# Patient Record
Sex: MALE | Race: WHITE | NOT HISPANIC OR LATINO | Employment: OTHER | URBAN - METROPOLITAN AREA
[De-identification: names, ages, dates, MRNs, and addresses within clinical notes are randomized per-mention and may not be internally consistent; named-entity substitution may affect disease eponyms.]

---

## 2017-02-01 ENCOUNTER — ANESTHESIA (OUTPATIENT)
Dept: SURGERY | Facility: CLINIC | Age: 62
DRG: 470 | End: 2017-02-01
Payer: COMMERCIAL

## 2017-02-01 ENCOUNTER — APPOINTMENT (OUTPATIENT)
Dept: GENERAL RADIOLOGY | Facility: CLINIC | Age: 62
DRG: 470 | End: 2017-02-01
Attending: ORTHOPAEDIC SURGERY
Payer: COMMERCIAL

## 2017-02-01 ENCOUNTER — ANESTHESIA EVENT (OUTPATIENT)
Dept: SURGERY | Facility: CLINIC | Age: 62
DRG: 470 | End: 2017-02-01
Payer: COMMERCIAL

## 2017-02-01 ENCOUNTER — HOSPITAL ENCOUNTER (INPATIENT)
Facility: CLINIC | Age: 62
LOS: 2 days | Discharge: HOME | DRG: 470 | End: 2017-02-03
Attending: ORTHOPAEDIC SURGERY | Admitting: ORTHOPAEDIC SURGERY
Payer: COMMERCIAL

## 2017-02-01 DIAGNOSIS — Z96.649 S/P REVISION OF TOTAL HIP: Primary | ICD-10-CM

## 2017-02-01 PROBLEM — G47.33 OSA (OBSTRUCTIVE SLEEP APNEA): Status: ACTIVE | Noted: 2017-02-01

## 2017-02-01 PROBLEM — I10 BENIGN ESSENTIAL HYPERTENSION: Status: ACTIVE | Noted: 2017-02-01

## 2017-02-01 PROBLEM — E66.01 MORBID OBESITY DUE TO EXCESS CALORIES (H): Status: ACTIVE | Noted: 2017-02-01

## 2017-02-01 LAB
ABO + RH BLD: NORMAL
ABO + RH BLD: NORMAL
BLD GP AB SCN SERPL QL: NORMAL
BLOOD BANK CMNT PATIENT-IMP: NORMAL
CREAT SERPL-MCNC: 1.02 MG/DL (ref 0.66–1.25)
GFR SERPL CREATININE-BSD FRML MDRD: 74 ML/MIN/1.7M2
PLATELET # BLD AUTO: 198 10E9/L (ref 150–450)
POTASSIUM SERPL-SCNC: 4.3 MMOL/L (ref 3.4–5.3)
SPECIMEN EXP DATE BLD: NORMAL

## 2017-02-01 PROCEDURE — 84132 ASSAY OF SERUM POTASSIUM: CPT | Performed by: ANESTHESIOLOGY

## 2017-02-01 PROCEDURE — 25000132 ZZH RX MED GY IP 250 OP 250 PS 637: Performed by: ORTHOPAEDIC SURGERY

## 2017-02-01 PROCEDURE — 0SR901A REPLACEMENT OF RIGHT HIP JOINT WITH METAL SYNTHETIC SUBSTITUTE, UNCEMENTED, OPEN APPROACH: ICD-10-PCS | Performed by: ORTHOPAEDIC SURGERY

## 2017-02-01 PROCEDURE — 25000125 ZZHC RX 250: Performed by: NURSE ANESTHETIST, CERTIFIED REGISTERED

## 2017-02-01 PROCEDURE — 25000128 H RX IP 250 OP 636: Performed by: ANESTHESIOLOGY

## 2017-02-01 PROCEDURE — 12000007 ZZH R&B INTERMEDIATE

## 2017-02-01 PROCEDURE — 25800025 ZZH RX 258: Performed by: ORTHOPAEDIC SURGERY

## 2017-02-01 PROCEDURE — 82565 ASSAY OF CREATININE: CPT | Performed by: ORTHOPAEDIC SURGERY

## 2017-02-01 PROCEDURE — 86900 BLOOD TYPING SEROLOGIC ABO: CPT | Performed by: ANESTHESIOLOGY

## 2017-02-01 PROCEDURE — 40000169 ZZH STATISTIC PRE-PROCEDURE ASSESSMENT I: Performed by: ORTHOPAEDIC SURGERY

## 2017-02-01 PROCEDURE — 25000566 ZZH SEVOFLURANE, EA 15 MIN: Performed by: ORTHOPAEDIC SURGERY

## 2017-02-01 PROCEDURE — 36415 COLL VENOUS BLD VENIPUNCTURE: CPT | Performed by: ORTHOPAEDIC SURGERY

## 2017-02-01 PROCEDURE — 71000012 ZZH RECOVERY PHASE 1 LEVEL 1 FIRST HR: Performed by: ORTHOPAEDIC SURGERY

## 2017-02-01 PROCEDURE — 71000013 ZZH RECOVERY PHASE 1 LEVEL 1 EA ADDTL HR: Performed by: ORTHOPAEDIC SURGERY

## 2017-02-01 PROCEDURE — 25000125 ZZHC RX 250: Performed by: ANESTHESIOLOGY

## 2017-02-01 PROCEDURE — 25000128 H RX IP 250 OP 636: Performed by: PHYSICIAN ASSISTANT

## 2017-02-01 PROCEDURE — 25000125 ZZHC RX 250: Performed by: ORTHOPAEDIC SURGERY

## 2017-02-01 PROCEDURE — 25000128 H RX IP 250 OP 636

## 2017-02-01 PROCEDURE — 99222 1ST HOSP IP/OBS MODERATE 55: CPT | Performed by: PHYSICIAN ASSISTANT

## 2017-02-01 PROCEDURE — 99207 ZZC MOONLIGHTING INDICATOR: CPT | Performed by: PHYSICIAN ASSISTANT

## 2017-02-01 PROCEDURE — 37000008 ZZH ANESTHESIA TECHNICAL FEE, 1ST 30 MIN: Performed by: ORTHOPAEDIC SURGERY

## 2017-02-01 PROCEDURE — 25000132 ZZH RX MED GY IP 250 OP 250 PS 637: Performed by: PHYSICIAN ASSISTANT

## 2017-02-01 PROCEDURE — 25800025 ZZH RX 258: Performed by: ANESTHESIOLOGY

## 2017-02-01 PROCEDURE — C1776 JOINT DEVICE (IMPLANTABLE): HCPCS | Performed by: ORTHOPAEDIC SURGERY

## 2017-02-01 PROCEDURE — 25000128 H RX IP 250 OP 636: Performed by: NURSE ANESTHETIST, CERTIFIED REGISTERED

## 2017-02-01 PROCEDURE — 25800025 ZZH RX 258: Performed by: NURSE ANESTHETIST, CERTIFIED REGISTERED

## 2017-02-01 PROCEDURE — 36000067 ZZH SURGERY LEVEL 5 1ST 30 MIN: Performed by: ORTHOPAEDIC SURGERY

## 2017-02-01 PROCEDURE — 40000940 XR PELVIS AND HIP RIGHT 1 VIEW

## 2017-02-01 PROCEDURE — 27210995 ZZH RX 272: Performed by: ORTHOPAEDIC SURGERY

## 2017-02-01 PROCEDURE — 36415 COLL VENOUS BLD VENIPUNCTURE: CPT | Performed by: ANESTHESIOLOGY

## 2017-02-01 PROCEDURE — 27210794 ZZH OR GENERAL SUPPLY STERILE: Performed by: ORTHOPAEDIC SURGERY

## 2017-02-01 PROCEDURE — 86850 RBC ANTIBODY SCREEN: CPT | Performed by: ANESTHESIOLOGY

## 2017-02-01 PROCEDURE — 37000009 ZZH ANESTHESIA TECHNICAL FEE, EACH ADDTL 15 MIN: Performed by: ORTHOPAEDIC SURGERY

## 2017-02-01 PROCEDURE — P9041 ALBUMIN (HUMAN),5%, 50ML: HCPCS | Performed by: NURSE ANESTHETIST, CERTIFIED REGISTERED

## 2017-02-01 PROCEDURE — 86901 BLOOD TYPING SEROLOGIC RH(D): CPT | Performed by: ANESTHESIOLOGY

## 2017-02-01 PROCEDURE — 85049 AUTOMATED PLATELET COUNT: CPT | Performed by: ORTHOPAEDIC SURGERY

## 2017-02-01 PROCEDURE — 25000128 H RX IP 250 OP 636: Performed by: ORTHOPAEDIC SURGERY

## 2017-02-01 PROCEDURE — 36000069 ZZH SURGERY LEVEL 5 EA 15 ADDTL MIN: Performed by: ORTHOPAEDIC SURGERY

## 2017-02-01 DEVICE — IMPLANTABLE DEVICE: Type: IMPLANTABLE DEVICE | Site: HIP | Status: FUNCTIONAL

## 2017-02-01 RX ORDER — CEFAZOLIN SODIUM 2 G/100ML
2 INJECTION, SOLUTION INTRAVENOUS EVERY 8 HOURS
Status: COMPLETED | OUTPATIENT
Start: 2017-02-01 | End: 2017-02-02

## 2017-02-01 RX ORDER — GLYCOPYRROLATE 0.2 MG/ML
INJECTION, SOLUTION INTRAMUSCULAR; INTRAVENOUS PRN
Status: DISCONTINUED | OUTPATIENT
Start: 2017-02-01 | End: 2017-02-01

## 2017-02-01 RX ORDER — METOCLOPRAMIDE 10 MG/1
10 TABLET ORAL EVERY 6 HOURS PRN
Status: DISCONTINUED | OUTPATIENT
Start: 2017-02-01 | End: 2017-02-03 | Stop reason: HOSPADM

## 2017-02-01 RX ORDER — MAGNESIUM HYDROXIDE 1200 MG/15ML
LIQUID ORAL PRN
Status: DISCONTINUED | OUTPATIENT
Start: 2017-02-01 | End: 2017-02-01 | Stop reason: HOSPADM

## 2017-02-01 RX ORDER — CEFAZOLIN SODIUM 1 G/3ML
1 INJECTION, POWDER, FOR SOLUTION INTRAMUSCULAR; INTRAVENOUS SEE ADMIN INSTRUCTIONS
Status: DISCONTINUED | OUTPATIENT
Start: 2017-02-01 | End: 2017-02-01 | Stop reason: HOSPADM

## 2017-02-01 RX ORDER — FENTANYL CITRATE 0.05 MG/ML
25-50 INJECTION, SOLUTION INTRAMUSCULAR; INTRAVENOUS
Status: DISCONTINUED | OUTPATIENT
Start: 2017-02-01 | End: 2017-02-01 | Stop reason: HOSPADM

## 2017-02-01 RX ORDER — HYDROMORPHONE HYDROCHLORIDE 1 MG/ML
.3-.5 INJECTION, SOLUTION INTRAMUSCULAR; INTRAVENOUS; SUBCUTANEOUS
Status: DISCONTINUED | OUTPATIENT
Start: 2017-02-01 | End: 2017-02-03 | Stop reason: HOSPADM

## 2017-02-01 RX ORDER — PROCHLORPERAZINE MALEATE 5 MG
5-10 TABLET ORAL EVERY 6 HOURS PRN
Status: DISCONTINUED | OUTPATIENT
Start: 2017-02-01 | End: 2017-02-03 | Stop reason: HOSPADM

## 2017-02-01 RX ORDER — EPHEDRINE SULFATE 50 MG/ML
INJECTION, SOLUTION INTRAMUSCULAR; INTRAVENOUS; SUBCUTANEOUS PRN
Status: DISCONTINUED | OUTPATIENT
Start: 2017-02-01 | End: 2017-02-01

## 2017-02-01 RX ORDER — LIDOCAINE 40 MG/G
CREAM TOPICAL
Status: DISCONTINUED | OUTPATIENT
Start: 2017-02-01 | End: 2017-02-03 | Stop reason: HOSPADM

## 2017-02-01 RX ORDER — NALOXONE HYDROCHLORIDE 0.4 MG/ML
.1-.4 INJECTION, SOLUTION INTRAMUSCULAR; INTRAVENOUS; SUBCUTANEOUS
Status: DISCONTINUED | OUTPATIENT
Start: 2017-02-01 | End: 2017-02-03 | Stop reason: HOSPADM

## 2017-02-01 RX ORDER — FENTANYL CITRATE 50 UG/ML
INJECTION, SOLUTION INTRAMUSCULAR; INTRAVENOUS PRN
Status: DISCONTINUED | OUTPATIENT
Start: 2017-02-01 | End: 2017-02-01

## 2017-02-01 RX ORDER — METOCLOPRAMIDE HYDROCHLORIDE 5 MG/ML
10 INJECTION INTRAMUSCULAR; INTRAVENOUS EVERY 6 HOURS PRN
Status: DISCONTINUED | OUTPATIENT
Start: 2017-02-01 | End: 2017-02-03 | Stop reason: HOSPADM

## 2017-02-01 RX ORDER — DEXTROSE MONOHYDRATE, SODIUM CHLORIDE, AND POTASSIUM CHLORIDE 50; 1.49; 4.5 G/1000ML; G/1000ML; G/1000ML
INJECTION, SOLUTION INTRAVENOUS CONTINUOUS
Status: DISCONTINUED | OUTPATIENT
Start: 2017-02-01 | End: 2017-02-03 | Stop reason: HOSPADM

## 2017-02-01 RX ORDER — ALBUTEROL SULFATE 0.83 MG/ML
2.5 SOLUTION RESPIRATORY (INHALATION) EVERY 4 HOURS PRN
Status: DISCONTINUED | OUTPATIENT
Start: 2017-02-01 | End: 2017-02-01 | Stop reason: HOSPADM

## 2017-02-01 RX ORDER — DIPHENHYDRAMINE HYDROCHLORIDE 50 MG/ML
INJECTION INTRAMUSCULAR; INTRAVENOUS PRN
Status: DISCONTINUED | OUTPATIENT
Start: 2017-02-01 | End: 2017-02-01

## 2017-02-01 RX ORDER — DEXAMETHASONE SODIUM PHOSPHATE 4 MG/ML
INJECTION, SOLUTION INTRA-ARTICULAR; INTRALESIONAL; INTRAMUSCULAR; INTRAVENOUS; SOFT TISSUE PRN
Status: DISCONTINUED | OUTPATIENT
Start: 2017-02-01 | End: 2017-02-01

## 2017-02-01 RX ORDER — PROPOFOL 10 MG/ML
INJECTION, EMULSION INTRAVENOUS PRN
Status: DISCONTINUED | OUTPATIENT
Start: 2017-02-01 | End: 2017-02-01

## 2017-02-01 RX ORDER — DIPHENHYDRAMINE HYDROCHLORIDE 50 MG/ML
25 INJECTION INTRAMUSCULAR; INTRAVENOUS EVERY 6 HOURS PRN
Status: DISCONTINUED | OUTPATIENT
Start: 2017-02-01 | End: 2017-02-03 | Stop reason: HOSPADM

## 2017-02-01 RX ORDER — ONDANSETRON 2 MG/ML
INJECTION INTRAMUSCULAR; INTRAVENOUS PRN
Status: DISCONTINUED | OUTPATIENT
Start: 2017-02-01 | End: 2017-02-01

## 2017-02-01 RX ORDER — ACETAMINOPHEN 325 MG/1
650 TABLET ORAL EVERY 4 HOURS PRN
Status: DISCONTINUED | OUTPATIENT
Start: 2017-02-04 | End: 2017-02-03 | Stop reason: HOSPADM

## 2017-02-01 RX ORDER — CYCLOBENZAPRINE HCL 10 MG
10 TABLET ORAL 3 TIMES DAILY PRN
Status: DISCONTINUED | OUTPATIENT
Start: 2017-02-01 | End: 2017-02-03 | Stop reason: HOSPADM

## 2017-02-01 RX ORDER — ONDANSETRON 4 MG/1
4 TABLET, ORALLY DISINTEGRATING ORAL EVERY 30 MIN PRN
Status: DISCONTINUED | OUTPATIENT
Start: 2017-02-01 | End: 2017-02-01 | Stop reason: HOSPADM

## 2017-02-01 RX ORDER — ONDANSETRON 2 MG/ML
4 INJECTION INTRAMUSCULAR; INTRAVENOUS EVERY 30 MIN PRN
Status: DISCONTINUED | OUTPATIENT
Start: 2017-02-01 | End: 2017-02-01 | Stop reason: HOSPADM

## 2017-02-01 RX ORDER — ONDANSETRON 2 MG/ML
4 INJECTION INTRAMUSCULAR; INTRAVENOUS EVERY 6 HOURS PRN
Status: DISCONTINUED | OUTPATIENT
Start: 2017-02-01 | End: 2017-02-03 | Stop reason: HOSPADM

## 2017-02-01 RX ORDER — ONDANSETRON 4 MG/1
4 TABLET, ORALLY DISINTEGRATING ORAL EVERY 6 HOURS PRN
Status: DISCONTINUED | OUTPATIENT
Start: 2017-02-01 | End: 2017-02-03 | Stop reason: HOSPADM

## 2017-02-01 RX ORDER — SODIUM CHLORIDE, SODIUM LACTATE, POTASSIUM CHLORIDE, CALCIUM CHLORIDE 600; 310; 30; 20 MG/100ML; MG/100ML; MG/100ML; MG/100ML
1000 INJECTION, SOLUTION INTRAVENOUS CONTINUOUS
Status: DISCONTINUED | OUTPATIENT
Start: 2017-02-01 | End: 2017-02-01 | Stop reason: HOSPADM

## 2017-02-01 RX ORDER — LIDOCAINE HYDROCHLORIDE 20 MG/ML
INJECTION, SOLUTION INFILTRATION; PERINEURAL PRN
Status: DISCONTINUED | OUTPATIENT
Start: 2017-02-01 | End: 2017-02-01

## 2017-02-01 RX ORDER — DIPHENHYDRAMINE HCL 25 MG
25 CAPSULE ORAL EVERY 6 HOURS PRN
Status: DISCONTINUED | OUTPATIENT
Start: 2017-02-01 | End: 2017-02-03 | Stop reason: HOSPADM

## 2017-02-01 RX ORDER — ALBUMIN, HUMAN INJ 5% 5 %
SOLUTION INTRAVENOUS CONTINUOUS PRN
Status: DISCONTINUED | OUTPATIENT
Start: 2017-02-01 | End: 2017-02-01

## 2017-02-01 RX ORDER — SODIUM CHLORIDE, SODIUM LACTATE, POTASSIUM CHLORIDE, CALCIUM CHLORIDE 600; 310; 30; 20 MG/100ML; MG/100ML; MG/100ML; MG/100ML
INJECTION, SOLUTION INTRAVENOUS CONTINUOUS
Status: DISCONTINUED | OUTPATIENT
Start: 2017-02-01 | End: 2017-02-01 | Stop reason: HOSPADM

## 2017-02-01 RX ORDER — VECURONIUM BROMIDE 1 MG/ML
INJECTION, POWDER, LYOPHILIZED, FOR SOLUTION INTRAVENOUS PRN
Status: DISCONTINUED | OUTPATIENT
Start: 2017-02-01 | End: 2017-02-01

## 2017-02-01 RX ORDER — ACETAMINOPHEN 500 MG
1000 TABLET ORAL ONCE
Status: COMPLETED | OUTPATIENT
Start: 2017-02-01 | End: 2017-02-01

## 2017-02-01 RX ORDER — NEOSTIGMINE METHYLSULFATE 1 MG/ML
VIAL (ML) INJECTION PRN
Status: DISCONTINUED | OUTPATIENT
Start: 2017-02-01 | End: 2017-02-01

## 2017-02-01 RX ORDER — CEFAZOLIN SODIUM 1 G/50ML
3 SOLUTION INTRAVENOUS
Status: COMPLETED | OUTPATIENT
Start: 2017-02-01 | End: 2017-02-01

## 2017-02-01 RX ORDER — HYDROMORPHONE HYDROCHLORIDE 1 MG/ML
INJECTION, SOLUTION INTRAMUSCULAR; INTRAVENOUS; SUBCUTANEOUS
Status: COMPLETED
Start: 2017-02-01 | End: 2017-02-01

## 2017-02-01 RX ORDER — AMOXICILLIN 250 MG
1-2 CAPSULE ORAL 2 TIMES DAILY
Status: DISCONTINUED | OUTPATIENT
Start: 2017-02-01 | End: 2017-02-03 | Stop reason: HOSPADM

## 2017-02-01 RX ORDER — ACETAMINOPHEN 325 MG/1
975 TABLET ORAL EVERY 8 HOURS
Status: DISCONTINUED | OUTPATIENT
Start: 2017-02-01 | End: 2017-02-03 | Stop reason: HOSPADM

## 2017-02-01 RX ORDER — OXYCODONE HYDROCHLORIDE 5 MG/1
5-10 TABLET ORAL
Status: DISCONTINUED | OUTPATIENT
Start: 2017-02-01 | End: 2017-02-03 | Stop reason: HOSPADM

## 2017-02-01 RX ORDER — HYDRALAZINE HYDROCHLORIDE 20 MG/ML
10 INJECTION INTRAMUSCULAR; INTRAVENOUS EVERY 4 HOURS PRN
Status: DISCONTINUED | OUTPATIENT
Start: 2017-02-01 | End: 2017-02-03 | Stop reason: HOSPADM

## 2017-02-01 RX ORDER — MEPERIDINE HYDROCHLORIDE 25 MG/ML
12.5 INJECTION INTRAMUSCULAR; INTRAVENOUS; SUBCUTANEOUS EVERY 5 MIN PRN
Status: DISCONTINUED | OUTPATIENT
Start: 2017-02-01 | End: 2017-02-01 | Stop reason: HOSPADM

## 2017-02-01 RX ORDER — SODIUM CHLORIDE, SODIUM LACTATE, POTASSIUM CHLORIDE, CALCIUM CHLORIDE 600; 310; 30; 20 MG/100ML; MG/100ML; MG/100ML; MG/100ML
INJECTION, SOLUTION INTRAVENOUS CONTINUOUS PRN
Status: DISCONTINUED | OUTPATIENT
Start: 2017-02-01 | End: 2017-02-01

## 2017-02-01 RX ORDER — AMLODIPINE BESYLATE 5 MG/1
5 TABLET ORAL DAILY
Status: DISCONTINUED | OUTPATIENT
Start: 2017-02-02 | End: 2017-02-03 | Stop reason: HOSPADM

## 2017-02-01 RX ADMIN — LIDOCAINE HYDROCHLORIDE 80 MG: 20 INJECTION, SOLUTION INFILTRATION; PERINEURAL at 13:10

## 2017-02-01 RX ADMIN — PHENYLEPHRINE HYDROCHLORIDE 200 MCG: 10 INJECTION, SOLUTION INTRAMUSCULAR; INTRAVENOUS; SUBCUTANEOUS at 14:05

## 2017-02-01 RX ADMIN — HYDROMORPHONE HYDROCHLORIDE 0.5 MG: 1 INJECTION, SOLUTION INTRAMUSCULAR; INTRAVENOUS; SUBCUTANEOUS at 17:46

## 2017-02-01 RX ADMIN — PHENYLEPHRINE HYDROCHLORIDE 200 MCG: 10 INJECTION, SOLUTION INTRAMUSCULAR; INTRAVENOUS; SUBCUTANEOUS at 13:50

## 2017-02-01 RX ADMIN — ONDANSETRON 4 MG: 2 INJECTION INTRAMUSCULAR; INTRAVENOUS at 15:05

## 2017-02-01 RX ADMIN — SODIUM CHLORIDE, POTASSIUM CHLORIDE, SODIUM LACTATE AND CALCIUM CHLORIDE: 600; 310; 30; 20 INJECTION, SOLUTION INTRAVENOUS at 13:20

## 2017-02-01 RX ADMIN — VECURONIUM BROMIDE 3 MG: 1 INJECTION, POWDER, LYOPHILIZED, FOR SOLUTION INTRAVENOUS at 13:49

## 2017-02-01 RX ADMIN — Medication 15 MG: at 13:47

## 2017-02-01 RX ADMIN — ONDANSETRON HYDROCHLORIDE 4 MG: 2 SOLUTION INTRAMUSCULAR; INTRAVENOUS at 16:25

## 2017-02-01 RX ADMIN — FENTANYL CITRATE 50 MCG: 50 INJECTION, SOLUTION INTRAMUSCULAR; INTRAVENOUS at 15:58

## 2017-02-01 RX ADMIN — PHENYLEPHRINE HYDROCHLORIDE 200 MCG: 10 INJECTION, SOLUTION INTRAMUSCULAR; INTRAVENOUS; SUBCUTANEOUS at 14:16

## 2017-02-01 RX ADMIN — PHENYLEPHRINE HYDROCHLORIDE 100 MCG: 10 INJECTION, SOLUTION INTRAMUSCULAR; INTRAVENOUS; SUBCUTANEOUS at 13:31

## 2017-02-01 RX ADMIN — HYDROMORPHONE HYDROCHLORIDE 0.5 MG: 1 INJECTION, SOLUTION INTRAMUSCULAR; INTRAVENOUS; SUBCUTANEOUS at 15:34

## 2017-02-01 RX ADMIN — SODIUM CHLORIDE, POTASSIUM CHLORIDE, SODIUM LACTATE AND CALCIUM CHLORIDE: 600; 310; 30; 20 INJECTION, SOLUTION INTRAVENOUS at 14:06

## 2017-02-01 RX ADMIN — GLYCOPYRROLATE 0.6 MG: 0.2 INJECTION, SOLUTION INTRAMUSCULAR; INTRAVENOUS at 14:58

## 2017-02-01 RX ADMIN — HYDROMORPHONE HYDROCHLORIDE 0.5 MG: 1 INJECTION, SOLUTION INTRAMUSCULAR; INTRAVENOUS; SUBCUTANEOUS at 16:04

## 2017-02-01 RX ADMIN — GLYCOPYRROLATE 0.3 MG: 0.2 INJECTION, SOLUTION INTRAMUSCULAR; INTRAVENOUS at 13:49

## 2017-02-01 RX ADMIN — FENTANYL CITRATE 250 MCG: 50 INJECTION, SOLUTION INTRAMUSCULAR; INTRAVENOUS at 13:43

## 2017-02-01 RX ADMIN — MIDAZOLAM HYDROCHLORIDE 2 MG: 1 INJECTION, SOLUTION INTRAMUSCULAR; INTRAVENOUS at 13:07

## 2017-02-01 RX ADMIN — PHENYLEPHRINE HYDROCHLORIDE 100 MCG: 10 INJECTION, SOLUTION INTRAMUSCULAR; INTRAVENOUS; SUBCUTANEOUS at 13:29

## 2017-02-01 RX ADMIN — SODIUM CHLORIDE, POTASSIUM CHLORIDE, SODIUM LACTATE AND CALCIUM CHLORIDE 1000 ML: 600; 310; 30; 20 INJECTION, SOLUTION INTRAVENOUS at 16:05

## 2017-02-01 RX ADMIN — FENTANYL CITRATE 100 MCG: 50 INJECTION, SOLUTION INTRAMUSCULAR; INTRAVENOUS at 13:10

## 2017-02-01 RX ADMIN — DIPHENHYDRAMINE HYDROCHLORIDE 12.5 MG: 50 INJECTION, SOLUTION INTRAMUSCULAR; INTRAVENOUS at 14:42

## 2017-02-01 RX ADMIN — HYDROMORPHONE HYDROCHLORIDE 0.5 MG: 1 INJECTION, SOLUTION INTRAMUSCULAR; INTRAVENOUS; SUBCUTANEOUS at 19:56

## 2017-02-01 RX ADMIN — FENTANYL CITRATE 150 MCG: 50 INJECTION, SOLUTION INTRAMUSCULAR; INTRAVENOUS at 13:42

## 2017-02-01 RX ADMIN — NEOSTIGMINE METHYLSULFATE 4 MG: 1 INJECTION INTRAMUSCULAR; INTRAVENOUS; SUBCUTANEOUS at 14:58

## 2017-02-01 RX ADMIN — SODIUM CHLORIDE, POTASSIUM CHLORIDE, SODIUM LACTATE AND CALCIUM CHLORIDE: 600; 310; 30; 20 INJECTION, SOLUTION INTRAVENOUS at 12:23

## 2017-02-01 RX ADMIN — PHENYLEPHRINE HYDROCHLORIDE 200 MCG: 10 INJECTION, SOLUTION INTRAMUSCULAR; INTRAVENOUS; SUBCUTANEOUS at 13:47

## 2017-02-01 RX ADMIN — ROCURONIUM BROMIDE 50 MG: 10 INJECTION INTRAVENOUS at 13:11

## 2017-02-01 RX ADMIN — HYDROMORPHONE HYDROCHLORIDE 0.5 MG: 1 INJECTION, SOLUTION INTRAMUSCULAR; INTRAVENOUS; SUBCUTANEOUS at 15:42

## 2017-02-01 RX ADMIN — CEFAZOLIN SODIUM 2 G: 2 INJECTION, SOLUTION INTRAVENOUS at 21:59

## 2017-02-01 RX ADMIN — ALBUMIN (HUMAN): 12.5 SOLUTION INTRAVENOUS at 13:40

## 2017-02-01 RX ADMIN — PHENYLEPHRINE HYDROCHLORIDE 200 MCG: 10 INJECTION, SOLUTION INTRAMUSCULAR; INTRAVENOUS; SUBCUTANEOUS at 14:29

## 2017-02-01 RX ADMIN — SODIUM CHLORIDE, POTASSIUM CHLORIDE, SODIUM LACTATE AND CALCIUM CHLORIDE: 600; 310; 30; 20 INJECTION, SOLUTION INTRAVENOUS at 10:55

## 2017-02-01 RX ADMIN — DEXAMETHASONE SODIUM PHOSPHATE 4 MG: 4 INJECTION, SOLUTION INTRAMUSCULAR; INTRAVENOUS at 13:28

## 2017-02-01 RX ADMIN — POTASSIUM CHLORIDE, DEXTROSE MONOHYDRATE AND SODIUM CHLORIDE: 150; 5; 450 INJECTION, SOLUTION INTRAVENOUS at 17:45

## 2017-02-01 RX ADMIN — FENTANYL CITRATE 50 MCG: 50 INJECTION, SOLUTION INTRAMUSCULAR; INTRAVENOUS at 15:51

## 2017-02-01 RX ADMIN — PROPOFOL 200 MG: 10 INJECTION, EMULSION INTRAVENOUS at 13:10

## 2017-02-01 RX ADMIN — ACETAMINOPHEN 975 MG: 325 TABLET, FILM COATED ORAL at 21:59

## 2017-02-01 RX ADMIN — PHENYLEPHRINE HYDROCHLORIDE 200 MCG: 10 INJECTION, SOLUTION INTRAMUSCULAR; INTRAVENOUS; SUBCUTANEOUS at 13:58

## 2017-02-01 RX ADMIN — Medication 10 MG: at 13:35

## 2017-02-01 RX ADMIN — HYDROMORPHONE HYDROCHLORIDE 1 MG: 1 INJECTION, SOLUTION INTRAMUSCULAR; INTRAVENOUS; SUBCUTANEOUS at 16:16

## 2017-02-01 RX ADMIN — PHENYLEPHRINE HYDROCHLORIDE 200 MCG: 10 INJECTION, SOLUTION INTRAMUSCULAR; INTRAVENOUS; SUBCUTANEOUS at 14:41

## 2017-02-01 RX ADMIN — Medication 3 G: at 13:17

## 2017-02-01 RX ADMIN — ACETAMINOPHEN 1000 MG: 500 TABLET ORAL at 10:54

## 2017-02-01 RX ADMIN — PHENYLEPHRINE HYDROCHLORIDE 100 MCG: 10 INJECTION, SOLUTION INTRAMUSCULAR; INTRAVENOUS; SUBCUTANEOUS at 13:26

## 2017-02-01 RX ADMIN — PHENYLEPHRINE HYDROCHLORIDE 200 MCG: 10 INJECTION, SOLUTION INTRAMUSCULAR; INTRAVENOUS; SUBCUTANEOUS at 13:34

## 2017-02-01 RX ADMIN — PHENYLEPHRINE HYDROCHLORIDE 100 MCG: 10 INJECTION, SOLUTION INTRAMUSCULAR; INTRAVENOUS; SUBCUTANEOUS at 13:23

## 2017-02-01 ASSESSMENT — LIFESTYLE VARIABLES: TOBACCO_USE: 0

## 2017-02-01 ASSESSMENT — ENCOUNTER SYMPTOMS: DYSRHYTHMIAS: 0

## 2017-02-01 NOTE — IP AVS SNAPSHOT
MRN:[id number]                      After Visit Summary   2/1/2017    Paras Ramos    MRN: [id number]           Thank you!     Thank you for choosing [facility] for your care. Our goal is always to provide you with excellent care. Hearing back from our patients is one way we can continue to improve our services. Please take a few minutes to complete the written survey that you may receive in the mail after you visit with us. Thank you!        Patient Information     Date Of Birth          1955        About your hospital stay     You were admitted on:  February 1, 2017 You last received care in the:  [place] [address] [facility]    You were discharged on:  February 3, 2017       Who to Call     For medical emergencies, please call 911.  For non-urgent questions about your medical care, please call your primary care provider or clinic, [phone]  For questions related to your surgery, please call your surgery clinic        Attending Provider     Provider    Cirilo Khan MD       Primary Care Provider Office Phone # Fax #    [facility] [phone]       [address]  [facility] [address]        Follow-up Appointments     Follow-up and recommended labs and tests        Follow-up with your sleep clinic as soon as possible to discuss alternative options since you are unable to tolerate CPAP.                  Further instructions from your care team       DISCHARGE INSTRUCTIONS FOR YOUR TOTAL HIP REPLACEMENT     CIRILO KHAN MD    Wound Care:    Change your dressing daily. Inspect your incision at the time of dressing change for increased redness, swelling, and drainage.  Some discoloration and bruising is usually seen, but call my office if you are concerned or if you see changes in the wound appearance.  Also, call if you develop a fever above 101 degrees.     You may shower directly over the wound beginning 4 days after your surgery,  but do not submerge wound under water until the sutures are removed and the wound is completely sealed and without any drainage. Keep a dressing over the wound until after your post operative clinic visit when the sutures are removed.      Activities and outpatient Physical Therapy:  Physical activity may be resumed gradually according to your comfort level and the restrictions on your hip positioning as instructed in the pre-op class and by therapy. Do not cross your legs and do not flex your hip up past 90 degrees. You may bear weight as tolerated on the operated leg.  Use crutches or the walker as instructed by Physical Therapy.  Follow your home exercise program as instructed by your therapist.     Wear your anti-embolism stockings day and night until seen for your post operative appointment.  Keep them flat on your skin.  Do not allow them to roll up or crease your skin.  Remove twice daily for one hour at a time.  You may hand wash and air dry your stockings.  Notify my office if you experience new pain behind your calf or thigh.  Pump your ankles frequently.        Outpatient Physical Therapy visits are required following discharge from the hospital.  The referral for these outpatient therapy visits is routinely given to you at the time of your surgical scheduling. You should have already scheduled your therapy sessions in advance.  If you have not done so, please immediately contact the therapy location of your choice to schedule the appointments for the physical therapy regimen that has been prescribed for you. You may discontinue the crutches or walker per the therapist's recommendation.      Medications:  New medications for you on discharge include a pain medication, a stool softener, and a blood thinner.  Detailed instructions come with those medications.  You will also receive instructions on when to resume your home medications.     Antibiotic coverage will be needed before any type of dental  "procedure for at least the next two years due to the risk of infection.  After that, antibiotic coverage is optional. You should notify your dentist of your total hip surgery and call your dentist or our office one week before a dental appointment for antibiotics.         Post operative clinic appointment:  Your post operative clinic visit has been prearranged.   Call [phone] with any questions.    Cirilo Nassar MD        Pending Results     No orders found from 2017 to 2017.            Statement of Approval     Ordered          17 1111  I have reviewed and agree with all the recommendations and orders detailed in this document.   EFFECTIVE NOW     Approved and electronically signed by:  Cirilo Dumont PA-C             Admission Information        Provider Department Dept Phone    2017 Cirilo Nassar MD [address] [facility] [phone]      Your Vitals Were     Blood Pressure Pulse Temperature    101/63 mmHg 92 98.9  F (37.2  C) (Oral)    Respirations Height Weight    16 1.88 m (6' 2\") 167.831 kg (370 lb)    BMI (Body Mass Index) Pulse Oximetry       47.49 kg/m2 95%       MyChart Information     [employer] lets you send messages to your doctor, view your test results, renew your prescriptions, schedule appointments and more. To sign up, go to [email or web address] . Click on \"Log in\" on the left side of the screen, which will take you to the Welcome page. Then click on \"Sign up Now\" on the right side of the page.     You will be asked to enter the access code listed below, as well as some personal information. Please follow the directions to create your username and password.     Your access code is: [email or web address]  Expires: 3/6/2017  1:31 PM     Your access code will  in 90 days. If you need help or a new code, please call your [facility] clinic or [phone].        Care EveryWhere ID     This is your Care EveryWhere ID. This could be used by other organizations to access your " [facility] medical records  [id number]           Review of your medicines      START taking        Dose / Directions    enoxaparin 40 MG/0.4ML injection   Commonly known as:  LOVENOX   Used for:  S/P revision of total hip   Notes to Patient:  2/3 8 pm         Dose:  40 mg   Inject 0.4 mLs (40 mg) Subcutaneous every 12 hours for 12 days   Quantity:  9.6 mL   Refills:  0       oxyCODONE 5 MG IR tablet   Commonly known as:  ROXICODONE   Used for:  S/P revision of total hip        Dose:  5-10 mg   Take 1-2 tablets (5-10 mg) by mouth every 3 hours as needed for moderate to severe pain   Quantity:  75 tablet   Refills:  0       senna-docusate 8.6-50 MG per tablet   Commonly known as:  SENOKOT-S;PERICOLACE   Used for:  S/P revision of total hip        Dose:  1-2 tablet   Take 1-2 tablets by mouth 2 times daily   Quantity:  30 tablet   Refills:  1         CONTINUE these medicines which have NOT CHANGED        Dose / Directions    COZAAR PO        Dose:  100 mg   Take 100 mg by mouth daily   Refills:  0       HYDROCHLOROTHIAZIDE PO        Dose:  25 mg   Take 25 mg by mouth daily   Refills:  0       NORVASC PO        Dose:  5 mg   Take 5 mg by mouth daily   Refills:  0         STOP taking     ASPIRIN EC PO           NAPROXEN PO                Where to get your medicines      These medications were sent to [facility] [place], [place] - [address]  [address], [place] [address]     Phone:  [phone]    - enoxaparin 40 MG/0.4ML injection  - senna-docusate 8.6-50 MG per tablet      Some of these will need a paper prescription and others can be bought over the counter. Ask your nurse if you have questions.     Bring a paper prescription for each of these medications    - oxyCODONE 5 MG IR tablet             Protect others around you: Learn how to safely use, store and throw away your medicines at www.disposemymeds.org.             Medication List: This is a list of all your medications and when  to take them. Check marks below indicate your daily home schedule. Keep this list as a reference.      Medications           Morning Afternoon Evening Bedtime As Needed    COZAAR PO   Take 100 mg by mouth daily   Last time this was given:  100 mg on 2/3/2017  8:36 AM   Next Dose Due:  2/4                                   enoxaparin 40 MG/0.4ML injection   Commonly known as:  LOVENOX   Inject 0.4 mLs (40 mg) Subcutaneous every 12 hours for 12 days   Last time this was given:  40 mg on 2/3/2017  8:36 AM   Notes to Patient:  2/3 8 pm                                       HYDROCHLOROTHIAZIDE PO   Take 25 mg by mouth daily   Next Dose Due:  Resume                                  NORVASC PO   Take 5 mg by mouth daily   Last time this was given:  5 mg on 2/3/2017  8:36 AM   Next Dose Due:  2/4                                   oxyCODONE 5 MG IR tablet   Commonly known as:  ROXICODONE   Take 1-2 tablets (5-10 mg) by mouth every 3 hours as needed for moderate to severe pain   Last time this was given:  5 mg on 2/3/2017  8:36 AM   Next Dose Due:  Anytime as needed                                senna-docusate 8.6-50 MG per tablet   Commonly known as:  SENOKOT-S;PERICOLACE   Take 1-2 tablets by mouth 2 times daily   Last time this was given:  1 tablet on 2/3/2017  8:37 AM   Next Dose Due:  2/3

## 2017-02-01 NOTE — IP AVS SNAPSHOT
[address] [facility]    [address] [place] [address]    Phone:  [phone]                                       After Visit Summary   2/1/2017    Paras Ramos    MRN: [id number]           After Visit Summary Signature Page     I have received my discharge instructions, and my questions have been answered. I have discussed any challenges I see with this plan with the nurse or doctor.    ..........................................................................................................................................  Patient/Patient Representative Signature      ..........................................................................................................................................  Patient Representative Print Name and Relationship to Patient    ..................................................               ................................................  Date                                            Time    ..........................................................................................................................................  Reviewed by Signature/Title    ...................................................              ..............................................  Date                                                            Time

## 2017-02-01 NOTE — PROGRESS NOTES
Telephone report given to [address] RN.  Patient will be transported to [address] via bariatric bed accompanied by ARACELI

## 2017-02-01 NOTE — CONSULTS
[facility]    Hospitalist Consultation    Date of Admission:  2/1/2017  Date of Consult (When I saw the patient): 02/01/2017    Assessment and Plan  Paras Ramos is a 61 year old male with a past medical history of HTN, GERD, RAUL not on CPAP, obesity, and osteoarthritis  who underwent an elective, uncomplicated revision of a right total hip arthroplasty.    Summary:    -POD# 0 Right total hip arthroplasty revision,  Due to recurrent hip dislocations   -Pain and anticoagulation management per orthopedics   -Incentive spirometry   -PT/OT   -ADAT, DC IV fluids once tolerating oral intake    -Hypertension   -antihypertensives restarted on 1/24 per preoperative H &P as he was not taking them prior to this date   -Will plan to resume amlodipine 5 mg in am, pending BMP and BP will resume other PTA meds thereafter   -PTA on Amlodipine 5 mg, HCTZ 25 mg and Losartan 100 mg   -will order prn hydralazine for SBP greater than 180 in the event of significant hypertension    -Obstructive Sleep Apnea, unable to tolerate CPAP   -follow   -has notable desaturations at night and suspect these will continue, continue supplemental oxygen via NC, he does not wear oxygen at home   -has not yet attempted treatment with a mandibular advancement device    -Morbid Obesity, BMI 48   -Diet and Exercise modifications    DVT Prophylaxis: Defer to primary service  Code Status: Full Code    Disposition: Per primary team    Pam Gutierres PA-C  Pager [phone]     This patient was discussed with Dr. Barker of the Hospitalist Service who agrees with current plans as outlined above.     Reason for Consult  Reason for consult: I was asked by Cesario to evaluate this patient for post operative medical management.    Primary Care Physician  [facility]    Chief Complaint  HTN    History is obtained from the patient,discussion with the nurse, and chart review    History of Present Illness  Paras Ramos is a 61  year old male with a past medical history of HTN, GERD, RAUL not on CPAP, obesity, recurrent hip dislocations, and osteoarthritis  who underwent an elective, uncomplicated revision of a right total hip arthroplasty.  There was an estimated 250 milliliters of blood loss.  Post operatively he had a brief episode of asymptomatic hypotension (BP 88/58).  This has since resolved.     While sleeping he continues to have ongoing desaturations due to his untreated sleep apnea. He does not wear supplemental oxygen at home.    Post operatively his only complaint is mild pain and mild cramps in the right leg.  He denies any chest pain/pressure, shortness of breath, nausea, vomiting or dizziness.      Past Medical History   I have reviewed this patient's medical history and updated it with pertinent information if needed.   Past Medical History   Diagnosis Date     Hypertension      Gastro-oesophageal reflux disease      Sleep apnea      no cpap     Arthritis      OA of ankle and hip     Hip dislocation, right (H)      ED (erectile dysfunction)      Colon polyp      Testicular hypofunction      OA (osteoarthritis) of ankle      Osteoarthritis of hip        Past Surgical History  I have reviewed this patient's surgical history and updated it with pertinent information if needed.  Past Surgical History   Procedure Laterality Date     Cholecystectomy       Colonoscopy       Arthroplasty ankle  12/9/2013     Procedure: ARTHROPLASTY ANKLE;  RIGHT TOTAL ANKLE ARTHROPLASTY, SUBTALAR FUSION, LIGAMENT REPAIR (Tornier VALENCIA)^;  Surgeon: Venkata Mccallum MD;  Location: [facility]     Repair ligament ankle  12/9/2013     Procedure: REPAIR LIGAMENT ANKLE;;  Surgeon: Venkata Mccallum MD;  Location: [facility]     Arthrodesis ankle  12/9/2013     Procedure: ARTHRODESIS ANKLE;;  Surgeon: Venkata Mccallum MD;  Location: [facility]     Arthroplasty hip  7/23/2014     Procedure: ARTHROPLASTY HIP;  Surgeon: Cirilo Nassar MD;  Location: [facility]      Arthroplasty revision hip Right 11/14/2014     Procedure: ARTHROPLASTY REVISION HIP;  Surgeon: Cirilo Nassar MD;  Location:  OR     Arthroplasty revision hip Right 11/9/2015     Procedure: ARTHROPLASTY REVISION HIP;  Surgeon: José Estrella MD;  Location:  OR       Prior to Admission Medications  Prior to Admission Medications   Prescriptions Last Dose Informant Patient Reported? Taking?   ASPIRIN EC PO 1/25/2017 Self Yes Yes   Sig: Take 81 mg by mouth daily   AmLODIPine Besylate (NORVASC PO) 2/1/2017 at 0700 Self Yes Yes   Sig: Take 5 mg by mouth daily   HYDROCHLOROTHIAZIDE PO 2/1/2017 at 0700 Self Yes Yes   Sig: Take 25 mg by mouth daily   Losartan Potassium (COZAAR PO) 2/1/2017 at 0700 Self Yes Yes   Sig: Take 100 mg by mouth daily    NAPROXEN PO 1/25/2017 at prn Self Yes Yes   Sig: Take 220-440 mg by mouth daily as needed for moderate pain      Facility-Administered Medications: None     Allergies  No Known Allergies    Social History  I have reviewed this patient's social history and updated it with pertinent information if needed. Paras Ramos  reports that he has never smoked. He does not have any smokeless tobacco history on file. He reports that he drinks alcohol. He reports that he does not use illicit drugs.    Family History  I have reviewed this patient's family history and updated it with pertinent information if needed.   History reviewed. No pertinent family history.    Review of Systems  The 10 point Review of Systems is negative other than noted in the HPI or here.     Physical Exam  Temp: 97.8  F (36.6  C) Temp src: Oral BP: 111/73 mmHg   Heart Rate: 85 Resp: 14 SpO2: 96 % O2 Device: Nasal cannula Oxygen Delivery: 2 LPM  Vital Signs with Ranges  Temp:  [97.1  F (36.2  C)-97.8  F (36.6  C)] 97.8  F (36.6  C)  Heart Rate:  [64-93] 85  Resp:  [10-16] 14  BP: ()/(57-90) 111/73 mmHg  SpO2:  [94 %-97 %] 96 %  370 lbs 0 oz    Constitutional: Alert and oriented x3, no acute  distress  Eyes: PERRL, EOMs intact  HEENT: patent nares, supple neck  Respiratory: clear to auscultation anteriorly  Cardiovascular: distant, regular rate and rhythm, no murmurs  GI: morbidly obese, soft, non tender, non distended, bowel sounds present  Lymph/Hematologic: no evidence of jaundice or bruising  Genitourinary: deferred, no nguyen catheter in place  Skin: warm and dry, no rashes  Musculoskeletal: able to move all extremities, neurovascularly intact in the bilateral lower extremities, trace edema, right hip wound site not examined  Neurologic: no focal neurologic deficits, gait not examined  Psychiatric: affect normal, answers questions appropriately    Data  -Data reviewed today: All pertinent laboratory and imaging results from this encounter were reviewed. I personally reviewed no images or EKG's today.    Recent Labs  Lab 02/01/17  1038   POTASSIUM 4.3       Imaging:  Recent Results (from the past 24 hour(s))   XR Pelvis w Hip Right 1 View    Narrative    XR PELVIS AND HIP RIGHT 1 VIEW   2/1/2017 3:46 PM     HISTORY: Post Op Hip Arthroplasty    COMPARISON: None.      Impression    IMPRESSION: Right total hip arthroplasty in place. This appears  somewhat change from the prior study and may be the result of new  components or at least a new acetabular component. No evidence of  complication.    KING NIEVES MD

## 2017-02-01 NOTE — ANESTHESIA POSTPROCEDURE EVALUATION
Patient: Paras Ramos    Procedure(s):  RIGHT TOTAL HIP ARTHROPLASTY REVISION  - Wound Class: I-Clean    Diagnosis:hardware failure right hip   Diagnosis Additional Information: No value filed.    Anesthesia Type:  General, ETT    Note:  Anesthesia Post Evaluation    Patient location during evaluation: PACU  Patient participation: Able to fully participate in evaluation  Level of consciousness: awake  Pain management: adequate  Airway patency: patent  Cardiovascular status: acceptable  Respiratory status: acceptable  Hydration status: acceptable  PONV: none     Anesthetic complications: None          Last vitals:  Filed Vitals:    02/01/17 1530 02/01/17 1540 02/01/17 1550   BP: 106/65 96/57 113/70   Temp:      Resp: 10 13 16   SpO2: 95%  95%         Electronically Signed By: Darling Atkinson  February 1, 2017  3:53 PM

## 2017-02-01 NOTE — PROGRESS NOTES
Pt requests bariatric bed/bed extender. Bariatric equipment ordered, Charge GABBY barnhart -- she will make phone calls.

## 2017-02-01 NOTE — BRIEF OP NOTE
[facility] Brief Operative Note    Pre-operative diagnosis: Right hip unstable prosthesis, failed SAPPHIRE   Post-operative diagnosis Right hip unstable prosthesis, failed SAPPHIRE   Procedure: Procedure(s):  RIGHT TOTAL HIP ARTHROPLASTY REVISION  - Wound Class: I-Clean   Surgeon(s): Surgeon(s) and Role:     * Cirilo Nassar MD - Primary     * Cirilo Dumont PA-C - Assisting   Estimated blood loss: 250 mL    Specimens: None   Findings: See op note

## 2017-02-01 NOTE — ANESTHESIA CARE TRANSFER NOTE
Patient: Paras Ramos    Procedure(s):  RIGHT TOTAL HIP ARTHROPLASTY REVISION  - Wound Class: I-Clean    Diagnosis: hardware failure right hip   Diagnosis Additional Information: No value filed.    Anesthesia Type:   General, ETT     Note:  Airway :Face Mask  Patient transferred to:PACU  Comments: Transferred to PACU, spontaneous respirations, 10L oxygen via facemask.  All monitors and alarms on and functioning, VSS.  Patient awake, comfortable.  Report to PACU RN.      Vitals: (Last set prior to Anesthesia Care Transfer)    CRNA VITALS  2/1/2017 1446 - 2/1/2017 1516      2/1/2017             Resp Rate (set): 10                Electronically Signed By: BRODY Rodney CRNA  February 1, 2017  3:16 PM

## 2017-02-01 NOTE — ANESTHESIA PREPROCEDURE EVALUATION
Procedure: Procedure(s):  ARTHROPLASTY REVISION HIP  Preop diagnosis: hardware failure right hip     No Known Allergies  Past Medical History   Diagnosis Date     Hypertension      Gastro-oesophageal reflux disease      Sleep apnea      no cpap     Arthritis      OA of ankle and hip     Hip dislocation, right (H)      ED (erectile dysfunction)      Colon polyp      Testicular hypofunction      OA (osteoarthritis) of ankle      Osteoarthritis of hip      Past Surgical History   Procedure Laterality Date     Cholecystectomy       Colonoscopy       Arthroplasty ankle  12/9/2013     Procedure: ARTHROPLASTY ANKLE;  RIGHT TOTAL ANKLE ARTHROPLASTY, SUBTALAR FUSION, LIGAMENT REPAIR ([place] [facility])^;  Surgeon: Venkata Mccallum MD;  Location: [facility]     Repair ligament ankle  12/9/2013     Procedure: REPAIR LIGAMENT ANKLE;;  Surgeon: Venkata Mccallum MD;  [facility]     Arthrodesis ankle  12/9/2013     Procedure: ARTHRODESIS ANKLE;;  Surgeon: Venkata Mccallum MD;  [facility]     Arthroplasty hip  7/23/2014     Procedure: ARTHROPLASTY HIP;  Surgeon: Cirilo Nassar MD;  [facility]:  [facility]     Arthroplasty revision hip Right 11/14/2014     Procedure: ARTHROPLASTY REVISION HIP;  Surgeon: Cirilo Nassar MD;  Location:  [facility]     Arthroplasty revision hip Right 11/9/2015     Procedure: ARTHROPLASTY REVISION HIP;  Surgeon: José Estrella MD;  Location:  OR     Prior to Admission medications    Medication Sig Start Date End Date Taking? Authorizing Provider   ASPIRIN EC PO Take 81 mg by mouth daily   Yes Reported, Patient   NAPROXEN PO Take 220-440 mg by mouth daily as needed for moderate pain   Yes Reported, Patient   AmLODIPine Besylate (NORVASC PO) Take 5 mg by mouth daily   Yes Reported, Patient   HYDROCHLOROTHIAZIDE PO Take 25 mg by mouth daily   Yes Reported, Patient   Losartan Potassium (COZAAR PO) Take 100 mg by mouth daily    Yes Reported, Patient     Current Facility-Administered Medications  Ordered in Epic   Medication Dose Route Frequency Last Rate Last Dose     ceFAZolin (ANCEF) intermittent infusion 3 g (pre-mix)  3 g Intravenous Pre-Op/Pre-procedure x 1 dose   3 g at 02/01/17 1055     ceFAZolin (ANCEF) 1 g vial to attach to  ml bag for ADULT or 50 ml bag for PEDS  1 g Intravenous See Admin Instructions         lidocaine 1 % 1 mL  1 mL Other Q1H PRN         lactated ringers infusion   Intravenous Continuous 25 mL/hr at 02/01/17 1055       No current [facility]-ordered outpatient prescriptions on file.     Wt Readings from Last 1 Encounters:   02/01/17 167.831 kg (370 lb)     Temp Readings from Last 1 Encounters:   02/01/17 36.2  C (97.2  F) Temporal     BP Readings from Last 6 Encounters:   02/01/17 130/90   12/06/16 119/98   11/12/15 139/93   11/08/15 137/88   08/28/15 111/70   08/18/15 105/78     Pulse Readings from Last 4 Encounters:   12/06/16 84   11/10/15 87   08/18/15 72   01/12/15 79     Resp Readings from Last 1 Encounters:   02/01/17 16     SpO2 Readings from Last 1 Encounters:   02/01/17 96%     Recent Labs   Lab Test  02/01/17   1038  11/11/15   2345  11/11/15   0550  11/10/15   0610   12/24/14   1100   11/15/14   0553   NA   --    --    --   138   --    --    --   138   POTASSIUM  4.3   --    --   4.4   < >   --    --   4.8   CHLORIDE   --    --    --   103   --    --    --   103   CO2   --    --    --   28   --    --    --   30   ANIONGAP   --    --    --   7   --    --    --   5   GLC   --    --   110*  125*   --    --    --   136*   BUN   --    --    --   7   --   15   < >  12   CR   --   0.75   --   0.72   < >  0.73   < >  0.71   CASE   --    --    --   8.2*   --    --    --   8.2*    < > = values in this interval not displayed.     Recent Labs   Lab Test  11/12/15   0630  11/11/15   0550  11/10/15   0610  11/09/15   1456  08/28/15   1400  12/24/14   1100   WBC   --    --    --    --   8.3  6.5   HGB   --   13.3  13.7   --   15.4  15.1   PLT  207   --    --   242  289  223     No  results for input(s): INR in the last 95166 hours.    Invalid input(s): APTT   RECENT LABS:   ECG:   ECHO:   CXR:      Anesthesia Evaluation     . Pt has had prior anesthetic. Type: General    No history of anesthetic complications     ROS/MED HX    ENT/Pulmonary:     (+)sleep apnea, doesn't use CPAP , . .   (-) tobacco use, asthma and other ENT disease   Neurologic:       Cardiovascular:     (+) hypertension----. : . . . :. .      (-) arrhythmias, irregular heartbeat/palpitations and valvular problems/murmurs   METS/Exercise Tolerance:     Hematologic:         Musculoskeletal:   (+) arthritis, , , -       GI/Hepatic:     (+) GERD Asymptomatic on medication,       Renal/Genitourinary:         Endo:     (+) Obesity, .      Psychiatric:         Infectious Disease:         Malignancy:         Other:               Physical Exam  Normal systems: pulmonary    Airway   Mallampati: I  TM distance: >3 FB  Neck ROM: full    Dental   (+) chipped    Cardiovascular   Rhythm and rate: regular and normal      Pulmonary                         Anesthesia Plan      History & Physical Review  History and physical reviewed and following examination; no interval change.    ASA Status:  3 .        Plan for General and ETT with Intravenous and Propofol induction. Maintenance will be Balanced.    PONV prophylaxis:  Ondansetron (or other 5HT-3) and Dexamethasone or Solumedrol  Additional equipment: 2nd IV 12.5mg Benadryl, 4mg Decadron and Zofran for PONV prophy      Postoperative Care  Postoperative pain management:  IV analgesics and Oral pain medications.      Consents  Anesthetic plan, risks, benefits and alternatives discussed with:  Patient or representative and Patient.  Use of blood products discussed: Yes.   Use of blood products discussed with Patient.  Consented to blood products.  .                          .

## 2017-02-02 ENCOUNTER — APPOINTMENT (OUTPATIENT)
Dept: PHYSICAL THERAPY | Facility: CLINIC | Age: 62
DRG: 470 | End: 2017-02-02
Attending: ORTHOPAEDIC SURGERY
Payer: COMMERCIAL

## 2017-02-02 LAB
ANION GAP SERPL CALCULATED.3IONS-SCNC: 6 MMOL/L (ref 3–14)
BUN SERPL-MCNC: 12 MG/DL (ref 7–30)
CALCIUM SERPL-MCNC: 8.2 MG/DL (ref 8.5–10.1)
CHLORIDE SERPL-SCNC: 101 MMOL/L (ref 94–109)
CO2 SERPL-SCNC: 30 MMOL/L (ref 20–32)
CREAT SERPL-MCNC: 0.98 MG/DL (ref 0.66–1.25)
GFR SERPL CREATININE-BSD FRML MDRD: 77 ML/MIN/1.7M2
GLUCOSE SERPL-MCNC: 137 MG/DL (ref 70–99)
HGB BLD-MCNC: 14.6 G/DL (ref 13.3–17.7)
POTASSIUM SERPL-SCNC: 4.3 MMOL/L (ref 3.4–5.3)
SODIUM SERPL-SCNC: 137 MMOL/L (ref 133–144)

## 2017-02-02 PROCEDURE — 25000128 H RX IP 250 OP 636: Performed by: ORTHOPAEDIC SURGERY

## 2017-02-02 PROCEDURE — 85018 HEMOGLOBIN: CPT | Performed by: ORTHOPAEDIC SURGERY

## 2017-02-02 PROCEDURE — 97110 THERAPEUTIC EXERCISES: CPT | Mod: GP

## 2017-02-02 PROCEDURE — 25800025 ZZH RX 258: Performed by: ORTHOPAEDIC SURGERY

## 2017-02-02 PROCEDURE — 99231 SBSQ HOSP IP/OBS SF/LOW 25: CPT | Performed by: INTERNAL MEDICINE

## 2017-02-02 PROCEDURE — 36415 COLL VENOUS BLD VENIPUNCTURE: CPT | Performed by: ORTHOPAEDIC SURGERY

## 2017-02-02 PROCEDURE — 80048 BASIC METABOLIC PNL TOTAL CA: CPT | Performed by: ORTHOPAEDIC SURGERY

## 2017-02-02 PROCEDURE — 97530 THERAPEUTIC ACTIVITIES: CPT | Mod: GP

## 2017-02-02 PROCEDURE — 12000007 ZZH R&B INTERMEDIATE

## 2017-02-02 PROCEDURE — 40000193 ZZH STATISTIC PT WARD VISIT

## 2017-02-02 PROCEDURE — 25000132 ZZH RX MED GY IP 250 OP 250 PS 637: Performed by: PHYSICIAN ASSISTANT

## 2017-02-02 PROCEDURE — 97161 PT EVAL LOW COMPLEX 20 MIN: CPT | Mod: GP

## 2017-02-02 PROCEDURE — 25000128 H RX IP 250 OP 636: Performed by: PHYSICIAN ASSISTANT

## 2017-02-02 PROCEDURE — 97116 GAIT TRAINING THERAPY: CPT | Mod: GP

## 2017-02-02 PROCEDURE — 25000132 ZZH RX MED GY IP 250 OP 250 PS 637: Performed by: ORTHOPAEDIC SURGERY

## 2017-02-02 RX ORDER — LOSARTAN POTASSIUM 100 MG/1
100 TABLET ORAL DAILY
Status: DISCONTINUED | OUTPATIENT
Start: 2017-02-03 | End: 2017-02-03 | Stop reason: HOSPADM

## 2017-02-02 RX ADMIN — Medication 2 LOZENGE: at 01:00

## 2017-02-02 RX ADMIN — ONDANSETRON HYDROCHLORIDE 4 MG: 2 SOLUTION INTRAMUSCULAR; INTRAVENOUS at 11:07

## 2017-02-02 RX ADMIN — OXYCODONE HYDROCHLORIDE 10 MG: 5 TABLET ORAL at 21:22

## 2017-02-02 RX ADMIN — ENOXAPARIN SODIUM 40 MG: 40 INJECTION SUBCUTANEOUS at 21:22

## 2017-02-02 RX ADMIN — AMLODIPINE BESYLATE 5 MG: 5 TABLET ORAL at 07:54

## 2017-02-02 RX ADMIN — HYDROMORPHONE HYDROCHLORIDE 0.5 MG: 1 INJECTION, SOLUTION INTRAMUSCULAR; INTRAVENOUS; SUBCUTANEOUS at 04:10

## 2017-02-02 RX ADMIN — ENOXAPARIN SODIUM 40 MG: 40 INJECTION SUBCUTANEOUS at 09:48

## 2017-02-02 RX ADMIN — SENNOSIDES AND DOCUSATE SODIUM 2 TABLET: 8.6; 5 TABLET ORAL at 07:53

## 2017-02-02 RX ADMIN — ACETAMINOPHEN 975 MG: 325 TABLET, FILM COATED ORAL at 06:04

## 2017-02-02 RX ADMIN — OXYCODONE HYDROCHLORIDE 10 MG: 5 TABLET ORAL at 13:05

## 2017-02-02 RX ADMIN — ACETAMINOPHEN 975 MG: 325 TABLET, FILM COATED ORAL at 13:07

## 2017-02-02 RX ADMIN — ACETAMINOPHEN 975 MG: 325 TABLET, FILM COATED ORAL at 21:23

## 2017-02-02 RX ADMIN — POTASSIUM CHLORIDE, DEXTROSE MONOHYDRATE AND SODIUM CHLORIDE: 150; 5; 450 INJECTION, SOLUTION INTRAVENOUS at 03:06

## 2017-02-02 RX ADMIN — SENNOSIDES AND DOCUSATE SODIUM 2 TABLET: 8.6; 5 TABLET ORAL at 21:22

## 2017-02-02 RX ADMIN — PROCHLORPERAZINE EDISYLATE 10 MG: 5 INJECTION INTRAMUSCULAR; INTRAVENOUS at 12:05

## 2017-02-02 RX ADMIN — OXYCODONE HYDROCHLORIDE 10 MG: 5 TABLET ORAL at 07:54

## 2017-02-02 RX ADMIN — CEFAZOLIN SODIUM 2 G: 2 INJECTION, SOLUTION INTRAVENOUS at 06:04

## 2017-02-02 RX ADMIN — HYDROMORPHONE HYDROCHLORIDE 0.5 MG: 1 INJECTION, SOLUTION INTRAMUSCULAR; INTRAVENOUS; SUBCUTANEOUS at 11:07

## 2017-02-02 RX ADMIN — OXYCODONE HYDROCHLORIDE 10 MG: 5 TABLET ORAL at 17:07

## 2017-02-02 NOTE — PROGRESS NOTES
Pt refusing cont pulse ox at this time.  Reviewed with pt regarding desating & HX of RAUL.  Pt aware and does not want it.  States will wear pulse ox over night.

## 2017-02-02 NOTE — PROGRESS NOTES
[facility] Orthopedic Progress Note  Paras Ramos is a 61 year old male    Today's Date:2/2/2017  Admission Date: 2/1/2017  hardware failure right hip   S/P revision of total hip  POD # 1 Revision Right Total Hip Arthroplasty      Patient Seen.  Doing well.  Dressings are clean, dry, and intact.  Circulation, motor and sensory function, intact distally.        PLAN:  Deep venous thrombosis prophylaxis - Lovenox 40mg bid SubQ for 2 weeks  Pain control medication: No changes.  Discharge plan: Home      Temp:  [97.1  F (36.2  C)-97.9  F (36.6  C)] 97.9  F (36.6  C)  Heart Rate:  [] 83  Resp:  [10-18] 18  BP: ()/(57-90) 116/76 mmHg  SpO2:  [94 %-97 %] 95 %      Results for orders placed or performed during the hospital encounter of 02/01/17 (from the past 24 hour(s))   ABO/Rh type and screen   Result Value Ref Range    ABO A     RH(D)  Pos     Antibody Screen Neg     Test Valid Only At [facility]     Specimen Expires 02/04/2017    Potassium   Result Value Ref Range    Potassium 4.3 3.4 - 5.3 mmol/L   XR Pelvis w Hip Right 1 View    Narrative    XR PELVIS AND HIP RIGHT 1 VIEW   2/1/2017 3:46 PM     HISTORY: Post Op Hip Arthroplasty    COMPARISON: None.      Impression    IMPRESSION: Right total hip arthroplasty in place. This appears  somewhat change from the prior study and may be the result of new  components or at least a new acetabular component. No evidence of  complication.    KING NIEVES MD   Physical Therapy Adult IP Consult    Narrative    Pam Gutierres PA-C     2/1/2017  6:22 PM  [facility]    Hospitalist Consultation    Date of Admission:  2/1/2017  Date of Consult (When I saw the patient): 02/01/2017    Assessment and Plan  Paras Ramos is a 61 year old male with a past medical history   of HTN, GERD, RAUL not on CPAP, obesity, and osteoarthritis  who   underwent an elective, uncomplicated revision of a right total   hip  arthroplasty.    Summary:    -POD# 0 Right total hip arthroplasty revision,  Due to recurrent   hip dislocations   -Pain and anticoagulation management per orthopedics   -Incentive spirometry   -PT/OT   -QING MONTES IV fluids once tolerating oral intake    -Hypertension   -antihypertensives restarted on 1/24 per preoperative H &P as he   was not taking them prior to this date   -Will plan to resume amlodipine 5 mg in am, pending BMP and BP   will resume other PTA meds thereafter   -PTA on Amlodipine 5 mg, HCTZ 25 mg and Losartan 100 mg   -will order prn hydralazine for SBP greater than 180 in the   event of significant hypertension    -Obstructive Sleep Apnea, unable to tolerate CPAP   -follow   -has notable desaturations at night and suspect these will   continue, continue supplemental oxygen via NC, he does not wear   oxygen at home   -has not yet attempted treatment with a mandibular advancement   device    -Morbid Obesity, BMI 48   -Diet and Exercise modifications    DVT Prophylaxis: Defer to primary service  Code Status: Full Code    Disposition: Per primary team    Pam Gutierres PA-C  Pager [phone]     This patient was discussed with Dr. Barker of the Hospitalist   Service who agrees with current plans as outlined above.     Reason for Consult  Reason for consult: I was asked by Cesario to evaluate this   patient for post operative medical management.    Primary Care Physician  [facility]    Chief Complaint  HTN    History is obtained from the patient,discussion with the nurse,   and chart review    History of Present Illness  Paras Ramos is a 61 year old male with a past medical history   of HTN, GERD, RAUL not on CPAP, obesity, recurrent hip   dislocations, and osteoarthritis  who underwent an elective,   uncomplicated revision of a right total hip arthroplasty.  There   was an estimated 250 milliliters of blood loss.  Post operatively   he had a brief episode of asymptomatic  hypotension (BP 88/58).    This has since resolved.     While sleeping he continues to have ongoing desaturations due to   his untreated sleep apnea. He does not wear supplemental oxygen   at home.    Post operatively his only complaint is mild pain and mild cramps   in the right leg.  He denies any chest pain/pressure, shortness   of breath, nausea, vomiting or dizziness.      Past Medical History   I have reviewed this patient's medical history and updated it   with pertinent information if needed.   Past Medical History   Diagnosis Date     Hypertension      Gastro-oesophageal reflux disease      Sleep apnea      no cpap     Arthritis      OA of ankle and hip     Hip dislocation, right (H)      ED (erectile dysfunction)      Colon polyp      Testicular hypofunction      OA (osteoarthritis) of ankle      Osteoarthritis of hip        Past Surgical History  I have reviewed this patient's surgical history and updated it   with pertinent information if needed.  Past Surgical History   Procedure Laterality Date     Cholecystectomy       Colonoscopy       Arthroplasty ankle  12/9/2013     Procedure: ARTHROPLASTY ANKLE;  RIGHT TOTAL ANKLE ARTHROPLASTY,   SUBTALAR FUSION, LIGAMENT REPAIR ([place])^;  Surgeon:   Venkata Mccallum MD;  Location: [facility]     Repair ligament ankle  12/9/2013     Procedure: REPAIR LIGAMENT ANKLE;;  Surgeon: Venkata Mccallum MD;  Location: [facility]     Arthrodesis ankle  12/9/2013     Procedure: ARTHRODESIS ANKLE;;  Surgeon: Venkata Mccallum MD;  Location: [facility]     Arthroplasty hip  7/23/2014     Procedure: ARTHROPLASTY HIP;  Surgeon: Cirilo Nassar MD;    Location:  OR     Arthroplasty revision hip Right 11/14/2014     Procedure: ARTHROPLASTY REVISION HIP;  Surgeon: Cirilo Nassar MD;  Location:  OR     Arthroplasty revision hip Right 11/9/2015     Procedure: ARTHROPLASTY REVISION HIP;  Surgeon: José Estrella MD;  Location:  OR       Prior to Admission  Medications  Prior to Admission Medications   Prescriptions Last Dose Informant Patient Reported? Taking?   ASPIRIN EC PO 1/25/2017 Self Yes Yes   Sig: Take 81 mg by mouth daily   AmLODIPine Besylate (NORVASC PO) 2/1/2017 at 0700 Self Yes Yes   Sig: Take 5 mg by mouth daily   HYDROCHLOROTHIAZIDE PO 2/1/2017 at 0700 Self Yes Yes   Sig: Take 25 mg by mouth daily   Losartan Potassium (COZAAR PO) 2/1/2017 at 0700 Self Yes Yes   Sig: Take 100 mg by mouth daily  NAPROXEN PO 1/25/2017 at prn   Self Yes Yes   Sig: Take 220-440 mg by mouth daily as needed for moderate pain      Facility-Administered Medications: None     Allergies  No Known Allergies    Social History  I have reviewed this patient's social history and updated it with   pertinent information if needed. Paras Ramos  reports that he   has never smoked. He does not have any smokeless tobacco history   on file. He reports that he drinks alcohol. He reports that he   does not use illicit drugs.    Family History  I have reviewed this patient's family history and updated it with   pertinent information if needed.   History reviewed. No pertinent family history.    Review of Systems  The 10 point Review of Systems is negative other than noted in   the HPI or here.     Physical Exam  Temp: 97.8  F (36.6  C) Temp src: Oral BP: 111/73 mmHg   Heart   Rate: 85 Resp: 14 SpO2: 96 % O2 Device: Nasal cannula Oxygen   Delivery: 2 LPM  Vital Signs with Ranges  Temp:  [97.1  F (36.2  C)-97.8  F (36.6  C)] 97.8  F (36.6  C)  Heart Rate:  [64-93] 85  Resp:  [10-16] 14  BP: ()/(57-90) 111/73 mmHg  SpO2:  [94 %-97 %] 96 %  370 lbs 0 oz    Constitutional: Alert and oriented x3, no acute distress  Eyes: PERRL, EOMs intact  HEENT: patent nares, supple neck  Respiratory: clear to auscultation anteriorly  Cardiovascular: distant, regular rate and rhythm, no murmurs  GI: morbidly obese, soft, non tender, non distended, bowel sounds   present  Lymph/Hematologic: no evidence of  jaundice or bruising  Genitourinary: deferred, no nguyen catheter in place  Skin: warm and dry, no rashes  Musculoskeletal: able to move all extremities, neurovascularly   intact in the bilateral lower extremities, trace edema, right hip   wound site not examined  Neurologic: no focal neurologic deficits, gait not examined  Psychiatric: affect normal, answers questions appropriately    Data  -Data reviewed today: All pertinent laboratory and imaging   results from this encounter were reviewed. I personally reviewed   no images or EKG's today.    Recent Labs  Lab 02/01/17  1038   POTASSIUM 4.3       Imaging:  Recent Results (from the past 24 hour(s))   XR Pelvis w Hip Right 1 View    Narrative    XR PELVIS AND HIP RIGHT 1 VIEW   2/1/2017 3:46 PM     HISTORY: Post Op Hip Arthroplasty    COMPARISON: None.      Impression    IMPRESSION: Right total hip arthroplasty in place. This appears  somewhat change from the prior study and may be the result of new  components or at least a new acetabular component. No evidence of  complication.    KING NIEVES MD                Platelet count   Result Value Ref Range    Platelet Count 198 150 - 450 10e9/L   Creatinine   Result Value Ref Range    Creatinine 1.02 0.66 - 1.25 mg/dL    GFR Estimate 74 >60 mL/min/1.7m2    GFR Estimate If Black 89 >60 mL/min/1.7m2   Hemoglobin   Result Value Ref Range    Hemoglobin 14.6 13.3 - 17.7 g/dL   Basic metabolic panel   Result Value Ref Range    Sodium 137 133 - 144 mmol/L    Potassium 4.3 3.4 - 5.3 mmol/L    Chloride 101 94 - 109 mmol/L    Carbon Dioxide 30 20 - 32 mmol/L    Anion Gap 6 3 - 14 mmol/L    Glucose 137 (H) 70 - 99 mg/dL    Urea Nitrogen 12 7 - 30 mg/dL    Creatinine 0.98 0.66 - 1.25 mg/dL    GFR Estimate 77 >60 mL/min/1.7m2    GFR Estimate If Black >90   GFR Calc   >60 mL/min/1.7m2    Calcium 8.2 (L) 8.5 - 10.1 mg/dL         Cirilo Dumont

## 2017-02-02 NOTE — PLAN OF CARE
Problem: Goal Outcome Summary  Goal: Goal Outcome Summary  Initial evaluation completed and treatment initiated.  Pt is s/p right total hip revision arthroplasty.  Pt lives with his son who will be able to provide assist but not all of the time.  Pt reports other family members will be able to assist as needed.  Pt has a FWW and crutches at home.  Prior to admission, pt IND with mobility and ADL's.        Surgeon Discharge Plan:  Likely home tomorrow     Current Functional Status:  Supine to sit with SBA, CGA and FWW with functional transfers, CGA and FWW with ambulation up to 250', sit to supine with modA with LE's.      Barriers to Plan/Home:  Stairs, will not have 24 hour assist.

## 2017-02-02 NOTE — PLAN OF CARE
Problem: Goal Outcome Summary  Goal: Goal Outcome Summary  Outcome: Improving  Pt continues to complain of pain and was given oxy Q3H. Pt complained of nausea after breakfast and was given zofran and compazine with improvement.

## 2017-02-02 NOTE — PROGRESS NOTES
[facility]    Hospitalist Progress Note    Date of Service (when I saw the patient): 02/02/2017    Assessment and Plan  Paras Ramos is a 61 year old male with a past medical history of HTN, GERD, RAUL not on CPAP, obesity, and osteoarthritis who is admitted 2/1/2017 after an elective, uncomplicated revision of a right total hip arthroplasty. Hospitalists consulted for medical management.     Recurrent hip dislocations status post right total hip arthroplasty revision 2/1/17   Due to recurrent hip dislocations.  - Post-operative cares including pain management, DVT ppx, activity per orthopedic surgery service  - IVF has been discontinued, tolerating diet well    Hypertension  - Blood pressures well controlled. Continue prior to admission amlodipine.   - Renal function stable  - Restart losartan 2/3/17  - Hold HCTZ while hospitalized unless BP becoming more severely elevated to reduce risk of hypovolemia    Obstructive Sleep Apnea  Unable to tolerate CPAP. Has not yet attempted treatment with a mandibular advancement device  - Continue oxygen at night     Morbid Obesity  BMI 48. Diet and exercise modifications    DVT Prophylaxis: Defer to primary service  Code Status: Full Code    Disposition: Expected discharge per primary service    Slick Morales    Interval History  No new symptoms. Denies chest pain or shortness of breath. Eating/drinking well.     -Data reviewed today: I reviewed all new labs and imaging results over the last 24 hours. I personally reviewed no images or EKG's today.    Physical Exam  Temp: 97.5  F (36.4  C) Temp src: Oral BP: 113/71 mmHg Pulse: 82 Heart Rate: 63 Resp: 16 SpO2: 94 % O2 Device: None (Room air) Oxygen Delivery: 2 LPM  Filed Vitals:    02/01/17 1028   Weight: 167.831 kg (370 lb)     Vital Signs with Ranges  Temp:  [97.1  F (36.2  C)-97.9  F (36.6  C)] 97.5  F (36.4  C)  Pulse:  [82] 82  Heart Rate:  [] 63  Resp:  [10-20] 16  BP: ()/(58-77) 113/71  mmHg  SpO2:  [94 %-97 %] 94 %  I/O last 3 completed shifts:  In: 2552 [P.O.:500; I.V.:2052]  Out: 875 [Urine:725; Drains:150]    Constitutional: Well-appearing, NAD  Respiratory: Clear to auscultation bilaterally, good air movement bilaterally  Cardiovascular: RRR, no m/r/g. No peripheral edema.  GI: Soft, non-tender, non-distended. BS normoactive.   Skin/Integumen: Warm, dry  Other:     Medications    dextrose 5% and 0.45% NaCl + KCl 20 mEq/L 100 mL/hr at 02/02/17 0306       [START ON 2/3/2017] influenza quadrivalent (PF) vacc age 3 yrs and older  0.5 mL Intramuscular Prior to discharge     enoxaparin  40 mg Subcutaneous Q12H     [START ON 2/3/2017] losartan (COZAAR) tablet 100 mg  100 mg Oral Daily     sodium chloride (PF)  3 mL Intracatheter Q8H     acetaminophen  975 mg Oral Q8H     senna-docusate  1-2 tablet Oral BID     amLODIPine  5 mg Oral Daily       Data    Recent Labs  Lab 02/02/17  0630 02/01/17  1832 02/01/17  1038   HGB 14.6  --   --    PLT  --  198  --      --   --    POTASSIUM 4.3  --  4.3   CHLORIDE 101  --   --    CO2 30  --   --    BUN 12  --   --    CR 0.98 1.02  --    ANIONGAP 6  --   --    CASE 8.2*  --   --    *  --   --        No results found for this or any previous visit (from the past 24 hour(s)).

## 2017-02-02 NOTE — PROGRESS NOTES
" 02/02/17 0800   Quick Adds   Type of Visit Initial PT Evaluation   Living Environment   Lives With child(madeleine), adult   Living Arrangements house   Home Accessibility stairs to enter home;tub/shower is not walk in   Number of Stairs to Enter Home 13   Number of Stairs Within Home 0   Stair Railings at Home outside, present at both sides   Transportation Available family or friend will provide   Living Environment Comment PT: pt lives with his son who will be able to provide assist but not all of the time.  Pt reports other family members will be able to assist as needed.  Pt has a FWW and crutches at home.    Self-Care   Dominant Hand right   Usual Activity Tolerance moderate   Current Activity Tolerance fair   Regular Exercise no   Equipment Currently Used at Home none   Functional Level Prior   Ambulation 0-->independent   Transferring 0-->independent   Toileting 0-->independent   Bathing 0-->independent   Dressing 0-->independent   Eating 0-->independent   Communication 0-->understands/communicates without difficulty   Swallowing 0-->swallows foods/liquids without difficulty   Cognition 0 - no cognition issues reported   Fall history within last six months yes   Number of times patient has fallen within last six months 1   Which of the above functional risks had a recent onset or change? ambulation;transferring   General Information   Onset of Illness/Injury or Date of Surgery - Date 02/01/17   Referring Physician Cirilo Nassar MD   Patient/Family Goals Statement \"Just to be able to get up and get around.\"   Pertinent History of Current Problem (include personal factors and/or comorbidities that impact the POC) s/p Right total hip revision arthroplasty.    Precautions/Limitations fall precautions;right hip precautions   Weight-Bearing Status - RLE weight-bearing as tolerated   General Observations PT: pt supine in bed upon arrival and agreeable to PT session.    General Info Comments Activity: Ambulate with " "assist 3 TIMES DAILY     Cognitive Status Examination   Orientation orientation to person, place and time   Level of Consciousness alert   Follows Commands and Answers Questions 100% of the time   Personal Safety and Judgment intact   Pain Assessment   Patient Currently in Pain Yes, see Vital Sign flowsheet  (2/10 )   Strength   Strength Comments PT: functional weakness as pt has difficulty with transfers and ambulation.   Bed Mobility   Bed Mobility Comments PT: supine to sit with SBA with cues for sequence.  Sit to supine with modA with LE's.   Transfer Skills   Transfer Comments PT: CGA and FWW.   Gait   Gait Comments PT: CGA and FWW.    Balance   Balance no deficits were identified   General Therapy Interventions   Planned Therapy Interventions bed mobility training;gait training;strengthening;transfer training;home program guidelines;progressive activity/exercise   Clinical Impression   Criteria for Skilled Therapeutic Intervention yes, treatment indicated   PT Diagnosis difficulty with transfers and ambulation.   Influenced by the following impairments s/p Right total hip revision arthroplasty.    Functional limitations due to impairments impaired functional mobility and impaired functional activity tolerance.   Clinical Presentation Stable/Uncomplicated   Clinical Presentation Rationale musculoskeletal system assessed.   Clinical Decision Making (Complexity) Low complexity   Therapy Frequency` 2 times/day   Predicted Duration of Therapy Intervention (days/wks) 3 days   Anticipated Equipment Needs at Discharge other (see comments)  (none anticipated - has FWW and crutches )   Anticipated Discharge Disposition Home with Assist   Risk & Benefits of therapy have been explained Yes   Patient, Family & other staff in agreement with plan of care Yes   [facility] AM-PAC TM \"6 Clicks\"   2016, Trustees of [facility], under license to [employer].  All rights reserved.   6 Clicks Short Forms Basic " "Mobility Inpatient Short Form   [facility] AM-PAC  \"6 Clicks\" V.2 Basic Mobility Inpatient Short Form   1. Turning from your back to your side while in a flat bed without using bedrails? 3 - A Little   2. Moving from lying on your back to sitting on the side of a flat bed without using bedrails? 3 - A Little   3. Moving to and from a bed to a chair (including a wheelchair)? 3 - A Little   4. Standing up from a chair using your arms (e.g., wheelchair, or bedside chair)? 3 - A Little   5. To walk in hospital room? 3 - A Little   6. Climbing 3-5 steps with a railing? 2 - A Lot   Basic Mobility Raw Score (Score out of 24.Lower scores equate to lower levels of function) 17   Total Evaluation Time   Total Evaluation Time (Minutes) 10     "

## 2017-02-02 NOTE — PROGRESS NOTES
POD#1 R hip revision    Doing well  Comfortable  AVSS  CMS intact  Xray looks good  PT/OT/Lovenox  Likely home tomorrow    Cirilo Nassar

## 2017-02-02 NOTE — PLAN OF CARE
Problem: Goal Outcome Summary  Goal: Goal Outcome Summary  Outcome: Improving  Pain well controlled with IV Dilaudid , dressing clean, dry and intact, CMS is WNL, VSS, taking  fluids without nausea. Hemovac intact and patent. Due to void.

## 2017-02-02 NOTE — PLAN OF CARE
Problem: Goal Outcome Summary  Goal: Goal Outcome Summary  Outcome: Improving  Patient A&Ox4, VSS ecexept Oxygen desat when sleeping @ 2Lpm on NC to 87-89%. Improved O2 @ 4LPM of oxymask 95%. Dressing c/d/i. Hemovac patent. CMS numb on Rt LE baseline. Pain managed with Iv dilaudid & tylenol. Void on Urinal 475 cc. Passing gas. Diet advanced to Regular. Will continue monitoring.

## 2017-02-02 NOTE — OP NOTE
DATE OF PROCEDURE:  02/01/2017       PREOPERATIVE DIAGNOSIS:  Right hip failed total hip arthroplasty, recurrent instability.      POSTOPERATIVE DIAGNOSIS:  Right hip failed total hip arthroplasty, recurrent instability.      PROCEDURE:  Right total hip revision arthroplasty (conversion to constrained liner, retention of acetabular and femoral components).      SURGEON:  Cirilo Nassar MD      ASSISTANT:  Cirilo Dumont PA-C       COMPLICATIONS:  None.       INDICATIONS:  Paras Ramos was brought to the operating room on 02/01/2017 for elective revision of his right total hip prosthesis.  He unfortunately has recurrent instability approximately 1 year status post revision of his unstable prosthesis with conversion to a longer, larger femoral head component and retention of his femoral and acetabular components determined to have been appropriately oriented.  Unfortunately, after doing well for approximately 6 months, he redeveloped instability with subluxation episodes and one major dislocation episode requiring reduction in the emergency room under conscious sedation.  Due to his persistent recurrent instability, we made the decision to perform another revision operation, converting his current prosthesis to a constrained liner construct.  He was seen in the preoperative holding area.  The right hip was marked as the correct operative site.  He received a dose of IV cefazolin within 30 minutes prior to surgical incision.  Postoperative antibiotic and DVT prophylaxis are indicated and will be prescribed.  A skilled assistant was used for position, draping, retraction, closure and dressing application.      IMPLANTS:  Dickson/Biomet longevity/Trilogy constrained liner implant and polyethylene.    1.  Outer diameter shell, 62 mm and outer diameter femoral head 36 mm.   2.  +7 length, 36 mm cobalt chrome femoral head.        DESCRIPTION OF PROCEDURE:  The patient was brought to the operating room and placed under  general anesthesia.  He was positioned lateral decubitus with the right hip up.  The right lower extremity was prepped and draped in the standard sterile fashion.  Preoperative time out was taken.  I reopened his previous posterolateral surgical incision and dissected through skin, subcutaneous tissue down to deep fascia, which was incised longitudinally.  The posterior hip approach was recreated by removing scar tissue to expose the back of the acetabular component rim, liner and femoral head.  We inspected the stability of the prosthesis after exposing it, and with the hip flexed to 90 degrees, his prosthesis would sublux with internal rotation to approximately 20 degrees.  I removed the femoral head with a drift.  I removed anterior and superior scar tissue and positioned the neck of the femoral prosthesis anterosuperior to the acetabular component.  Scar tissue was debrided circumferentially, exposing the entirety of the acetabular component.  I  the 2 prongs on the fixation ring, and removed the polyethylene liner.  The ring was noted to be of good integrity.  I completed debridement of scar tissue circumferentially to allow for placement of the constrained liner and ring.  I placed the trial constrained liner into the exposed and debrided acetabular component to ensure that it would appropriately seat.  It seated easily.  The acetabular component stability was confirmed to be excellent as there was no evidence of any loosening.  I implanted the constrained liner polyethylene into position with the fixation prongs at the 11 o'clock and 5 o'clock positions.  I then placed the constrained ring over the modular neck and implanted the +7 length, 36 mm cobalt chrome femoral head onto the Paul taper.  I then reduced the head into the acetabular polyethylene by dropping it between the 2 prongs and appropriately seating it.  I then impacted the fixation ring down onto the constrained liner polyethylene in the  appropriate position, nicely compressing the 2 polyethylene prongs.  It was confirmed to be appropriately seated.  We inspected the hip.  With the hip flexed to 90 degrees, I could internally rotate to approximately 60 degrees before there was any significant tension.  No subluxation was encountered.  It should be noted that the femoral component was determined to be appropriately ingrown without any evidence of loosening prior to implanting the new components.  I also carefully inspected the soft tissues, and there was nothing to suggest infection.  I did not feel that fluid or tissue needed to be sent for cultures.  After completion of the revision, we irrigated extensively with a Betadine wash followed by thorough double antibiotic irrigation.  I released tissue back to the posteromedial gluteus medius tendon with Ethibond sutures.  I then closed the fascia over a medium Hemovac drain.  The remainder of the superficial soft tissues were irrigated and closed in layers.  A sterile dressing and a hip abduction pillow were applied.  The patient was extubated and brought to the recovery room in stable condition.  Needle and lap counts were correct at the end of the case.  There were no known complications.         ZEE KHAN MD             D: 2017 14:56   T: 2017 22:04   MT: [id number]      Name:     LARA FREY   MRN:      [id number]        Account:        [id number]   :      1955           Procedure Date: 2017      Document: [id number]

## 2017-02-03 ENCOUNTER — APPOINTMENT (OUTPATIENT)
Dept: PHYSICAL THERAPY | Facility: CLINIC | Age: 62
DRG: 470 | End: 2017-02-03
Attending: ORTHOPAEDIC SURGERY
Payer: COMMERCIAL

## 2017-02-03 VITALS
WEIGHT: 315 LBS | BODY MASS INDEX: 40.43 KG/M2 | DIASTOLIC BLOOD PRESSURE: 63 MMHG | SYSTOLIC BLOOD PRESSURE: 101 MMHG | RESPIRATION RATE: 16 BRPM | OXYGEN SATURATION: 95 % | TEMPERATURE: 98.9 F | HEIGHT: 74 IN | HEART RATE: 92 BPM

## 2017-02-03 LAB — HGB BLD-MCNC: 13.5 G/DL (ref 13.3–17.7)

## 2017-02-03 PROCEDURE — 99231 SBSQ HOSP IP/OBS SF/LOW 25: CPT | Performed by: INTERNAL MEDICINE

## 2017-02-03 PROCEDURE — 25000128 H RX IP 250 OP 636: Performed by: ORTHOPAEDIC SURGERY

## 2017-02-03 PROCEDURE — 25000132 ZZH RX MED GY IP 250 OP 250 PS 637: Performed by: PHYSICIAN ASSISTANT

## 2017-02-03 PROCEDURE — 97116 GAIT TRAINING THERAPY: CPT | Mod: GP

## 2017-02-03 PROCEDURE — 25000132 ZZH RX MED GY IP 250 OP 250 PS 637: Performed by: ORTHOPAEDIC SURGERY

## 2017-02-03 PROCEDURE — 90686 IIV4 VACC NO PRSV 0.5 ML IM: CPT | Performed by: ORTHOPAEDIC SURGERY

## 2017-02-03 PROCEDURE — 40000193 ZZH STATISTIC PT WARD VISIT

## 2017-02-03 PROCEDURE — 85018 HEMOGLOBIN: CPT | Performed by: ORTHOPAEDIC SURGERY

## 2017-02-03 PROCEDURE — 97110 THERAPEUTIC EXERCISES: CPT | Mod: GP

## 2017-02-03 PROCEDURE — 25000132 ZZH RX MED GY IP 250 OP 250 PS 637: Performed by: INTERNAL MEDICINE

## 2017-02-03 PROCEDURE — 25000128 H RX IP 250 OP 636: Performed by: PHYSICIAN ASSISTANT

## 2017-02-03 PROCEDURE — 36415 COLL VENOUS BLD VENIPUNCTURE: CPT | Performed by: ORTHOPAEDIC SURGERY

## 2017-02-03 RX ORDER — OXYCODONE HYDROCHLORIDE 5 MG/1
5-10 TABLET ORAL
Qty: 75 TABLET | Refills: 0 | Status: ON HOLD | OUTPATIENT
Start: 2017-02-03 | End: 2021-03-08

## 2017-02-03 RX ORDER — AMOXICILLIN 250 MG
1-2 CAPSULE ORAL 2 TIMES DAILY
Qty: 30 TABLET | Refills: 1 | Status: ON HOLD | OUTPATIENT
Start: 2017-02-03 | End: 2021-03-08

## 2017-02-03 RX ADMIN — AMLODIPINE BESYLATE 5 MG: 5 TABLET ORAL at 08:36

## 2017-02-03 RX ADMIN — ACETAMINOPHEN 975 MG: 325 TABLET, FILM COATED ORAL at 05:41

## 2017-02-03 RX ADMIN — INFLUENZA A VIRUS A/CALIFORNIA/7/2009 X-179A (H1N1) ANTIGEN (FORMALDEHYDE INACTIVATED), INFLUENZA A VIRUS A/HONG KONG/4801/2014 X-263B (H3N2) ANTIGEN (FORMALDEHYDE INACTIVATED), INFLUENZA B VIRUS B/PHUKET/3073/2013 ANTIGEN (FORMALDEHYDE INACTIVATED), AND INFLUENZA B VIRUS B/BRISBANE/60/2008 ANTIGEN (FORMALDEHYDE INACTIVATED) 0.5 ML: 15; 15; 15; 15 INJECTION, SUSPENSION INTRAMUSCULAR at 08:36

## 2017-02-03 RX ADMIN — SENNOSIDES AND DOCUSATE SODIUM 1 TABLET: 8.6; 5 TABLET ORAL at 08:37

## 2017-02-03 RX ADMIN — ACETAMINOPHEN 975 MG: 325 TABLET, FILM COATED ORAL at 14:59

## 2017-02-03 RX ADMIN — ENOXAPARIN SODIUM 40 MG: 40 INJECTION SUBCUTANEOUS at 08:36

## 2017-02-03 RX ADMIN — LOSARTAN POTASSIUM 100 MG: 100 TABLET, FILM COATED ORAL at 08:36

## 2017-02-03 RX ADMIN — OXYCODONE HYDROCHLORIDE 5 MG: 5 TABLET ORAL at 08:36

## 2017-02-03 NOTE — PROGRESS NOTES
POD#2 R Hip Revision    Doing fine  AVSS  CMS intact  Dressing CDI  Plan Home Today after PTx2  PT/OT/Lovenox  FU with me in 2 weeks    Cirilo Nassar

## 2017-02-03 NOTE — PROGRESS NOTES
[facility]    Hospitalist Progress Note    Date of Service (when I saw the patient): 02/03/2017    Assessment and Plan  Paras Ramos is a 61 year old male with a past medical history of HTN, GERD, RAUL not on CPAP, obesity, and osteoarthritis who is admitted 2/1/2017 after an elective, uncomplicated revision of a right total hip arthroplasty. Hospitalists consulted for medical management.    Recurrent hip dislocations status post right total hip arthroplasty revision 2/1/17   Due to recurrent hip dislocations.  - Post-operative cares including pain management, DVT ppx, activity per orthopedic surgery service    Hypertension  - Blood pressures well-controlled. Resume prior to admission blood pressure medications on discharge.     Obstructive Sleep Apnea  Unable to tolerate CPAP. Has not yet attempted treatment with a mandibular advancement device  - Discussed with patient. He reports he has not followed up with sleep clinic for over a year. Encouraged him to follow-up with sleep clinic as soon as possible again to ensure he has exhausted all options for treatment of RAUL, and to potentially consider nocturnal oxygen if there are no devices he tolerates. Discussed importance of RAUL treatment and possible cardiac complications of untreated RAUL. Patient expressed understanding.     Morbid Obesity  BMI 48. Diet and exercise modifications recommended. Follow-up with primary care provider for ongoing management.    DVT Prophylaxis: Defer to primary service  Code Status: Full Code    Disposition: Expected discharge per primary service. No barriers to discharge from hospitalist perspective.     Slick Morales    Interval History  No new symptoms. Denies chest pain or shortness of breath. Eating/drinking well. Feels ready to discharge.     -Data reviewed today: I reviewed all new labs and imaging results over the last 24 hours. I personally reviewed no images or EKG's today.    Physical Exam  Temp: 98.3  F  (36.8  C) Temp src: Oral BP: 99/64 mmHg Pulse: 84 Heart Rate: 85 Resp: 16 SpO2: 93 % O2 Device: None (Room air) Oxygen Delivery: 4 LPM  Filed Vitals:    02/01/17 1028   Weight: 167.831 kg (370 lb)     Vital Signs with Ranges  Temp:  [97.5  F (36.4  C)-98.6  F (37  C)] 98.3  F (36.8  C)  Pulse:  [84] 84  Heart Rate:  [63-96] 85  Resp:  [16-18] 16  BP: ()/(64-80) 99/64 mmHg  SpO2:  [91 %-99 %] 93 %  I/O last 3 completed shifts:  In: 300 [P.O.:300]  Out: 2100 [Urine:1850; Drains:250]    Constitutional: Well-appearing, NAD  Respiratory: Clear to auscultation bilaterally, good air movement bilaterally  Cardiovascular: RRR, no m/r/g. No peripheral edema.  GI: Soft, non-tender, non-distended. BS normoactive.   Skin/Integumen: Warm, dry  Other:     Medications    dextrose 5% and 0.45% NaCl + KCl 20 mEq/L 100 mL/hr at 02/02/17 0306       enoxaparin  40 mg Subcutaneous Q12H     losartan (COZAAR) tablet 100 mg  100 mg Oral Daily     sodium chloride (PF)  3 mL Intracatheter Q8H     acetaminophen  975 mg Oral Q8H     senna-docusate  1-2 tablet Oral BID     amLODIPine  5 mg Oral Daily       Data    Recent Labs  Lab 02/03/17  0654 02/02/17  0630 02/01/17  1832 02/01/17  1038   HGB 13.5 14.6  --   --    PLT  --   --  198  --    NA  --  137  --   --    POTASSIUM  --  4.3  --  4.3   CHLORIDE  --  101  --   --    CO2  --  30  --   --    BUN  --  12  --   --    CR  --  0.98 1.02  --    ANIONGAP  --  6  --   --    CASE  --  8.2*  --   --    GLC  --  137*  --   --        No results found for this or any previous visit (from the past 24 hour(s)).

## 2017-02-03 NOTE — PLAN OF CARE
Problem: Goal Outcome Summary  Goal: Goal Outcome Summary  Outcome: Improving  Pt is A&O x4, CMS and hemovac intact. Dressing CDI. Oxycodone PRN for pain and relief. Pt has oxygen 4 L at sleep. R thigh is numbness as baseline.

## 2017-02-03 NOTE — PLAN OF CARE
Problem: Goal Outcome Summary  Goal: Goal Outcome Summary  Outcome: Improving  A&OX4.  Voiding adequately using urinal.  Hemovac patent.  Scheduled tylenol given for pain.  4L NC while sleeping, refused CPAP.  Possible d/c home today.

## 2017-02-03 NOTE — DISCHARGE INSTRUCTIONS
DISCHARGE INSTRUCTIONS FOR YOUR TOTAL HIP REPLACEMENT     ZEE KHAN MD    Wound Care:    Change your dressing daily. Inspect your incision at the time of dressing change for increased redness, swelling, and drainage.  Some discoloration and bruising is usually seen, but call my office if you are concerned or if you see changes in the wound appearance.  Also, call if you develop a fever above 101 degrees.     You may shower directly over the wound beginning 4 days after your surgery, but do not submerge wound under water until the sutures are removed and the wound is completely sealed and without any drainage. Keep a dressing over the wound until after your post operative clinic visit when the sutures are removed.      Activities and outpatient Physical Therapy:  Physical activity may be resumed gradually according to your comfort level and the restrictions on your hip positioning as instructed in the pre-op class and by therapy. Do not cross your legs and do not flex your hip up past 90 degrees. You may bear weight as tolerated on the operated leg.  Use crutches or the walker as instructed by Physical Therapy.  Follow your home exercise program as instructed by your therapist.     Wear your anti-embolism stockings day and night until seen for your post operative appointment.  Keep them flat on your skin.  Do not allow them to roll up or crease your skin.  Remove twice daily for one hour at a time.  You may hand wash and air dry your stockings.  Notify my office if you experience new pain behind your calf or thigh.  Pump your ankles frequently.        Outpatient Physical Therapy visits are required following discharge from the hospital.  The referral for these outpatient therapy visits is routinely given to you at the time of your surgical scheduling. You should have already scheduled your therapy sessions in advance.  If you have not done so, please immediately contact the therapy location of your choice to  schedule the appointments for the physical therapy regimen that has been prescribed for you. You may discontinue the crutches or walker per the therapist's recommendation.      Medications:  New medications for you on discharge include a pain medication, a stool softener, and a blood thinner.  Detailed instructions come with those medications.  You will also receive instructions on when to resume your home medications.     Antibiotic coverage will be needed before any type of dental procedure for at least the next two years due to the risk of infection.  After that, antibiotic coverage is optional. You should notify your dentist of your total hip surgery and call your dentist or our office one week before a dental appointment for antibiotics.         Post operative clinic appointment:  Your post operative clinic visit has been prearranged.   Call [phone] with any questions.    Cirilo Nassar MD

## 2017-02-03 NOTE — DISCHARGE SUMMARY
Discharge Summary    Paras Ramos MRN# [id number]   YOB: 1955 Age: 61 year old     Date of Admission:  2/1/2017  Date of Discharge:  2/4/17  Admitting Physician:  Cirilo Nassar MD  Discharge Physician:  Cirilo Nassar MD     Primary Provider: Group, [place] [address]          Admission Diagnoses:   Right total hip arthroplasty instability          Discharge Diagnosis:   Same           Surgical Procedure:   Total Hip arthroplasty right revision           Discharge Disposition:   Discharged to home           Medications Prior to Admission:     Prescriptions prior to admission   Medication Sig Dispense Refill Last Dose     AmLODIPine Besylate (NORVASC PO) Take 5 mg by mouth daily   2/1/2017 at 0700     HYDROCHLOROTHIAZIDE PO Take 25 mg by mouth daily   2/1/2017 at 0700     Losartan Potassium (COZAAR PO) Take 100 mg by mouth daily    2/1/2017 at 0700     [DISCONTINUED] ASPIRIN EC PO Take 81 mg by mouth daily   1/25/2017     [DISCONTINUED] NAPROXEN PO Take 220-440 mg by mouth daily as needed for moderate pain   1/25/2017 at prn             Discharge Medications:     Current Discharge Medication List      START taking these medications    Details   oxyCODONE (ROXICODONE) 5 MG IR tablet Take 1-2 tablets (5-10 mg) by mouth every 3 hours as needed for moderate to severe pain  Qty: 75 tablet, Refills: 0    Associated Diagnoses: S/P revision of total hip      enoxaparin (LOVENOX) 40 MG/0.4ML injection Inject 0.4 mLs (40 mg) Subcutaneous every 12 hours for 12 days  Qty: 9.6 mL, Refills: 0    Associated Diagnoses: S/P revision of total hip      senna-docusate (SENOKOT-S;PERICOLACE) 8.6-50 MG per tablet Take 1-2 tablets by mouth 2 times daily  Qty: 30 tablet, Refills: 1    Associated Diagnoses: S/P revision of total hip         CONTINUE these medications which have NOT CHANGED    Details   AmLODIPine Besylate (NORVASC PO) Take 5 mg by mouth daily      HYDROCHLOROTHIAZIDE PO Take 25 mg by mouth daily       Losartan Potassium (COZAAR PO) Take 100 mg by mouth daily          STOP taking these medications       ASPIRIN EC PO Comments:   Reason for Stopping:         NAPROXEN PO Comments:   Reason for Stopping:                     Consultations:   No consultations were requested during this admission           Hospital Course:   The patient was admitted after the surical procedure.The patient underwent an uneventful Total hip arthroplasty. Postoperatively, anticoagulation  with Lovenox was started. Home medications have been reconciled. oxycodone 5 mg. was prescribed for pain.             Discharge Instructions and Follow-Up:        Discharge activity: WBAT with a walker   Discharge follow-up: Follow-up with Cirilo Dumont PA-C in ~14 days post-op. [phone]   Outpatient therapy: Should already be arranged by office.  If not, patient should call to schedule 2x/week x 3 weeks.   Home Care agency: None   Supplies and equipment: None        Wound care: Daily dry dressing changes.  May use adaptic/xeroform directly over incision if available.  Staples out 12 days post-op and apply steri-strips.    Other instructions: Posterior hip precautions.  No hip flexion up past 90deg.  No ADduction of the surgical leg across the midline.     Cirilo Dumont PA-C

## 2017-02-03 NOTE — PLAN OF CARE
Problem: Goal Outcome Summary  Goal: Goal Outcome Summary  OT: Orders rec'd, per PT pt with no OT needs and has all AE and DME as needed and no concerns with ADL's, will complete OT orders.

## 2017-02-03 NOTE — PROGRESS NOTES
MD aware of O2 @ HS - per MD pt aware to follow up @ sleep clinic.  MD to reinforce with pt on rounds.

## 2017-02-03 NOTE — PLAN OF CARE
Problem: Goal Outcome Summary  Goal: Goal Outcome Summary  Outcome: Adequate for Discharge Date Met:  02/03/17  Pts pain is well controled with tylenol and ocassional oxycodone. Patient to discharge today when son arrives.

## 2017-02-03 NOTE — PLAN OF CARE
Problem: Goal Outcome Summary  Goal: Goal Outcome Summary  Surgeon Discharge Plan:  Home today after PTx2    Current Functional Status:  Pt on O2 overnight 2/2 sleep apnea, on RA during day O2 sats >95%. Supine to sit IND, sit<>stand IND with axillary crutches. Gait x150' B axillary crutches and SBA, gait x150' B axillary crutches ModIND. Pt ascended/descended 7 stairs 1 rail/1 crutch with supervision. Pt IND with supine LE exs.    Barriers to Plan/Home:  None.

## 2017-02-03 NOTE — PROGRESS NOTES
[facility] Orthopedic Progress Note  Paras Ramos is a 61 year old male    Today's Date:2/3/2017  Admission Date: 2/1/2017  hardware failure right hip   S/P revision of total hip  POD # 2 Revision Right Total Hip Arthroplasty      Patient Seen.  Doing well.  Dressings are clean, dry, and intact.  Circulation, motor and sensory function, intact distally.        PLAN:  Deep venous thrombosis prophylaxis - Lovenox SubQ for 2 weeks  Pain control medication: No changes.  Discharge plan: Home today after PT X 2.      Temp:  [97.5  F (36.4  C)-98.6  F (37  C)] 98.3  F (36.8  C)  Pulse:  [84] 84  Heart Rate:  [63-96] 85  Resp:  [16-18] 16  BP: ()/(64-80) 99/64 mmHg  SpO2:  [91 %-99 %] 93 %      Results for orders placed or performed during the hospital encounter of 02/01/17 (from the past 24 hour(s))   Hemoglobin   Result Value Ref Range    Hemoglobin 13.5 13.3 - 17.7 g/dL         Cirilo Dumont

## 2017-02-04 NOTE — PROGRESS NOTES
VSS on RA, pain well-controlled with tylenol. Discharged home with son. Medications and belongings sent at discharge.

## 2021-02-20 DIAGNOSIS — Z11.59 ENCOUNTER FOR SCREENING FOR OTHER VIRAL DISEASES: ICD-10-CM

## 2021-02-25 ENCOUNTER — AMBULATORY - HEALTHEAST (OUTPATIENT)
Dept: LAB | Facility: CLINIC | Age: 66
End: 2021-02-25

## 2021-02-25 DIAGNOSIS — Z11.59 SCREENING FOR VIRAL DISEASE: ICD-10-CM

## 2021-03-04 ENCOUNTER — AMBULATORY - HEALTHEAST (OUTPATIENT)
Dept: LAB | Facility: CLINIC | Age: 66
End: 2021-03-04

## 2021-03-04 DIAGNOSIS — Z11.59 SCREENING FOR VIRAL DISEASE: ICD-10-CM

## 2021-03-05 LAB
SARS-COV-2 PCR COMMENT: NORMAL
SARS-COV-2 RNA SPEC QL NAA+PROBE: NEGATIVE
SARS-COV-2 VIRUS SPECIMEN SOURCE: NORMAL

## 2021-03-05 RX ORDER — NAPROXEN SODIUM 220 MG
220 TABLET ORAL 2 TIMES DAILY PRN
Status: ON HOLD | COMMUNITY
End: 2023-01-28

## 2021-03-05 RX ORDER — CHLORTHALIDONE 25 MG/1
25 TABLET ORAL DAILY
Status: ON HOLD | COMMUNITY
End: 2021-03-10

## 2021-03-06 ENCOUNTER — COMMUNICATION - HEALTHEAST (OUTPATIENT)
Dept: SCHEDULING | Facility: CLINIC | Age: 66
End: 2021-03-06

## 2021-03-08 ENCOUNTER — APPOINTMENT (OUTPATIENT)
Dept: CT IMAGING | Facility: CLINIC | Age: 66
End: 2021-03-08
Attending: INTERNAL MEDICINE
Payer: COMMERCIAL

## 2021-03-08 ENCOUNTER — ANESTHESIA (OUTPATIENT)
Dept: SURGERY | Facility: CLINIC | Age: 66
End: 2021-03-08
Payer: COMMERCIAL

## 2021-03-08 ENCOUNTER — APPOINTMENT (OUTPATIENT)
Dept: CARDIOLOGY | Facility: CLINIC | Age: 66
End: 2021-03-08
Attending: INTERNAL MEDICINE
Payer: COMMERCIAL

## 2021-03-08 ENCOUNTER — ANESTHESIA EVENT (OUTPATIENT)
Dept: SURGERY | Facility: CLINIC | Age: 66
End: 2021-03-08
Payer: COMMERCIAL

## 2021-03-08 ENCOUNTER — HOSPITAL ENCOUNTER (INPATIENT)
Facility: CLINIC | Age: 66
LOS: 1 days | Discharge: HOME | End: 2021-03-10
Attending: ORTHOPAEDIC SURGERY | Admitting: ORTHOPAEDIC SURGERY
Payer: COMMERCIAL

## 2021-03-08 DIAGNOSIS — Z98.890 S/P HARDWARE REMOVAL: Primary | ICD-10-CM

## 2021-03-08 DIAGNOSIS — I20.0 UNSTABLE ANGINA (H): ICD-10-CM

## 2021-03-08 DIAGNOSIS — I10 BENIGN ESSENTIAL HYPERTENSION: ICD-10-CM

## 2021-03-08 DIAGNOSIS — I25.110 CORONARY ARTERY DISEASE INVOLVING NATIVE CORONARY ARTERY OF NATIVE HEART WITH UNSTABLE ANGINA PECTORIS (H): ICD-10-CM

## 2021-03-08 LAB
ALBUMIN SERPL-MCNC: 3.2 G/DL (ref 3.4–5)
ALP SERPL-CCNC: 72 U/L (ref 40–150)
ALT SERPL W P-5'-P-CCNC: 30 U/L (ref 0–70)
ANION GAP SERPL CALCULATED.3IONS-SCNC: 6 MMOL/L (ref 3–14)
AST SERPL W P-5'-P-CCNC: 22 U/L (ref 0–45)
BASOPHILS # BLD AUTO: 0 10E9/L (ref 0–0.2)
BASOPHILS NFR BLD AUTO: 0.3 %
BILIRUB SERPL-MCNC: 0.6 MG/DL (ref 0.2–1.3)
BUN SERPL-MCNC: 17 MG/DL (ref 7–30)
CALCIUM SERPL-MCNC: 8.4 MG/DL (ref 8.5–10.1)
CHLORIDE SERPL-SCNC: 103 MMOL/L (ref 94–109)
CO2 SERPL-SCNC: 28 MMOL/L (ref 20–32)
CREAT SERPL-MCNC: 0.98 MG/DL (ref 0.66–1.25)
D DIMER PPP FEU-MCNC: 0.6 UG/ML FEU (ref 0–0.5)
DIFFERENTIAL METHOD BLD: NORMAL
EOSINOPHIL # BLD AUTO: 0.1 10E9/L (ref 0–0.7)
EOSINOPHIL NFR BLD AUTO: 1.4 %
ERYTHROCYTE [DISTWIDTH] IN BLOOD BY AUTOMATED COUNT: 13 % (ref 10–15)
GFR SERPL CREATININE-BSD FRML MDRD: 79 ML/MIN/{1.73_M2}
GLUCOSE SERPL-MCNC: 165 MG/DL (ref 70–99)
HBA1C MFR BLD: 7 % (ref 0–5.6)
HCT VFR BLD AUTO: 45.4 % (ref 40–53)
HGB BLD-MCNC: 14.3 G/DL (ref 13.3–17.7)
IMM GRANULOCYTES # BLD: 0 10E9/L (ref 0–0.4)
IMM GRANULOCYTES NFR BLD: 0.2 %
LYMPHOCYTES # BLD AUTO: 1.7 10E9/L (ref 0.8–5.3)
LYMPHOCYTES NFR BLD AUTO: 26.4 %
MCH RBC QN AUTO: 29.4 PG (ref 26.5–33)
MCHC RBC AUTO-ENTMCNC: 31.5 G/DL (ref 31.5–36.5)
MCV RBC AUTO: 93 FL (ref 78–100)
MONOCYTES # BLD AUTO: 0.4 10E9/L (ref 0–1.3)
MONOCYTES NFR BLD AUTO: 6.4 %
NEUTROPHILS # BLD AUTO: 4.2 10E9/L (ref 1.6–8.3)
NEUTROPHILS NFR BLD AUTO: 65.3 %
NRBC # BLD AUTO: 0 10*3/UL
NRBC BLD AUTO-RTO: 0 /100
PLATELET # BLD AUTO: 223 10E9/L (ref 150–450)
POTASSIUM SERPL-SCNC: 3.6 MMOL/L (ref 3.4–5.3)
PROT SERPL-MCNC: 6.5 G/DL (ref 6.8–8.8)
RBC # BLD AUTO: 4.86 10E12/L (ref 4.4–5.9)
SODIUM SERPL-SCNC: 137 MMOL/L (ref 133–144)
TROPONIN I SERPL-MCNC: <0.015 UG/L (ref 0–0.04)
TROPONIN I SERPL-MCNC: <0.015 UG/L (ref 0–0.04)
WBC # BLD AUTO: 6.4 10E9/L (ref 4–11)

## 2021-03-08 PROCEDURE — 99222 1ST HOSP IP/OBS MODERATE 55: CPT | Mod: 25 | Performed by: INTERNAL MEDICINE

## 2021-03-08 PROCEDURE — 250N000025 HC SEVOFLURANE, PER MIN: Performed by: ORTHOPAEDIC SURGERY

## 2021-03-08 PROCEDURE — 250N000011 HC RX IP 250 OP 636: Performed by: ORTHOPAEDIC SURGERY

## 2021-03-08 PROCEDURE — 370N000017 HC ANESTHESIA TECHNICAL FEE, PER MIN: Performed by: ORTHOPAEDIC SURGERY

## 2021-03-08 PROCEDURE — 360N000082 HC SURGERY LEVEL 2 W/ FLUORO, PER MIN: Performed by: ORTHOPAEDIC SURGERY

## 2021-03-08 PROCEDURE — 36415 COLL VENOUS BLD VENIPUNCTURE: CPT | Performed by: PHYSICIAN ASSISTANT

## 2021-03-08 PROCEDURE — 258N000003 HC RX IP 258 OP 636: Performed by: ANESTHESIOLOGY

## 2021-03-08 PROCEDURE — 84484 ASSAY OF TROPONIN QUANT: CPT | Performed by: PHYSICIAN ASSISTANT

## 2021-03-08 PROCEDURE — 999N000208 ECHOCARDIOGRAM LIMITED

## 2021-03-08 PROCEDURE — 80053 COMPREHEN METABOLIC PANEL: CPT | Performed by: PHYSICIAN ASSISTANT

## 2021-03-08 PROCEDURE — 93321 DOPPLER ECHO F-UP/LMTD STD: CPT | Mod: 26 | Performed by: INTERNAL MEDICINE

## 2021-03-08 PROCEDURE — 250N000011 HC RX IP 250 OP 636: Performed by: ANESTHESIOLOGY

## 2021-03-08 PROCEDURE — 71275 CT ANGIOGRAPHY CHEST: CPT

## 2021-03-08 PROCEDURE — 93005 ELECTROCARDIOGRAM TRACING: CPT

## 2021-03-08 PROCEDURE — 710N000009 HC RECOVERY PHASE 1, LEVEL 1, PER MIN: Performed by: ORTHOPAEDIC SURGERY

## 2021-03-08 PROCEDURE — 272N000001 HC OR GENERAL SUPPLY STERILE: Performed by: ORTHOPAEDIC SURGERY

## 2021-03-08 PROCEDURE — 85379 FIBRIN DEGRADATION QUANT: CPT | Performed by: INTERNAL MEDICINE

## 2021-03-08 PROCEDURE — 255N000002 HC RX 255 OP 636: Performed by: ORTHOPAEDIC SURGERY

## 2021-03-08 PROCEDURE — 0SPF34Z REMOVAL OF INTERNAL FIXATION DEVICE FROM RIGHT ANKLE JOINT, PERCUTANEOUS APPROACH: ICD-10-PCS | Performed by: ORTHOPAEDIC SURGERY

## 2021-03-08 PROCEDURE — 250N000009 HC RX 250: Performed by: NURSE ANESTHETIST, CERTIFIED REGISTERED

## 2021-03-08 PROCEDURE — 258N000003 HC RX IP 258 OP 636: Performed by: NURSE ANESTHETIST, CERTIFIED REGISTERED

## 2021-03-08 PROCEDURE — 99205 OFFICE O/P NEW HI 60 MIN: CPT | Performed by: PHYSICIAN ASSISTANT

## 2021-03-08 PROCEDURE — 250N000011 HC RX IP 250 OP 636: Performed by: NURSE ANESTHETIST, CERTIFIED REGISTERED

## 2021-03-08 PROCEDURE — 85025 COMPLETE CBC W/AUTO DIFF WBC: CPT | Performed by: PHYSICIAN ASSISTANT

## 2021-03-08 PROCEDURE — 93325 DOPPLER ECHO COLOR FLOW MAPG: CPT | Mod: 26 | Performed by: INTERNAL MEDICINE

## 2021-03-08 PROCEDURE — 271N000001 HC OR GENERAL SUPPLY NON-STERILE: Performed by: ORTHOPAEDIC SURGERY

## 2021-03-08 PROCEDURE — 250N000009 HC RX 250: Performed by: ORTHOPAEDIC SURGERY

## 2021-03-08 PROCEDURE — 84484 ASSAY OF TROPONIN QUANT: CPT | Performed by: ANESTHESIOLOGY

## 2021-03-08 PROCEDURE — 250N000011 HC RX IP 250 OP 636: Performed by: PHYSICIAN ASSISTANT

## 2021-03-08 PROCEDURE — 83036 HEMOGLOBIN GLYCOSYLATED A1C: CPT | Performed by: PHYSICIAN ASSISTANT

## 2021-03-08 PROCEDURE — 999N000157 HC STATISTIC RCP TIME EA 10 MIN

## 2021-03-08 PROCEDURE — 93010 ELECTROCARDIOGRAM REPORT: CPT | Performed by: INTERNAL MEDICINE

## 2021-03-08 PROCEDURE — 93308 TTE F-UP OR LMTD: CPT

## 2021-03-08 PROCEDURE — 999N000141 HC STATISTIC PRE-PROCEDURE NURSING ASSESSMENT: Performed by: ORTHOPAEDIC SURGERY

## 2021-03-08 PROCEDURE — 93308 TTE F-UP OR LMTD: CPT | Mod: 26 | Performed by: INTERNAL MEDICINE

## 2021-03-08 PROCEDURE — 94660 CPAP INITIATION&MGMT: CPT

## 2021-03-08 RX ORDER — NICOTINE POLACRILEX 4 MG
15-30 LOZENGE BUCCAL
Status: DISCONTINUED | OUTPATIENT
Start: 2021-03-08 | End: 2021-03-10 | Stop reason: HOSPADM

## 2021-03-08 RX ORDER — HYDROMORPHONE HYDROCHLORIDE 1 MG/ML
0.2 INJECTION, SOLUTION INTRAMUSCULAR; INTRAVENOUS; SUBCUTANEOUS
Status: CANCELLED | OUTPATIENT
Start: 2021-03-08

## 2021-03-08 RX ORDER — FENTANYL CITRATE 50 UG/ML
25-50 INJECTION, SOLUTION INTRAMUSCULAR; INTRAVENOUS
Status: DISCONTINUED | OUTPATIENT
Start: 2021-03-08 | End: 2021-03-08 | Stop reason: HOSPADM

## 2021-03-08 RX ORDER — ONDANSETRON 4 MG/1
4-8 TABLET, ORALLY DISINTEGRATING ORAL EVERY 8 HOURS PRN
Qty: 4 TABLET | Refills: 0 | Status: SHIPPED | OUTPATIENT
Start: 2021-03-08 | End: 2023-01-10

## 2021-03-08 RX ORDER — HYDROCODONE BITARTRATE AND ACETAMINOPHEN 5; 325 MG/1; MG/1
1-2 TABLET ORAL EVERY 6 HOURS PRN
Status: CANCELLED | OUTPATIENT
Start: 2021-03-08

## 2021-03-08 RX ORDER — CEFAZOLIN SODIUM IN 0.9 % NACL 3 G/100 ML
3 INTRAVENOUS SOLUTION, PIGGYBACK (ML) INTRAVENOUS
Status: COMPLETED | OUTPATIENT
Start: 2021-03-08 | End: 2021-03-08

## 2021-03-08 RX ORDER — SODIUM CHLORIDE, SODIUM LACTATE, POTASSIUM CHLORIDE, CALCIUM CHLORIDE 600; 310; 30; 20 MG/100ML; MG/100ML; MG/100ML; MG/100ML
INJECTION, SOLUTION INTRAVENOUS CONTINUOUS
Status: CANCELLED | OUTPATIENT
Start: 2021-03-08

## 2021-03-08 RX ORDER — NALOXONE HYDROCHLORIDE 0.4 MG/ML
0.4 INJECTION, SOLUTION INTRAMUSCULAR; INTRAVENOUS; SUBCUTANEOUS
Status: DISCONTINUED | OUTPATIENT
Start: 2021-03-08 | End: 2021-03-08

## 2021-03-08 RX ORDER — CHLORTHALIDONE 25 MG/1
25 TABLET ORAL DAILY
Status: DISCONTINUED | OUTPATIENT
Start: 2021-03-09 | End: 2021-03-09

## 2021-03-08 RX ORDER — ROPIVACAINE HYDROCHLORIDE 5 MG/ML
INJECTION, SOLUTION EPIDURAL; INFILTRATION; PERINEURAL PRN
Status: DISCONTINUED | OUTPATIENT
Start: 2021-03-08 | End: 2021-03-08 | Stop reason: HOSPADM

## 2021-03-08 RX ORDER — NALOXONE HYDROCHLORIDE 0.4 MG/ML
0.2 INJECTION, SOLUTION INTRAMUSCULAR; INTRAVENOUS; SUBCUTANEOUS
Status: DISCONTINUED | OUTPATIENT
Start: 2021-03-08 | End: 2021-03-08

## 2021-03-08 RX ORDER — AMLODIPINE BESYLATE 5 MG/1
5 TABLET ORAL DAILY
Status: DISCONTINUED | OUTPATIENT
Start: 2021-03-09 | End: 2021-03-10 | Stop reason: HOSPADM

## 2021-03-08 RX ORDER — PROPOFOL 10 MG/ML
INJECTION, EMULSION INTRAVENOUS PRN
Status: DISCONTINUED | OUTPATIENT
Start: 2021-03-08 | End: 2021-03-08

## 2021-03-08 RX ORDER — FENTANYL CITRATE 50 UG/ML
INJECTION, SOLUTION INTRAMUSCULAR; INTRAVENOUS PRN
Status: DISCONTINUED | OUTPATIENT
Start: 2021-03-08 | End: 2021-03-08

## 2021-03-08 RX ORDER — HYDROMORPHONE HYDROCHLORIDE 1 MG/ML
.3-.5 INJECTION, SOLUTION INTRAMUSCULAR; INTRAVENOUS; SUBCUTANEOUS EVERY 5 MIN PRN
Status: DISCONTINUED | OUTPATIENT
Start: 2021-03-08 | End: 2021-03-08 | Stop reason: HOSPADM

## 2021-03-08 RX ORDER — SODIUM CHLORIDE, SODIUM LACTATE, POTASSIUM CHLORIDE, CALCIUM CHLORIDE 600; 310; 30; 20 MG/100ML; MG/100ML; MG/100ML; MG/100ML
INJECTION, SOLUTION INTRAVENOUS CONTINUOUS PRN
Status: DISCONTINUED | OUTPATIENT
Start: 2021-03-08 | End: 2021-03-08

## 2021-03-08 RX ORDER — CEFAZOLIN SODIUM 1 G/3ML
1 INJECTION, POWDER, FOR SOLUTION INTRAMUSCULAR; INTRAVENOUS SEE ADMIN INSTRUCTIONS
Status: DISCONTINUED | OUTPATIENT
Start: 2021-03-08 | End: 2021-03-08 | Stop reason: HOSPADM

## 2021-03-08 RX ORDER — LOSARTAN POTASSIUM 100 MG/1
100 TABLET ORAL DAILY
Status: DISCONTINUED | OUTPATIENT
Start: 2021-03-09 | End: 2021-03-10 | Stop reason: HOSPADM

## 2021-03-08 RX ORDER — ONDANSETRON 4 MG/1
4 TABLET, ORALLY DISINTEGRATING ORAL EVERY 30 MIN PRN
Status: DISCONTINUED | OUTPATIENT
Start: 2021-03-08 | End: 2021-03-08 | Stop reason: HOSPADM

## 2021-03-08 RX ORDER — SODIUM CHLORIDE, SODIUM LACTATE, POTASSIUM CHLORIDE, CALCIUM CHLORIDE 600; 310; 30; 20 MG/100ML; MG/100ML; MG/100ML; MG/100ML
INJECTION, SOLUTION INTRAVENOUS CONTINUOUS
Status: DISCONTINUED | OUTPATIENT
Start: 2021-03-08 | End: 2021-03-08 | Stop reason: HOSPADM

## 2021-03-08 RX ORDER — IOPAMIDOL 755 MG/ML
83 INJECTION, SOLUTION INTRAVASCULAR ONCE
Status: DISCONTINUED | OUTPATIENT
Start: 2021-03-08 | End: 2021-03-08

## 2021-03-08 RX ORDER — LIDOCAINE HYDROCHLORIDE 20 MG/ML
INJECTION, SOLUTION INFILTRATION; PERINEURAL PRN
Status: DISCONTINUED | OUTPATIENT
Start: 2021-03-08 | End: 2021-03-08

## 2021-03-08 RX ORDER — HYDROCODONE BITARTRATE AND ACETAMINOPHEN 5; 325 MG/1; MG/1
1 TABLET ORAL
Status: COMPLETED | OUTPATIENT
Start: 2021-03-08 | End: 2021-03-09

## 2021-03-08 RX ORDER — HYDROCODONE BITARTRATE AND ACETAMINOPHEN 5; 325 MG/1; MG/1
1-2 TABLET ORAL EVERY 4 HOURS PRN
Qty: 10 TABLET | Refills: 0 | Status: SHIPPED | OUTPATIENT
Start: 2021-03-08

## 2021-03-08 RX ORDER — ONDANSETRON 2 MG/ML
4 INJECTION INTRAMUSCULAR; INTRAVENOUS EVERY 6 HOURS PRN
Status: CANCELLED | OUTPATIENT
Start: 2021-03-08

## 2021-03-08 RX ORDER — ONDANSETRON 4 MG/1
4 TABLET, ORALLY DISINTEGRATING ORAL
Status: DISCONTINUED | OUTPATIENT
Start: 2021-03-08 | End: 2021-03-09

## 2021-03-08 RX ORDER — DEXTROSE MONOHYDRATE 25 G/50ML
25-50 INJECTION, SOLUTION INTRAVENOUS
Status: DISCONTINUED | OUTPATIENT
Start: 2021-03-08 | End: 2021-03-10 | Stop reason: HOSPADM

## 2021-03-08 RX ORDER — ONDANSETRON 2 MG/ML
4 INJECTION INTRAMUSCULAR; INTRAVENOUS EVERY 30 MIN PRN
Status: DISCONTINUED | OUTPATIENT
Start: 2021-03-08 | End: 2021-03-08 | Stop reason: HOSPADM

## 2021-03-08 RX ORDER — ONDANSETRON 4 MG/1
4 TABLET, ORALLY DISINTEGRATING ORAL EVERY 6 HOURS PRN
Status: CANCELLED | OUTPATIENT
Start: 2021-03-08

## 2021-03-08 RX ORDER — KETOROLAC TROMETHAMINE 30 MG/ML
30 INJECTION, SOLUTION INTRAMUSCULAR; INTRAVENOUS EVERY 6 HOURS PRN
Status: DISCONTINUED | OUTPATIENT
Start: 2021-03-08 | End: 2021-03-08

## 2021-03-08 RX ORDER — LIDOCAINE 40 MG/G
CREAM TOPICAL
Status: CANCELLED | OUTPATIENT
Start: 2021-03-08

## 2021-03-08 RX ADMIN — IOPAMIDOL 83 ML: 755 INJECTION, SOLUTION INTRAVENOUS at 17:43

## 2021-03-08 RX ADMIN — SODIUM CHLORIDE, POTASSIUM CHLORIDE, SODIUM LACTATE AND CALCIUM CHLORIDE: 600; 310; 30; 20 INJECTION, SOLUTION INTRAVENOUS at 12:53

## 2021-03-08 RX ADMIN — SODIUM CHLORIDE 100 ML: 9 INJECTION, SOLUTION INTRAVENOUS at 17:43

## 2021-03-08 RX ADMIN — KETOROLAC TROMETHAMINE 30 MG: 30 INJECTION, SOLUTION INTRAMUSCULAR at 15:21

## 2021-03-08 RX ADMIN — Medication 1 EACH: at 12:58

## 2021-03-08 RX ADMIN — FENTANYL CITRATE 50 MCG: 50 INJECTION, SOLUTION INTRAMUSCULAR; INTRAVENOUS at 12:58

## 2021-03-08 RX ADMIN — HUMAN ALBUMIN MICROSPHERES AND PERFLUTREN 9 ML: 10; .22 INJECTION, SOLUTION INTRAVENOUS at 16:26

## 2021-03-08 RX ADMIN — PROPOFOL 200 MG: 10 INJECTION, EMULSION INTRAVENOUS at 12:58

## 2021-03-08 RX ADMIN — SODIUM CHLORIDE, POTASSIUM CHLORIDE, SODIUM LACTATE AND CALCIUM CHLORIDE: 600; 310; 30; 20 INJECTION, SOLUTION INTRAVENOUS at 16:32

## 2021-03-08 RX ADMIN — LIDOCAINE HYDROCHLORIDE 100 MG: 20 INJECTION, SOLUTION INFILTRATION; PERINEURAL at 12:58

## 2021-03-08 RX ADMIN — MIDAZOLAM 2 MG: 1 INJECTION INTRAMUSCULAR; INTRAVENOUS at 12:53

## 2021-03-08 RX ADMIN — Medication 3 G: at 12:58

## 2021-03-08 RX ADMIN — FENTANYL CITRATE 50 MCG: 0.05 INJECTION, SOLUTION INTRAMUSCULAR; INTRAVENOUS at 14:46

## 2021-03-08 ASSESSMENT — LIFESTYLE VARIABLES: TOBACCO_USE: 0

## 2021-03-08 ASSESSMENT — MIFFLIN-ST. JEOR: SCORE: 2533.45

## 2021-03-08 NOTE — OR NURSING
Cardiology at bedside and JEAN Miguel.  Echo done.  Patient to be admitted.  Pain is decreased, otherwise in stable condition.  Report off to Darling PIERRE.

## 2021-03-08 NOTE — BRIEF OP NOTE
[facility]    Brief Operative Note    Pre-operative diagnosis: Painful orthopaedic hardware (H) [T84.84XA]  Post-operative diagnosis R foot retained hardware    Procedure: Procedure(s):  RIGHT ANKLE STAPLE REMOVAL  Surgeon: Surgeon(s) and Role:     * Venkata Mccallum MD - Primary     * Juan Reddy PA-C - Assisting  Anesthesia: General   Estimated blood loss: Less than 10 ml  Drains: None  Specimens: * No specimens in log *  Findings:   Expected.  Complications: None.  Implants:   Implant Name Type Inv. Item Serial No.  Lot No. LRB No. Used Action   IMP STPL FIXATION YOON SNR LG W/SPIKES  IMP STPL FIXATION YOON SNR LG W/SPIKES [id number]   2/3 24 SEPT 2013 Right 1 Explanted

## 2021-03-08 NOTE — CONSULTS
Cardiology Consultation      Paras Ramos MRN# [id number]   YOB: 1955 Age: 65 year old   Date of Admission: 3/8/2021     Reason for consult: Post operative chest discomfort           Assessment and Plan:     65-year-old gentleman with no significant cardiac history, well-controlled hypertension, underwent loose staple removal today.  Postprocedure he started experiencing substernal chest pressure with radiation to the back.  Cardiology were consulted for further evaluation.        1. Chest discomfort in the postoperative setting with radiation to the back  2. Hypertension  3. History of right ankle repair with removal of a loose staple during surgery today.    PLAN  -The patient's chest discomfort had essentially resolved at the time of my evaluation.  His EKG is unchanged compared to the preoperative tracing.  - Nevertheless, I think it is reasonable to obtain a limited echocardiogram to assess for regional wall motion normalities and to check serial troponins and a D-dimer.  Depending on the results, further evaluation may be indicated.  -I have discussed the possibility of coronary angiography with surgical staff.  There is no contraindication to dual antiplatelet therapy.  -Admission overnight to the CICU/[facility] would be recommended.             Chief Complaint:   No chief complaint on file.           History of Present Illness:   This patient is a 65 year old male with no known cardiac history who has well-controlled hypertension and obstructive sleep apnea for which he is fully compliant with CPAP therapy.  He also has a history of right ankle repair and recently underwent preoperative evaluation for removal of a loose staple.  At that time he was thought to be low risk given his lack of serious medical issues and no active cardiac symptoms.    He underwent surgery this afternoon and the surgery itself lasted a few minutes.  Postoperatively, however, he started experiencing substernal chest  "pressure with radiation to the back.  He has never experienced similar symptoms in the past.  He received IV Toradol with relief of his symptoms.    At the time I spoke to him he was chest pain-free.         Physical Exam:   Vitals were reviewed  Blood pressure 107/74, pulse 69, temperature 97.6  F (36.4  C), temperature source Temporal, resp. rate 8, height 1.88 m (6' 2\"), weight (!) 168.4 kg (371 lb 3 oz), SpO2 96 %.  Temperatures:  Current - Temp: 97.6  F (36.4  C); Max - Temp  Av.6  F (36.4  C)  Min: 97.5  F (36.4  C)  Max: 97.6  F (36.4  C)  Respiration range: Resp  Av.8  Min: 8  Max: 24  Pulse range: Pulse  Av.8  Min: 69  Max: 86  Blood pressure range: Systolic (24hrs), Av , Min:95 , Max:135   ; Diastolic (24hrs), Av, Min:61, Max:94    Pulse oximetry range: SpO2  Av.1 %  Min: 92 %  Max: 98 %    Intake/Output Summary (Last 24 hours) at 3/8/2021 1604  Last data filed at 3/8/2021 1316  Gross per 24 hour   Intake 200 ml   Output --   Net 200 ml     Constitutional:   awake, alert, cooperative, no apparent distress, and appears stated age     Eyes:   Lids and lashes normal, pupils equal, round and reactive to light, extra ocular muscles intact, sclera clear, conjunctiva normal     Neck:   supple, symmetrical, trachea midline, no JVD     Back:   symmetric     Lungs:   No increased work of breathing, good air exchange, clear to auscultation bilaterally, no crackles or wheezing  clear to auscultation     Cardiovascular:   Normal apical impulse, regular rate and rhythm, normal S1 and S2, no S3 or S4, and no murmur noted.        Abdomen:   non-tender     Musculoskeletal:   motor strength is 5 out of 5 all extremities bilaterally     Neurologic:   Grossly nonfocal     Skin:   no bruising or bleeding     Additional findings:   Edema 1+                 Past Medical History:   I have reviewed this patient's past medical history  Past Medical History:   Diagnosis Date     Arthritis     OA of ankle " and hip     Colon polyp      ED (erectile dysfunction)      Gastro-oesophageal reflux disease      Hip dislocation, right (H)      Hypertension      OA (osteoarthritis) of ankle      Osteoarthritis of hip      Sleep apnea     no cpap     Testicular hypofunction              Past Surgical History:   I have reviewed this patient's past surgical history  Past Surgical History:   Procedure Laterality Date     ARTHRODESIS ANKLE  12/9/2013    Procedure: ARTHRODESIS ANKLE;;  Surgeon: Venkata Mccallum MD;  Location: [facility]     ARTHROPLASTY ANKLE  12/9/2013    Procedure: ARTHROPLASTY ANKLE;  RIGHT TOTAL ANKLE ARTHROPLASTY, SUBTALAR FUSION, LIGAMENT REPAIR (Tornier VALENCIA)^;  Surgeon: Venkata Mccallum MD;  Location: [facility]     ARTHROPLASTY HIP  7/23/2014    Procedure: ARTHROPLASTY HIP;  Surgeon: Cirilo Nassar MD;  Location:  OR     ARTHROPLASTY REVISION HIP Right 11/14/2014    Procedure: ARTHROPLASTY REVISION HIP;  Surgeon: Cirilo Nassar MD;  Location:  OR     ARTHROPLASTY REVISION HIP Right 11/9/2015    Procedure: ARTHROPLASTY REVISION HIP;  Surgeon: José Estrella MD;  Location:  OR     ARTHROPLASTY REVISION HIP Right 2/1/2017    Procedure: ARTHROPLASTY REVISION HIP;  Surgeon: Cirilo Nassar MD;  Location:  OR     CHOLECYSTECTOMY       COLONOSCOPY       REPAIR LIGAMENT ANKLE  12/9/2013    Procedure: REPAIR LIGAMENT ANKLE;;  Surgeon: Venkata Mccallum MD;  Location: [facility]               Social History:   I have reviewed this patient's social history  Social History     Tobacco Use     Smoking status: Never Smoker     Smokeless tobacco: Never Used   Substance Use Topics     Alcohol use: Yes     Comment: 1 can beer a week             Family History:   I have reviewed this patient's family history  History reviewed. No pertinent family history.          Allergies:   No Known Allergies          Medications:   I have reviewed this patient's current medications  Medications Prior to Admission    Medication Sig Dispense Refill Last Dose     amLODIPine (NORVASC) 5 MG tablet Take 5 mg by mouth daily    3/8/2021 at 0800     chlorthalidone (HYGROTON) 25 MG tablet Take 25 mg by mouth daily   3/8/2021 at 0800     losartan (COZAAR) 100 MG tablet Take 100 mg by mouth daily    3/8/2021 at 0800     naproxen sodium (ANAPROX) 220 MG tablet Take 220 mg by mouth 2 times daily as needed for moderate pain   Unknown at Unknown time     Current Facility-Administered Medications Ordered in Epic   Medication Dose Route Frequency Last Rate Last Admin     fentaNYL (PF) (SUBLIMAZE) injection 25-50 mcg  25-50 mcg Intravenous Q2 Min PRN   50 mcg at 03/08/21 1446     HYDROcodone-acetaminophen (NORCO) 5-325 MG per tablet 1 tablet  1 tablet Oral Once PRN         HYDROmorphone (PF) (DILAUDID) injection 0.3-0.5 mg  0.3-0.5 mg Intravenous Q5 Min PRN         ketorolac (TORADOL) injection 30 mg  30 mg Intravenous Q6H PRN   30 mg at 03/08/21 1521     lactated ringers infusion   Intravenous Continuous         naloxone (NARCAN) injection 0.2 mg  0.2 mg Intravenous Q2 Min PRN         naloxone (NARCAN) injection 0.2 mg  0.2 mg Intramuscular Q2 Min PRN         naloxone (NARCAN) injection 0.4 mg  0.4 mg Intravenous Q2 Min PRN         naloxone (NARCAN) injection 0.4 mg  0.4 mg Intramuscular Q2 Min PRN         ondansetron (ZOFRAN-ODT) ODT tab 4 mg  4 mg Oral Q30 Min PRN        Or     ondansetron (ZOFRAN) injection 4 mg  4 mg Intravenous Q30 Min PRN         ondansetron (ZOFRAN-ODT) ODT tab 4 mg  4 mg Oral Once PRN         prochlorperazine (COMPAZINE) injection 5 mg  5 mg Intravenous Q6H PRN         ropivacaine (NAROPIN) injection    PRN   7 mL at 03/08/21 1309     Current Outpatient Medications Ordered in Epic   Medication     HYDROcodone-acetaminophen (NORCO) 5-325 MG tablet     ondansetron (ZOFRAN-ODT) 4 MG ODT tab             Review of Systems:   The 10 point Review of Systems is negative other than noted in the HPI            Data:   All  laboratory data reviewed  Results for orders placed or performed during the hospital encounter of 03/08/21 (from the past 24 hour(s))   EKG 12-lead, tracing only   Result Value Ref Range    Interpretation ECG Click View Image link to view waveform and result      EKG results: NSR with no acute ST-T wave abnormalities.

## 2021-03-08 NOTE — Clinical Note
Stent deployed in the middle left anterior descending. Max pressure = 8 shabbir. Total duration = 15 seconds. Balloon reinflated a second time: Max pressure = 12 shabbir. Total duration = 8 seconds.

## 2021-03-08 NOTE — ANESTHESIA CARE TRANSFER NOTE
Patient: Paras Ramos    Procedure(s):  RIGHT ANKLE STAPLE REMOVAL    Diagnosis: Painful orthopaedic hardware (H) [T84.84XA]  Diagnosis Additional Information: No value filed.    Anesthesia Type:   General     Note:    Oropharynx: oropharynx clear of all foreign objects  Level of Consciousness: awake and drowsy  Oxygen Supplementation: face mask  Level of Supplemental Oxygen (L/min / FiO2): 8  Independent Airway: airway patency satisfactory and stable  Dentition: dentition unchanged  Vital Signs Stable: post-procedure vital signs reviewed and stable  Report to RN Given: handoff report given  Patient transferred to: PACU    Handoff Report: Identifed the Patient, Identified the Reponsible Provider, Reviewed the pertinent medical history, Discussed the surgical course, Reviewed Intra-OP anesthesia mangement and issues during anesthesia, Set expectations for post-procedure period and Allowed opportunity for questions and acknowledgement of understanding      Vitals: (Last set prior to Anesthesia Care Transfer)  CRNA VITALS  3/8/2021 1246 - 3/8/2021 1320      3/8/2021             NIBP:  101/52    Pulse:  85    NIBP Mean:  79    SpO2:  93 %    Resp Rate (set):  10        Electronically Signed By: BRODY Mata CRNA  March 8, 2021  1:20 PM

## 2021-03-08 NOTE — Clinical Note
Stent deployed in the middle left anterior descending. Max pressure = 8 shabbir. Total duration = 20 seconds. Balloon reinflated a second time: Max pressure = 8 shabbir. Total duration = 8 seconds. Balloon reinflated a third time: Max pressure = 8 shabbir. Total duration = 15 seconds.

## 2021-03-08 NOTE — Clinical Note
The first balloon was inserted into the left anterior descending and middle left anterior descending.Max pressure = 12 shabbir. Total duration = 12 seconds.     Max pressure = 12 shabbir. Total duration = 10 seconds.    Balloon reinflated a second time: Max pressure = 12 shabbir. Total duration = 10 seconds.  Balloon reinflated a third time: Max pressure = 12 shabbir. Total duration = 8 seconds.  Balloon reinflated a fourth time: Max pressure = 12 shabbir. Total duration = 8 seconds.

## 2021-03-08 NOTE — Clinical Note
The first balloon was inserted into the left anterior descending and middle left anterior descending.Max pressure = 12 shabbir. Total duration = 12 seconds.     Max pressure = 20 shabbir. Total duration = 6 seconds.    Balloon reinflated a second time: Max pressure = 20 shabbir. Total duration = 6 seconds.  Balloon reinflated a third time: Max pressure = 20 shabbir. Total duration = 6 seconds.

## 2021-03-08 NOTE — Clinical Note
The first balloon was inserted into the left anterior descending.Max pressure = 12 shabbir. Total duration = 8 seconds.     Max pressure = 18 shabbir. Total duration = 10 seconds.    Balloon reinflated a second time: Max pressure = 18 shabbir. Total duration = 10 seconds.  Balloon reinflated a third time: Max pressure = 20 shabbir. Total duration = 10 seconds.

## 2021-03-08 NOTE — ANESTHESIA POSTPROCEDURE EVALUATION
Patient: Paras Ramos    Procedure(s):  RIGHT ANKLE STAPLE REMOVAL    Diagnosis:Painful orthopaedic hardware (H) [T84.84XA]  Diagnosis Additional Information: No value filed.    Anesthesia Type:  General    Note:     Postop Pain Control: Uneventful            Sign Out: Well controlled pain   PONV: No   Neuro/Psych: Uneventful            Sign Out: Acceptable/Baseline neuro status   Airway/Respiratory: Uneventful            Sign Out: Acceptable/Baseline resp. status   CV/Hemodynamics: Uneventful            Sign Out: Acceptable CV status   Other NRE: NONE   DID A NON-ROUTINE EVENT OCCUR? YES    Event details/Postop Comments:  Patient complaining of chest pain postoperatively, localizing to the left and wrapping to the back         Last vitals:  Vitals:    03/08/21 1400 03/08/21 1415 03/08/21 1430   BP: 115/76 113/75 96/63   Pulse: 79 81 75   Resp: 20 20 17   Temp:      SpO2: 98% 98% 94%       Last vitals prior to Anesthesia Care Transfer:  CRNA VITALS  3/8/2021 1246 - 3/8/2021 1346      3/8/2021             NIBP:  101/52    Pulse:  85    NIBP Mean:  79    SpO2:  93 %    Resp Rate (set):  10          Electronically Signed By: Leon Miguel MD  March 8, 2021  2:36 PM

## 2021-03-08 NOTE — ANESTHESIA PROCEDURE NOTES
Airway   Date/Time: 3/8/2021 1:00 PM   Patient location during procedure: OR  Staff -   Anesthesiologist:  Leon Miguel MD  CRNA: Alicia Rosas APRN CRNA  Performed By: CRNA    Consent for Airway   Urgency: elective    Indications and Patient Condition  Indications for airway management: jakob-procedural      Final Airway Details  Final airway type: supraglottic airway        Post intubation assessment   Placement verified by: capnometry, equal breath sounds and chest rise   Number of attempts at approach: 1  Number of other approaches attempted: 0  Ease of procedure: easy  Dentition: Intact and Unchanged

## 2021-03-08 NOTE — OR NURSING
Patient awake and stated having chest pain on left side rating it a 5/10 encouraged coughing and deep breathing. Patient bp and vss stable, occassional pvcs

## 2021-03-08 NOTE — Clinical Note
The first balloon was inserted into the left anterior descending and middle left anterior descending.Max pressure = 12 shabbir. Total duration = 12 seconds.     Max pressure = 20 shabbir. Total duration = 12 seconds.    Balloon reinflated a second time: Max pressure = 20 shabbir. Total duration = 12 seconds.  Balloon reinflated a third time: Max pressure = 22 shabbir. Total duration = 12 seconds.  Balloon reinflated a fourth time: Max pressure = 22 shabbir. Total duration = 10 seconds.

## 2021-03-08 NOTE — OR NURSING
Cardiology at bedside, patient continue to have pain left chest 3/10 better than previous. Plan is to keep inhouse, to heart center, labs and echo ordered. Call put out to [facility] for admission orders. Continue to monitor patient.

## 2021-03-08 NOTE — LETTER
[facility]  [address]  [place] [address] [phone]          March 10, 2021    RE:  Paras Ramos                                                                                                                                                       [address] [facility] [address]            To whom it may concern:    Paras Ramos was under my professional care for his heart attack. He is not able to lift/carry/push/pull more than 10 lbs for one week. He is able to return to work on 3/15/21, but is restricted to NO MORE than 40 hours per week until he follows up with cardiology. He should be limited to desk environment if possible.        Sincerely,        Lucia Poe, DO

## 2021-03-09 PROBLEM — R07.9 CHEST PAIN: Status: ACTIVE | Noted: 2021-03-09

## 2021-03-09 PROBLEM — I20.0 UNSTABLE ANGINA (H): Status: ACTIVE | Noted: 2021-03-09

## 2021-03-09 LAB
CHOLEST SERPL-MCNC: 177 MG/DL
GLUCOSE BLDC GLUCOMTR-MCNC: 118 MG/DL (ref 70–99)
GLUCOSE BLDC GLUCOMTR-MCNC: 148 MG/DL (ref 70–99)
HDLC SERPL-MCNC: 46 MG/DL
KCT BLD-ACNC: 266 SEC (ref 75–150)
KCT BLD-ACNC: 339 SEC (ref 75–150)
LDLC SERPL CALC-MCNC: 98 MG/DL
NONHDLC SERPL-MCNC: 131 MG/DL
TRIGL SERPL-MCNC: 167 MG/DL
TROPONIN I SERPL-MCNC: 0.02 UG/L (ref 0–0.04)
TROPONIN I SERPL-MCNC: <0.015 UG/L (ref 0–0.04)

## 2021-03-09 PROCEDURE — C1769 GUIDE WIRE: HCPCS | Performed by: INTERNAL MEDICINE

## 2021-03-09 PROCEDURE — C9600 PERC DRUG-EL COR STENT SING: HCPCS | Performed by: INTERNAL MEDICINE

## 2021-03-09 PROCEDURE — 99233 SBSQ HOSP IP/OBS HIGH 50: CPT | Mod: 25 | Performed by: INTERNAL MEDICINE

## 2021-03-09 PROCEDURE — 84484 ASSAY OF TROPONIN QUANT: CPT | Performed by: PHYSICIAN ASSISTANT

## 2021-03-09 PROCEDURE — 93005 ELECTROCARDIOGRAM TRACING: CPT

## 2021-03-09 PROCEDURE — 250N000013 HC RX MED GY IP 250 OP 250 PS 637: Performed by: PHYSICIAN ASSISTANT

## 2021-03-09 PROCEDURE — C1874 STENT, COATED/COV W/DEL SYS: HCPCS | Performed by: INTERNAL MEDICINE

## 2021-03-09 PROCEDURE — 92978 ENDOLUMINL IVUS OCT C 1ST: CPT | Performed by: INTERNAL MEDICINE

## 2021-03-09 PROCEDURE — C1887 CATHETER, GUIDING: HCPCS | Performed by: INTERNAL MEDICINE

## 2021-03-09 PROCEDURE — 272N000001 HC OR GENERAL SUPPLY STERILE: Performed by: INTERNAL MEDICINE

## 2021-03-09 PROCEDURE — 93010 ELECTROCARDIOGRAM REPORT: CPT | Performed by: INTERNAL MEDICINE

## 2021-03-09 PROCEDURE — 250N000009 HC RX 250: Performed by: INTERNAL MEDICINE

## 2021-03-09 PROCEDURE — C1894 INTRO/SHEATH, NON-LASER: HCPCS | Performed by: INTERNAL MEDICINE

## 2021-03-09 PROCEDURE — 80061 LIPID PANEL: CPT | Performed by: PHYSICIAN ASSISTANT

## 2021-03-09 PROCEDURE — 258N000003 HC RX IP 258 OP 636: Performed by: INTERNAL MEDICINE

## 2021-03-09 PROCEDURE — 99153 MOD SED SAME PHYS/QHP EA: CPT | Performed by: INTERNAL MEDICINE

## 2021-03-09 PROCEDURE — B240ZZ3 ULTRASONOGRAPHY OF SINGLE CORONARY ARTERY, INTRAVASCULAR: ICD-10-PCS | Performed by: INTERNAL MEDICINE

## 2021-03-09 PROCEDURE — 250N000013 HC RX MED GY IP 250 OP 250 PS 637: Performed by: INTERNAL MEDICINE

## 2021-03-09 PROCEDURE — 99233 SBSQ HOSP IP/OBS HIGH 50: CPT | Performed by: HOSPITALIST

## 2021-03-09 PROCEDURE — 93454 CORONARY ARTERY ANGIO S&I: CPT

## 2021-03-09 PROCEDURE — 93571 IV DOP VEL&/PRESS C FLO 1ST: CPT | Performed by: INTERNAL MEDICINE

## 2021-03-09 PROCEDURE — C1725 CATH, TRANSLUMIN NON-LASER: HCPCS | Performed by: INTERNAL MEDICINE

## 2021-03-09 PROCEDURE — 999N001017 HC STATISTIC GLUCOSE BY METER IP

## 2021-03-09 PROCEDURE — 210N000002 HC R&B HEART CARE

## 2021-03-09 PROCEDURE — B2111ZZ FLUOROSCOPY OF MULTIPLE CORONARY ARTERIES USING LOW OSMOLAR CONTRAST: ICD-10-PCS | Performed by: INTERNAL MEDICINE

## 2021-03-09 PROCEDURE — 250N000011 HC RX IP 250 OP 636: Performed by: INTERNAL MEDICINE

## 2021-03-09 PROCEDURE — 027035Z DILATION OF CORONARY ARTERY, ONE ARTERY WITH TWO DRUG-ELUTING INTRALUMINAL DEVICES, PERCUTANEOUS APPROACH: ICD-10-PCS | Performed by: INTERNAL MEDICINE

## 2021-03-09 PROCEDURE — 4A0335C MEASUREMENT OF ARTERIAL FLOW, CORONARY, PERCUTANEOUS APPROACH: ICD-10-PCS | Performed by: INTERNAL MEDICINE

## 2021-03-09 PROCEDURE — 36415 COLL VENOUS BLD VENIPUNCTURE: CPT | Performed by: PHYSICIAN ASSISTANT

## 2021-03-09 PROCEDURE — 85347 COAGULATION TIME ACTIVATED: CPT

## 2021-03-09 PROCEDURE — C1753 CATH, INTRAVAS ULTRASOUND: HCPCS | Performed by: INTERNAL MEDICINE

## 2021-03-09 PROCEDURE — 99152 MOD SED SAME PHYS/QHP 5/>YRS: CPT | Performed by: INTERNAL MEDICINE

## 2021-03-09 DEVICE — STENT RESOLUTE ONYX DE 2.7FR 3.50X34MM RONYX DE35034UX: Type: IMPLANTABLE DEVICE | Status: FUNCTIONAL

## 2021-03-09 DEVICE — STENT RESOLUTE ONYX DE 2.7FR 2.75X36MM RONYX DE27538UX: Type: IMPLANTABLE DEVICE | Status: FUNCTIONAL

## 2021-03-09 RX ORDER — ASPIRIN 81 MG/1
81 TABLET, CHEWABLE ORAL DAILY
Status: DISCONTINUED | OUTPATIENT
Start: 2021-03-09 | End: 2021-03-09

## 2021-03-09 RX ORDER — NALOXONE HYDROCHLORIDE 0.4 MG/ML
0.2 INJECTION, SOLUTION INTRAMUSCULAR; INTRAVENOUS; SUBCUTANEOUS
Status: DISCONTINUED | OUTPATIENT
Start: 2021-03-09 | End: 2021-03-10 | Stop reason: HOSPADM

## 2021-03-09 RX ORDER — PRASUGREL 10 MG/1
10 TABLET, FILM COATED ORAL DAILY
Status: DISCONTINUED | OUTPATIENT
Start: 2021-03-10 | End: 2021-03-10 | Stop reason: HOSPADM

## 2021-03-09 RX ORDER — ATROPINE SULFATE 0.1 MG/ML
0.5 INJECTION INTRAVENOUS
Status: ACTIVE | OUTPATIENT
Start: 2021-03-09 | End: 2021-03-10

## 2021-03-09 RX ORDER — NITROGLYCERIN 0.4 MG/1
0.4 TABLET SUBLINGUAL EVERY 5 MIN PRN
Status: DISCONTINUED | OUTPATIENT
Start: 2021-03-09 | End: 2021-03-10 | Stop reason: HOSPADM

## 2021-03-09 RX ORDER — HYDRALAZINE HYDROCHLORIDE 20 MG/ML
10 INJECTION INTRAMUSCULAR; INTRAVENOUS EVERY 4 HOURS PRN
Status: DISCONTINUED | OUTPATIENT
Start: 2021-03-09 | End: 2021-03-10 | Stop reason: HOSPADM

## 2021-03-09 RX ORDER — IOPAMIDOL 755 MG/ML
INJECTION, SOLUTION INTRAVASCULAR
Status: DISCONTINUED | OUTPATIENT
Start: 2021-03-09 | End: 2021-03-09 | Stop reason: HOSPADM

## 2021-03-09 RX ORDER — FENTANYL CITRATE 50 UG/ML
25-50 INJECTION, SOLUTION INTRAMUSCULAR; INTRAVENOUS
Status: ACTIVE | OUTPATIENT
Start: 2021-03-09 | End: 2021-03-09

## 2021-03-09 RX ORDER — ASPIRIN 81 MG/1
81 TABLET ORAL DAILY
Status: DISCONTINUED | OUTPATIENT
Start: 2021-03-10 | End: 2021-03-10 | Stop reason: HOSPADM

## 2021-03-09 RX ORDER — LORAZEPAM 2 MG/ML
0.5 INJECTION INTRAMUSCULAR
Status: DISCONTINUED | OUTPATIENT
Start: 2021-03-09 | End: 2021-03-09 | Stop reason: HOSPADM

## 2021-03-09 RX ORDER — OXYCODONE HYDROCHLORIDE 5 MG/1
10 TABLET ORAL EVERY 4 HOURS PRN
Status: DISCONTINUED | OUTPATIENT
Start: 2021-03-09 | End: 2021-03-10 | Stop reason: HOSPADM

## 2021-03-09 RX ORDER — ACETAMINOPHEN 325 MG/1
650 TABLET ORAL EVERY 4 HOURS PRN
Status: DISCONTINUED | OUTPATIENT
Start: 2021-03-09 | End: 2021-03-10 | Stop reason: HOSPADM

## 2021-03-09 RX ORDER — VERAPAMIL HYDROCHLORIDE 2.5 MG/ML
INJECTION, SOLUTION INTRAVENOUS
Status: DISCONTINUED | OUTPATIENT
Start: 2021-03-09 | End: 2021-03-09 | Stop reason: HOSPADM

## 2021-03-09 RX ORDER — HEPARIN SODIUM 1000 [USP'U]/ML
INJECTION, SOLUTION INTRAVENOUS; SUBCUTANEOUS
Status: DISCONTINUED | OUTPATIENT
Start: 2021-03-09 | End: 2021-03-09 | Stop reason: HOSPADM

## 2021-03-09 RX ORDER — ASPIRIN 81 MG/1
81 TABLET, CHEWABLE ORAL ONCE
Status: DISCONTINUED | OUTPATIENT
Start: 2021-03-09 | End: 2021-03-09

## 2021-03-09 RX ORDER — PRASUGREL 5 MG/1
TABLET, FILM COATED ORAL
Status: DISCONTINUED | OUTPATIENT
Start: 2021-03-09 | End: 2021-03-09 | Stop reason: HOSPADM

## 2021-03-09 RX ORDER — OXYCODONE HYDROCHLORIDE 5 MG/1
5 TABLET ORAL EVERY 4 HOURS PRN
Status: DISCONTINUED | OUTPATIENT
Start: 2021-03-09 | End: 2021-03-10 | Stop reason: HOSPADM

## 2021-03-09 RX ORDER — FENTANYL CITRATE 50 UG/ML
INJECTION, SOLUTION INTRAMUSCULAR; INTRAVENOUS
Status: DISCONTINUED | OUTPATIENT
Start: 2021-03-09 | End: 2021-03-09 | Stop reason: HOSPADM

## 2021-03-09 RX ORDER — METOPROLOL TARTRATE 1 MG/ML
5-10 INJECTION, SOLUTION INTRAVENOUS
Status: DISCONTINUED | OUTPATIENT
Start: 2021-03-09 | End: 2021-03-10 | Stop reason: HOSPADM

## 2021-03-09 RX ORDER — LORAZEPAM 0.5 MG/1
0.5 TABLET ORAL
Status: DISCONTINUED | OUTPATIENT
Start: 2021-03-09 | End: 2021-03-09 | Stop reason: HOSPADM

## 2021-03-09 RX ORDER — NALOXONE HYDROCHLORIDE 0.4 MG/ML
0.4 INJECTION, SOLUTION INTRAMUSCULAR; INTRAVENOUS; SUBCUTANEOUS
Status: DISCONTINUED | OUTPATIENT
Start: 2021-03-09 | End: 2021-03-10 | Stop reason: HOSPADM

## 2021-03-09 RX ORDER — ROSUVASTATIN CALCIUM 20 MG/1
20 TABLET, COATED ORAL EVERY EVENING
Status: DISCONTINUED | OUTPATIENT
Start: 2021-03-09 | End: 2021-03-09

## 2021-03-09 RX ORDER — FLUMAZENIL 0.1 MG/ML
0.2 INJECTION, SOLUTION INTRAVENOUS
Status: ACTIVE | OUTPATIENT
Start: 2021-03-09 | End: 2021-03-10

## 2021-03-09 RX ORDER — CARVEDILOL 3.12 MG/1
3.12 TABLET ORAL 2 TIMES DAILY WITH MEALS
Status: DISCONTINUED | OUTPATIENT
Start: 2021-03-09 | End: 2021-03-10 | Stop reason: HOSPADM

## 2021-03-09 RX ORDER — SODIUM CHLORIDE 9 MG/ML
INJECTION, SOLUTION INTRAVENOUS CONTINUOUS
Status: DISCONTINUED | OUTPATIENT
Start: 2021-03-09 | End: 2021-03-09 | Stop reason: HOSPADM

## 2021-03-09 RX ORDER — ONDANSETRON 2 MG/ML
4 INJECTION INTRAMUSCULAR; INTRAVENOUS EVERY 6 HOURS PRN
Status: DISCONTINUED | OUTPATIENT
Start: 2021-03-09 | End: 2021-03-10 | Stop reason: HOSPADM

## 2021-03-09 RX ORDER — ONDANSETRON 4 MG/1
4 TABLET, ORALLY DISINTEGRATING ORAL EVERY 6 HOURS PRN
Status: DISCONTINUED | OUTPATIENT
Start: 2021-03-09 | End: 2021-03-10 | Stop reason: HOSPADM

## 2021-03-09 RX ORDER — LIDOCAINE 40 MG/G
CREAM TOPICAL
Status: DISCONTINUED | OUTPATIENT
Start: 2021-03-09 | End: 2021-03-09 | Stop reason: HOSPADM

## 2021-03-09 RX ORDER — ATORVASTATIN CALCIUM 80 MG/1
80 TABLET, FILM COATED ORAL AT BEDTIME
Status: DISCONTINUED | OUTPATIENT
Start: 2021-03-09 | End: 2021-03-10 | Stop reason: HOSPADM

## 2021-03-09 RX ORDER — NITROGLYCERIN 5 MG/ML
VIAL (ML) INTRAVENOUS
Status: DISCONTINUED | OUTPATIENT
Start: 2021-03-09 | End: 2021-03-09 | Stop reason: HOSPADM

## 2021-03-09 RX ORDER — SODIUM CHLORIDE 9 MG/ML
INJECTION, SOLUTION INTRAVENOUS CONTINUOUS
Status: ACTIVE | OUTPATIENT
Start: 2021-03-09 | End: 2021-03-09

## 2021-03-09 RX ORDER — POTASSIUM CHLORIDE 1500 MG/1
20 TABLET, EXTENDED RELEASE ORAL
Status: COMPLETED | OUTPATIENT
Start: 2021-03-09 | End: 2021-03-09

## 2021-03-09 RX ORDER — SPIRONOLACTONE 25 MG
12.5 TABLET ORAL DAILY
Status: DISCONTINUED | OUTPATIENT
Start: 2021-03-09 | End: 2021-03-10 | Stop reason: HOSPADM

## 2021-03-09 RX ADMIN — OXYCODONE HYDROCHLORIDE 5 MG: 5 TABLET ORAL at 18:41

## 2021-03-09 RX ADMIN — SODIUM CHLORIDE: 9 INJECTION, SOLUTION INTRAVENOUS at 14:03

## 2021-03-09 RX ADMIN — CARVEDILOL 3.12 MG: 3.12 TABLET, FILM COATED ORAL at 18:41

## 2021-03-09 RX ADMIN — SPIRONOLACTONE 12.5 MG: 25 TABLET, FILM COATED ORAL at 10:23

## 2021-03-09 RX ADMIN — CHLORTHALIDONE 25 MG: 25 TABLET ORAL at 08:03

## 2021-03-09 RX ADMIN — ASPIRIN 81 MG CHEWABLE TABLET 81 MG: 81 TABLET CHEWABLE at 10:23

## 2021-03-09 RX ADMIN — AMLODIPINE BESYLATE 5 MG: 5 TABLET ORAL at 08:02

## 2021-03-09 RX ADMIN — CARVEDILOL 3.12 MG: 3.12 TABLET, FILM COATED ORAL at 10:23

## 2021-03-09 RX ADMIN — POTASSIUM CHLORIDE 20 MEQ: 1500 TABLET, EXTENDED RELEASE ORAL at 10:22

## 2021-03-09 RX ADMIN — HYDROCODONE BITARTRATE AND ACETAMINOPHEN 1 TABLET: 5; 325 TABLET ORAL at 05:40

## 2021-03-09 RX ADMIN — LOSARTAN POTASSIUM 100 MG: 100 TABLET, FILM COATED ORAL at 08:03

## 2021-03-09 ASSESSMENT — MIFFLIN-ST. JEOR: SCORE: 2523.52

## 2021-03-09 NOTE — OP NOTE
Procedure Date: 2021      PREOPERATIVE DIAGNOSIS:  Prominent hardware, right ankle, after previous ankle replacement.      POSTOPERATIVE DIAGNOSIS:  Prominent hardware, right ankle, after previous ankle replacement.      SURGICAL PROCEDURE:  Removal of a Francis staple from the right ankle.      ANESTHESIA:  General.      SURGEON:  Venkata Mccallum MD      ASSISTANT:  JOHN Winter      PREAMBLE:  Mr. Frey presented with a very prominent staple over the lateral side of his ankle.  He previously had an ankle replacement done, which is doing well.  He tried conservative management to no avail.  Informed consent was obtained for above-mentioned procedure.      DESCRIPTION OF PROCEDURE:  After adequate induction of a general anesthetic, the patient was positioned supine on the operating table.  The right leg was sterilely prepped and free-draped in the usual fashion.  Tourniquet around the thigh was inflated to 300 mmHg.      A 3 cm incision was made lateral over the ankle.  The staple was exposed.  A Painting was used to pry it loose from the surrounding tissue and bone, and it was then removed.      The wound was closed with a nylon suture.  A sterile dressing and a light compressive bandage were applied.  He tolerated the procedure well and was taken to the recovery room in satisfactory condition.      He can ambulate weightbearing as tolerated.  Sutures will be removed in 2 weeks.         VENKATA MCCALLUM MD             D: 2021   T: 2021   MT: NASIR      Name:     LARA FREY   MRN:      [id number]        Account:        [id number]   :      1955           Procedure Date: 2021      Document: [id number]

## 2021-03-09 NOTE — CONSULTS
[facility]  Consult Note - Hospitalist Service     Date of Admission:  3/8/2021  Consult Requested by: Orthopedics  Reason for Consult: chest pain    Assessment & Plan   Paras Ramos is a 65 year old male with PMH significant for well controlled HTN, obesity class III, GERD, and RAUL on CPAP who was admitted to [facility] on 3/8/2021 by the orthopedic service after he developed chest pain post ankle surgery.     Preoperative COVID-19 admission screen: Negative 3/4/21    History of right ankle repair with removal of loose staple 3/8/21  - Routine postoperative cares per primary service, including IVF, pain control, abx, DVT ppx, and disposition  - PT/OT as per primary service  - Aggressive pulmonary toilet, frequent IS use 10x/hour while awake    Chest discomfort  New finding, Cardiomyopathy EF 25-30% with severe hypokinesia  Developed substernal chest pressure with radiation to the back post procedure. Cardiology consulted who recommended admission.  Troponin negative. Ddimer elevated, CT PE study with cardiomegaly, negative for PE.  Echo revealed LV systolic function is severely reduced, global hypokinesis with severe hypokinesis of anterior/anteroseptal/apical walls. EF 25-30%. No prior ECHOs. Patient denies CP, palpitations, BLE edema or SOB. He has never been able to lie flat due to GERD. He is compliant with antihypertensives which have been well controlled. Compliant with CPAP for RAUL. Mother had hx heart failure and CAD with prior CABG/stents.   - Cardiology consult  - Troponin trend, initial negative  - Telemetry  - Likely coronary angiogram tomorrow given new findings on ECHO, NPO p MN  - Risk stratification: Basic labs, fasting lipid panel, HbA1c   - I notified patient of ECHO results during this consultation    ADDENDUM 2100  Labs returned with HbA1c 7% c/w new diagnosis of DM2 - will need to discuss this with the patient tomorrow.  Liver tests wnl.  Renal function, anemia at wnl. Troponin still negative.  - Continue troponin trend  - Fasting lipid panel in AM  - sliding scale insulin low dose, fingerstick checks    Hypertension, well controlled  PTA regimen: amlodipine 5mg/d, chlorthalidone 25mg/d, losartan 100mg/d  - Continue PTA regimen with hold parameters    RAUL on CPAP, compliant with CPAP    Obesity class III:   BMI ~48 c/w morbid obesity. Increase in all cause mortality. Will need healthy weight loss and close follow up with PCP. I have discussed weight loss on initial consultation given cardiac risk factors and findings.    Chronic stable diagnoses: Appropriate PTA medications will be resumed.  GERD    Diet: Regular Diet Adult  NPO per Anesthesia Guidelines for Procedure/Surgery Except for: Meds    DVT Prophylaxis: Defer to primary service  Díaz Catheter: not present  Code Status: Full Code Full code, d/w patient on admission    The patient's care was discussed with the Attending Physician, Dr. Palacio, Bedside Nurse and Patient.    Tere Joe), PA-C  Hospitalist L[facility]  [facility]    ______________________________________________________________________    Chief Complaint   Chest pain    History is obtained from the patient and electronic health record    History of Present Illness   Paras Ramos is a 65 year old male with PMH significant for well controlled HTN, obesity class III, GERD, and RAUL on CPAP who was admitted to [facility] on 3/8/2021 by the orthopedic service after he developed chest pain post ankle surgery. The patient came in for removal of a Francis staple from his right ankle under general anesthsia with DR. Mccallum. Post surgery he developed acute chest pain radiating to the back. Cardiology was consulted who saw the patient, troponin negative, EKG without acute changes. He has well controlled HTN at baseline, no hx HLD or DM per patient. Cardiology ordered ddimer which  was + therefore sent for CT PE study and ECHO. Negative for PE, but CT showed cardiomegaly. ECHO with changes as above, newly found reduced EF and severe hypokinesia.    During my visit, the patient is chest pain free. No palpitations, lightheadedness, dizziness, or BLE edema. He has never been able to lay flat at night due to his GERD. No MANTILLA though not much activity.     Review of Systems   The 10 point Review of Systems is negative other than noted in the HPI or here.     Past Medical History    I have reviewed this patient's medical history and updated it with pertinent information if needed.   Past Medical History:   Diagnosis Date     Arthritis     OA of ankle and hip     Colon polyp      ED (erectile dysfunction)      Gastro-oesophageal reflux disease      Hip dislocation, right (H)      Hypertension      OA (osteoarthritis) of ankle      Osteoarthritis of hip      Sleep apnea     no cpap     Testicular hypofunction        Past Surgical History   I have reviewed this patient's surgical history and updated it with pertinent information if needed.  Past Surgical History:   Procedure Laterality Date     ARTHRODESIS ANKLE  12/9/2013    Procedure: ARTHRODESIS ANKLE;;  Surgeon: Venkata Mccallum MD;  Location: [facility]     ARTHROPLASTY ANKLE  12/9/2013    Procedure: ARTHROPLASTY ANKLE;  RIGHT TOTAL ANKLE ARTHROPLASTY, SUBTALAR FUSION, LIGAMENT REPAIR ([place] VALENC[place])^;  Surgeon: Venkata Mccallum MD;  Location: [facility]     ARTHROPLASTY HIP  7/23/2014    Procedure: ARTHROPLASTY HIP;  Surgeon: Cirilo Nassar MD;  Location:  OR     ARTHROPLASTY REVISION HIP Right 11/14/2014    Procedure: ARTHROPLASTY REVISION HIP;  Surgeon: Cirilo Nassar MD;  Location:  OR     ARTHROPLASTY REVISION HIP Right 11/9/2015    Procedure: ARTHROPLASTY REVISION HIP;  Surgeon: José Estrella MD;  Location:  OR     ARTHROPLASTY REVISION HIP Right 2/1/2017    Procedure: ARTHROPLASTY REVISION HIP;  Surgeon: Cirilo Nassar  MD;  Location:  OR     CHOLECYSTECTOMY       COLONOSCOPY       REPAIR LIGAMENT ANKLE  12/9/2013    Procedure: REPAIR LIGAMENT ANKLE;;  Surgeon: Venkata Mccallum MD;  Location: [facility]       Social History   I have reviewed this patient's social history and updated it with pertinent information if needed.  Social History     Tobacco Use     Smoking status: Never Smoker     Smokeless tobacco: Never Used   Substance Use Topics     Alcohol use: Yes     Comment: 1 can beer a week     Drug use: No       Family History   I have reviewed this patient's family history and updated it with pertinent information if needed.   Family History   Problem Relation Age of Onset     Heart Failure Mother      Coronary Stenting Mother      Heart Surgery Mother        Medications   I have reviewed this patient's current medications  Medications Prior to Admission   Medication Sig Dispense Refill Last Dose     amLODIPine (NORVASC) 5 MG tablet Take 5 mg by mouth daily    3/8/2021 at 0800     chlorthalidone (HYGROTON) 25 MG tablet Take 25 mg by mouth daily   3/8/2021 at 0800     losartan (COZAAR) 100 MG tablet Take 100 mg by mouth daily    3/8/2021 at 0800     naproxen sodium (ANAPROX) 220 MG tablet Take 220 mg by mouth 2 times daily as needed for moderate pain   Unknown at Unknown time       Allergies   No Known Allergies    Physical Exam   Vital Signs: Temp: 97.6  F (36.4  C) Temp src: Temporal BP: (!) 139/97 Pulse: 72   Resp: 20 SpO2: 97 % O2 Device: Nasal cannula Oxygen Delivery: 2 LPM  Weight: 371 lbs 3 oz    General: Awake, alert, older gentleman who appears stated age. Looks comfortable sitting up in bed. No acute distress.  HEENT: Normocephalic, atraumatic. Extraocular movements intact.   Respiratory: Clear to auscultation bilaterally, no rales, wheezing, or rhonchi.  Cardiovascular: Regular rate and rhythm, +S1 and S2, no murmur auscultated. No peripheral edema.   Gastrointestinal: Soft, non-tender, non-distended. Bowel sounds  present.  Skin: Warm, dry. No obvious rashes or lesions on exposed skin. Dorsalis pedis pulses palpable bilaterally.  Musculoskeletal: No joint swelling, erythema or tenderness. Moves all extremities equally.  Neurologic: AAO x3. Cranial nerves 2-12 grossly intact, normal strength and sensation.  Psychiatric: Appropriate mood and affect. No obvious anxiety or depression.    Data   Results for orders placed or performed during the hospital encounter of 03/08/21 (from the past 24 hour(s))   EKG 12-lead, tracing only   Result Value Ref Range    Interpretation ECG Click View Image link to view waveform and result    Cardiology IP Consult: Patient to be seen: STAT - within 1 hour; Call back #: [phone]; chest pain,; Consultant may enter orders: Yes; Requesting provider? Other supervising provider; Name: Dr Myriam Wu, Shagufta Redding MD     3/8/2021  4:28 PM  Cardiology Consultation      Paras Ramos MRN# [id number]   YOB: 1955 Age: 65 year old   Date of Admission: 3/8/2021     Reason for consult: Post operative chest discomfort           Assessment and Plan:     65-year-old gentleman with no significant cardiac history,   well-controlled hypertension, underwent loose staple removal   today.  Postprocedure he started experiencing substernal chest   pressure with radiation to the back.  Cardiology were consulted   for further evaluation.        1. Chest discomfort in the postoperative setting with radiation   to the back  2. Hypertension  3. History of right ankle repair with removal of a loose staple   during surgery today.    PLAN  -The patient's chest discomfort had essentially resolved at the   time of my evaluation.  His EKG is unchanged compared to the   preoperative tracing.  - Nevertheless, I think it is reasonable   to obtain a limited echocardiogram to assess for regional wall   motion normalities and to check serial troponins and a D-dimer.    Depending on the results,  "further evaluation may be indicated.  -I have discussed the possibility of coronary angiography with   surgical staff.  There is no contraindication to dual   antiplatelet therapy.  -Admission overnight to the CICU/[facility] would be recommended.             Chief Complaint:   No chief complaint on file.           History of Present Illness:   This patient is a 65 year old male with no known cardiac history   who has well-controlled hypertension and obstructive sleep apnea   for which he is fully compliant with CPAP therapy.  He also has a   history of right ankle repair and recently underwent preoperative   evaluation for removal of a loose staple.  At that time he was   thought to be low risk given his lack of serious medical issues   and no active cardiac symptoms.    He underwent surgery this afternoon and the surgery itself lasted   a few minutes.  Postoperatively, however, he started experiencing   substernal chest pressure with radiation to the back.  He has   never experienced similar symptoms in the past.  He received IV   Toradol with relief of his symptoms.    At the time I spoke to him he was chest pain-free.         Physical Exam:   Vitals were reviewed  Blood pressure 107/74, pulse 69, temperature 97.6  F (36.4  C),   temperature source Temporal, resp. rate 8, height 1.88 m (6' 2\"),   weight (!) 168.4 kg (371 lb 3 oz), SpO2 96 %.  Temperatures:  Current - Temp: 97.6  F (36.4  C); Max - Temp    Av.6  F (36.4  C)  Min: 97.5  F (36.4  C)  Max: 97.6  F   (36.4  C)  Respiration range: Resp  Av.8  Min: 8  Max: 24  Pulse range: Pulse  Av.8  Min: 69  Max: 86  Blood pressure range: Systolic (24hrs), Av , Min:95 ,   Max:135   ; Diastolic (24hrs), Av, Min:61, Max:94    Pulse oximetry range: SpO2  Av.1 %  Min: 92 %  Max: 98 %    Intake/Output Summary (Last 24 hours) at 3/8/2021 1604  Last data filed at 3/8/2021 1316  Gross per 24 hour   Intake 200 ml   Output --   Net 200 ml "     Constitutional:   awake, alert, cooperative, no apparent distress, and appears   stated age     Eyes:   Lids and lashes normal, pupils equal, round and reactive to   light, extra ocular muscles intact, sclera clear, conjunctiva   normal     Neck:   supple, symmetrical, trachea midline, no JVD     Back:   symmetric     Lungs:   No increased work of breathing, good air exchange, clear to   auscultation bilaterally, no crackles or wheezing  clear to auscultation     Cardiovascular:   Normal apical impulse, regular rate and rhythm, normal S1 and   S2, no S3 or S4, and no murmur noted.        Abdomen:   non-tender     Musculoskeletal:   motor strength is 5 out of 5 all extremities bilaterally     Neurologic:   Grossly nonfocal     Skin:   no bruising or bleeding     Additional findings:   Edema 1+                 Past Medical History:   I have reviewed this patient's past medical history  Past Medical History:   Diagnosis Date     Arthritis     OA of ankle and hip     Colon polyp      ED (erectile dysfunction)      Gastro-oesophageal reflux disease      Hip dislocation, right (H)      Hypertension      OA (osteoarthritis) of ankle      Osteoarthritis of hip      Sleep apnea     no cpap     Testicular hypofunction              Past Surgical History:   I have reviewed this patient's past surgical history  Past Surgical History:   Procedure Laterality Date     ARTHRODESIS ANKLE  12/9/2013    Procedure: ARTHRODESIS ANKLE;;  Surgeon: Venkata Mccallum MD;  [facility]     ARTHROPLASTY ANKLE  12/9/2013    Procedure: ARTHROPLASTY ANKLE;  RIGHT TOTAL ANKLE ARTHROPLASTY,   SUBTALAR FUSION, LIGAMENT REPAIR ([place])^;  Surgeon:   Venkata Mccallum MD;  [facility]     ARTHROPLASTY HIP  7/23/2014    Procedure: ARTHROPLASTY HIP;  Surgeon: Cirilo Nassar MD;    Location:  OR     ARTHROPLASTY REVISION HIP Right 11/14/2014    Procedure: ARTHROPLASTY REVISION HIP;  Surgeon: Cirilo Nassar MD;   Location: [facility]     ARTHROPLASTY REVISION HIP Right 11/9/2015    Procedure: ARTHROPLASTY REVISION HIP;  Surgeon: José Estrella MD;  Location: [facility]     ARTHROPLASTY REVISION HIP Right 2/1/2017    Procedure: ARTHROPLASTY REVISION HIP;  Surgeon: Cirilo Nassar MD;  Location: [facility]     CHOLECYSTECTOMY       COLONOSCOPY       REPAIR LIGAMENT ANKLE  12/9/2013    Procedure: REPAIR LIGAMENT ANKLE;;  Surgeon: Venkata Mccallum MD;  [facility]               Social History:   I have reviewed this patient's social history  Social History     Tobacco Use     Smoking status: Never Smoker     Smokeless tobacco: Never Used   Substance Use Topics     Alcohol use: Yes     Comment: 1 can beer a week             Family History:   I have reviewed this patient's family history  History reviewed. No pertinent family history.          Allergies:   No Known Allergies          Medications:   I have reviewed this patient's current medications  Medications Prior to Admission   Medication Sig Dispense Refill Last Dose     amLODIPine (NORVASC) 5 MG tablet Take 5 mg by mouth daily      3/8/2021 at 0800     chlorthalidone (HYGROTON) 25 MG tablet Take 25 mg by mouth   daily   3/8/2021 at 0800     losartan (COZAAR) 100 MG tablet Take 100 mg by mouth daily      3/8/2021 at 0800     naproxen sodium (ANAPROX) 220 MG tablet Take 220 mg by mouth 2   times daily as needed for moderate pain   Unknown at Unknown time       Current Facility-Administered Medications Ordered in Epic   Medication Dose Route Frequency Last Rate Last Admin     fentaNYL (PF) (SUBLIMAZE) injection 25-50 mcg  25-50 mcg   Intravenous Q2 Min PRN   50 mcg at 03/08/21 1446     HYDROcodone-acetaminophen (NORCO) 5-325 MG per tablet 1 tablet    1 tablet Oral Once PRN         HYDROmorphone (PF) (DILAUDID) injection 0.3-0.5 mg  0.3-0.5 mg   Intravenous Q5 Min PRN         ketorolac (TORADOL) injection 30 mg  30 mg Intravenous Q6H PRN     30 mg at 03/08/21 1521      lactated ringers infusion   Intravenous Continuous         naloxone (NARCAN) injection 0.2 mg  0.2 mg Intravenous Q2 Min   PRN         naloxone (NARCAN) injection 0.2 mg  0.2 mg Intramuscular Q2 Min   PRN         naloxone (NARCAN) injection 0.4 mg  0.4 mg Intravenous Q2 Min   PRN         naloxone (NARCAN) injection 0.4 mg  0.4 mg Intramuscular Q2 Min   PRN         ondansetron (ZOFRAN-ODT) ODT tab 4 mg  4 mg Oral Q30 Min PRN          Or     ondansetron (ZOFRAN) injection 4 mg  4 mg Intravenous Q30 Min   PRN         ondansetron (ZOFRAN-ODT) ODT tab 4 mg  4 mg Oral Once PRN         prochlorperazine (COMPAZINE) injection 5 mg  5 mg Intravenous   Q6H PRN         ropivacaine (NAROPIN) injection    PRN   7 mL at 21 1309     Current Outpatient Medications Ordered in Epic   Medication     HYDROcodone-acetaminophen (NORCO) 5-325 MG tablet     ondansetron (ZOFRAN-ODT) 4 MG ODT tab             Review of Systems:   The 10 point Review of Systems is negative other than noted in   the HPI            Data:   All laboratory data reviewed  Results for orders placed or performed during the hospital   encounter of 21 (from the past 24 hour(s))   EKG 12-lead, tracing only   Result Value Ref Range    Interpretation ECG Click View Image link to view waveform and   result      EKG results: NSR with no acute ST-T wave abnormalities.      Troponin I   Result Value Ref Range    Troponin I ES <0.015 0.000 - 0.045 ug/L   D dimer quantitative   Result Value Ref Range    D Dimer 0.6 (H) 0.0 - 0.50 ug/ml FEU   Echocardiogram Limited    Narrative    [id number]  [id number]  [id number]  [id number]^ALBERTO^PHI^NANDA           [facility]  Echocardiography Laboratory  [address] [place] [address]        Name: LARA FREY  MRN: [id number]  : 1955  Study Date: 2021 04:05 PM  Age: 65 yrs  Gender: Male  Patient Location: [facility]  Reason For Study: Chest Pain  Ordering Physician: PHI DOMINGUEZ  Referring  Physician: PHI DOMINGUEZ  Performed By: Chad Rudolph RDCS     BSA: 2.8 m2  Height: 74 in  Weight: 371 lb  HR: 81  BP: 107/74 mmHg  _____________________________________________________________________________  __        Procedure  Limited Portable Echo Adult. Optison (NDC #5139-4655) given intravenously.  _____________________________________________________________________________  __        Interpretation Summary     The left ventricle is moderately dilated.  Left ventricular systolic function is severely reduced.  Global hypokinesis is present with severe hypokinesis of the anterior,  anteroseptal and apical walls.  The visual ejection fraction is estimated at 25-30%.  The right ventricle is normal in structure, function and size.  Technically difficult, suboptimal study. There is no comparison study  available.  _____________________________________________________________________________  __        Left Ventricle  The left ventricle is moderately dilated. There is normal left ventricular  wall thickness. Left ventricular systolic function is severely reduced. The  visual ejection fraction is estimated at 25-30%. Global hypokinesis is present  with severe hypokinesis of the anterior, anteroseptal and apical walls.     Right Ventricle  The right ventricle is normal in structure, function and size.     Mitral Valve  The mitral valve is not well visualized.     Tricuspid Valve  The tricuspid valve is not well visualized.        Aortic Valve  The aortic valve is not well visualized.     Pulmonic Valve  The pulmonic valve is not well visualized.     Vessels  Normal size aorta.     Pericardium  There is no pericardial effusion.     _____________________________________________________________________________  __  MMode/2D Measurements & Calculations  IVSd: 1.2 cm  LVIDd: 6.8 cm  LVIDs: 5.2 cm  LVPWd: 1.2 cm  FS: 23.7 %  LV mass(C)d: 382.4 grams  LV mass(C)dI: 135.3 grams/m2  RWT: 0.35                     _____________________________________________________________________________  __        Report approved by: Sofia Nieves 03/08/2021 04:57 PM      CT Chest Pulmonary Embolism w Contrast    Narrative    CT CHEST PULMONARY EMBOLISM WITH CONTRAST  3/8/2021 5:58 PM    HISTORY:  Chest pressure, radiating to the back, pulmonary embolism  suspected, low/intermediate probability, positive D-dimer. Rule out  pulmonary embolus.    TECHNIQUE: Scans obtained from the apices through the diaphragm with  IV contrast. 83 mL Isovue-370     IV injected. Radiation dose for this  scan was reduced using automated exposure control, adjustment of the  mA and/or kV according to patient size, or iterative reconstruction  technique.    COMPARISON:  None available    FINDINGS:  Chest/mediastinum: No evidence of pulmonary embolism. Cardiomegaly. No  significant pericardial effusion. Moderate atherosclerotic vascular  calcification of the coronary arteries.    Lungs and pleura: No pleural effusion or pneumothorax. Bibasilar  pulmonary opacities, likely atelectasis. Scattered calcified pulmonary  granulomas including 7 mm anterior right upper lobe granuloma.    Upper abdomen: Limited evaluation of the upper abdomen due to lack of  coverage and timing of contrast.    Bones and soft tissue: No suspicious osseous lesion.      Impression    IMPRESSION:   1. No evidence of pulmonary embolism.  2. Cardiomegaly.    MARIANA MEADOWS MD   Comprehensive metabolic panel   Result Value Ref Range    Sodium 137 133 - 144 mmol/L    Potassium 3.6 3.4 - 5.3 mmol/L    Chloride 103 94 - 109 mmol/L    Carbon Dioxide 28 20 - 32 mmol/L    Anion Gap 6 3 - 14 mmol/L    Glucose 165 (H) 70 - 99 mg/dL    Urea Nitrogen 17 7 - 30 mg/dL    Creatinine 0.98 0.66 - 1.25 mg/dL    GFR Estimate 79 >60 mL/min/[1.73_m2]    GFR Estimate If Black >90 >60 mL/min/[1.73_m2]    Calcium 8.4 (L) 8.5 - 10.1 mg/dL    Bilirubin Total 0.6 0.2 - 1.3 mg/dL    Albumin 3.2 (L) 3.4 - 5.0  g/dL    Protein Total 6.5 (L) 6.8 - 8.8 g/dL    Alkaline Phosphatase 72 40 - 150 U/L    ALT 30 0 - 70 U/L    AST 22 0 - 45 U/L   CBC with platelets differential   Result Value Ref Range    WBC 6.4 4.0 - 11.0 10e9/L    RBC Count 4.86 4.4 - 5.9 10e12/L    Hemoglobin 14.3 13.3 - 17.7 g/dL    Hematocrit 45.4 40.0 - 53.0 %    MCV 93 78 - 100 fl    MCH 29.4 26.5 - 33.0 pg    MCHC 31.5 31.5 - 36.5 g/dL    RDW 13.0 10.0 - 15.0 %    Platelet Count 223 150 - 450 10e9/L    Diff Method Automated Method     % Neutrophils 65.3 %    % Lymphocytes 26.4 %    % Monocytes 6.4 %    % Eosinophils 1.4 %    % Basophils 0.3 %    % Immature Granulocytes 0.2 %    Nucleated RBCs 0 0 /100    Absolute Neutrophil 4.2 1.6 - 8.3 10e9/L    Absolute Lymphocytes 1.7 0.8 - 5.3 10e9/L    Absolute Monocytes 0.4 0.0 - 1.3 10e9/L    Absolute Eosinophils 0.1 0.0 - 0.7 10e9/L    Absolute Basophils 0.0 0.0 - 0.2 10e9/L    Abs Immature Granulocytes 0.0 0 - 0.4 10e9/L    Absolute Nucleated RBC 0.0    Hemoglobin A1c   Result Value Ref Range    Hemoglobin A1C 7.0 (H) 0 - 5.6 %   Troponin I   Result Value Ref Range    Troponin I ES <0.015 0.000 - 0.045 ug/L

## 2021-03-09 NOTE — PLAN OF CARE
A&O x4. Pt denied pain. VSS, on RA. Up IND, WBAT on R foot. Tele: ST. Angio today, 2 stents to the LAD. R radial site WDL, CDI, 12 ml in TR band. Educated pt and wife on new medications, diabetic and heart healthy diets. Plan for probable discharge tomorrow pending uneventful night. Continue to monitor.

## 2021-03-09 NOTE — OR NURSING
Report called to [facility] RN.  Pt to room via cart with NA in escort.  All belongings sent with pt.  Family notified of transfer.     Taken to CT from PACU.     Discharge meds sent with patient, given to staff.

## 2021-03-09 NOTE — PROGRESS NOTES
[facility]    Hospitalist Progress Note      Assessment & Plan   Paras Ramos is a 65 year old male who was admitted on 3/8/2021 by the orthopedic service after developing chest pain post ankle surgery. Hospitalist service consulted for chest pain.    Preoperative COVID-19 admission screen: Negative 3/4/21     History of right ankle repair with removal of loose staple 3/8/21  - Routine postoperative cares per primary service, including IVF, pain control, abx, DVT ppx, and disposition  - PT/OT as per primary service  - Aggressive pulmonary toilet, frequent IS use 10x/hour while awake  - suture removal in 2 weeks.  - switched to inpatient status on behalf of ortho service     CAD s/p AYO x2  Cardiomyopathy EF 25-30% with severe hypokinesia  Dyslipidemia  Developed substernal chest pressure with radiation to the back post procedure. Troponin <0.015--<0.015--0.019. Ddimer elevated, CT PE study with cardiomegaly, negative for PE.  Echo revealed LV systolic function is severely reduced, global hypokinesis with severe hypokinesis of anterior/anteroseptal/apical walls. EF 25-30%. No prior ECHOs. Patient denies CP, palpitations, BLE edema or SOB. He has never been able to lie flat due to GERD. He is compliant with antihypertensives which have been well controlled. Compliant with CPAP for RAUL. Mother had hx heart failure and CAD with prior CABG/stents.   * Cardiac cath 3/9: appears AYO x2, final report not yet available  - Cardiology consult appreciated  - Start atorvastatin 80mg daily  - ASA 81mg daily  - effient given--follow up pharmacy liaison for cost/coverage--will need for one year  - switched to coreg 3.125mg BID and aldactone 12.5mg daily (in place of chlorthalidone)  - continue PTA losartan and amlodipine  - cardiac rehab tomorrow     Diabetes mellitus, new diagnosis  HgbA1c 7%. Discussed dietary changes, exercise and goal for weight loss that could put him within normal range.  - Plan  "for 3mo of dietary/exercise changes and then recheck A1C  - If no improvement, start medication--usually would start with metformin, but would consider SGLT2 given benefits in setting of heart disease as above  - provided new diabetes packet for education     Hypertension, well controlled  PTA regimen: amlodipine 5mg/d, chlorthalidone 25mg/d, losartan 100mg/d  - Continue amlodipine and losartan  -  Chlorthalidone discontinued, now on coreg and aldactone     RAUL on CPAP, compliant with CPAP     Obesity class III:   BMI ~48 c/w morbid obesity. Increase in all cause mortality. Will need healthy weight loss and close follow up with PCP.   - Recommend dietary adjustments--discussed portion control and mindful eating (switching out cookies, chips for vegetables- lower carb options)     GERD  PTA not on any meds    DVT Prophylaxis: Low Risk/Ambulatory with no VTE prophylaxis indicated  Code Status: Full Code  Expected discharge: Tomorrow, recommended to prior living arrangement once stable post cardiac procedure    Lucia Poe, DO  Text Page (7am - 6pm)    Interval History   Patient seen and examined prior to cath. No chest pain, shortness of breath, nausea, vomiting, fevers, chills noted. Discussed blood sugars and new diagnosis of DM with wife present at bedside. A little disappointed to here \"more is wrong\", but still appreciative of info. Right ankle has no pain, able to ambulate  Spent >35 minutes discussing care plan, reviewing chart, providing bedside DM education    -Data reviewed today: I reviewed all new labs and imaging results over the last 24 hours. I personally reviewed no images or EKG's today.    Physical Exam   Temp: 98.4  F (36.9  C) Temp src: Oral BP: 105/79 Pulse: 73   Resp: 12 SpO2: 93 % O2 Device: Nasal cannula Oxygen Delivery: 1 LPM  Vitals:    03/08/21 1128 03/09/21 0536   Weight: (!) 168.4 kg (371 lb 3 oz) (!) 167.4 kg (369 lb)     Vital Signs with Ranges  Temp:  [98.4  F (36.9  C)] 98.4  F " (36.9  C)  Pulse:  [72-85] 73  Resp:  [12-29] 12  BP: (105-139)/(73-97) 105/79  SpO2:  [93 %-97 %] 93 %  I/O last 3 completed shifts:  In: 480 [P.O.:480]  Out: 2115 [Urine:2115]    Constitutional: Awake, alert, cooperative, no apparent distress  Respiratory: Clear to auscultation bilaterally, no crackles or wheezing  Cardiovascular: Regular rate and rhythm, normal S1 and S2, and no murmur noted  GI: Normal bowel sounds, soft, non-distended, non-tender, obese  Skin/Integumen: No rashes, no cyanosis, trace bilateral lower extremity edema  Other: RLE wrapped with ACE around ankle    Medications     Continuing ACE inhibitor/ARB/ARNI from home medication list OR ACE inhibitor/ARB order already placed during this visit       - MEDICATION INSTRUCTIONS -       Percutaneous Coronary Intervention orders placed (this is information for BPA alerting)       sodium chloride         amLODIPine  5 mg Oral Daily     aspirin  81 mg Oral Daily     aspirin  81 mg Oral Once     [START ON 3/10/2021] aspirin  81 mg Oral Daily     atorvastatin  80 mg Oral Daily     carvedilol  3.125 mg Oral BID w/meals     insulin aspart  1-3 Units Subcutaneous TID AC     insulin aspart  1-3 Units Subcutaneous At Bedtime     losartan  100 mg Oral Daily     [START ON 3/10/2021] prasugrel  10 mg Oral Daily     rosuvastatin  20 mg Oral QPM     spironolactone  12.5 mg Oral Daily       Data   Recent Labs   Lab 03/09/21  0259 03/08/21  2342 03/08/21 2037   WBC  --   --  6.4   HGB  --   --  14.3   MCV  --   --  93   PLT  --   --  223   NA  --   --  137   POTASSIUM  --   --  3.6   CHLORIDE  --   --  103   CO2  --   --  28   BUN  --   --  17   CR  --   --  0.98   ANIONGAP  --   --  6   CASE  --   --  8.4*   GLC  --   --  165*   ALBUMIN  --   --  3.2*   PROTTOTAL  --   --  6.5*   BILITOTAL  --   --  0.6   ALKPHOS  --   --  72   ALT  --   --  30   AST  --   --  22   TROPI 0.019 <0.015 <0.015       Recent Results (from the past 24 hour(s))   CT Chest Pulmonary  Embolism w Contrast    Narrative    CT CHEST PULMONARY EMBOLISM WITH CONTRAST  3/8/2021 5:58 PM    HISTORY:  Chest pressure, radiating to the back, pulmonary embolism  suspected, low/intermediate probability, positive D-dimer. Rule out  pulmonary embolus.    TECHNIQUE: Scans obtained from the apices through the diaphragm with  IV contrast. 83 mL Isovue-370     IV injected. Radiation dose for this  scan was reduced using automated exposure control, adjustment of the  mA and/or kV according to patient size, or iterative reconstruction  technique.    COMPARISON:  None available    FINDINGS:  Chest/mediastinum: No evidence of pulmonary embolism. Cardiomegaly. No  significant pericardial effusion. Moderate atherosclerotic vascular  calcification of the coronary arteries.    Lungs and pleura: No pleural effusion or pneumothorax. Bibasilar  pulmonary opacities, likely atelectasis. Scattered calcified pulmonary  granulomas including 7 mm anterior right upper lobe granuloma.    Upper abdomen: Limited evaluation of the upper abdomen due to lack of  coverage and timing of contrast.    Bones and soft tissue: No suspicious osseous lesion.      Impression    IMPRESSION:   1. No evidence of pulmonary embolism.  2. Cardiomegaly.    MARIANA MEADOWS MD

## 2021-03-09 NOTE — PLAN OF CARE
"BP (!) 128/90 (BP Location: Right arm)   Pulse 85   Temp 97.6  F (36.4  C) (Temporal)   Resp 16   Ht 1.88 m (6' 2\")   Wt (!) 167.4 kg (369 lb)   SpO2 95%   BMI 47.38 kg/m      Patient is alert and oriented, independent in room. Denies chest pain and shortness of breath. Plan for possible angiogram today-NPO ex meds since MN.  "

## 2021-03-09 NOTE — PROGRESS NOTES
"Cardiology Progress Note          Assessment and Plan:         65-year-old gentleman with no significant cardiac history, well-controlled hypertension, underwent loose staple 2021.  Postprocedure he started experiencing substernal chest pressure with radiation to the back.  Cardiology were consulted for further evaluation.           1. Chest discomfort in the postoperative setting with radiation to the back  2. Severely reduced LVEF on echocardiography yesterday, appears global with minor regional variation  3. Hypertension  4. New diagnosis of glucose intolerance/possible DM  5. History of right ankle repair with removal of a loose staple during surgery 2021.     PLAN  - Coronary angio today. I have explained the indications risks and benefits to the patient in detail who is agreeable with proceeding. Per our discussion with surgery yesterday there are no contraindications to AYO.  - Will start carvedilol and aldactone, discontinue chlorthalidone for now  - May benefit from SGLT2 inhibitor  - Will decide intensity of statin therapy after cor angio        Interval History:     Denies recurrent CP. Has had MANTILLA in retrospect for several months.              Review of Systems:   As per subjective, otherwise 5 systems reviewed and negative.           Physical Exam:   Blood pressure 119/74, pulse 77, temperature 97.6  F (36.4  C), temperature source Temporal, resp. rate 29, height 1.88 m (6' 2\"), weight (!) 168.4 kg (371 lb 3 oz), SpO2 97 %.      Vital Sign Ranges  Temperature Temp  Av.6  F (36.4  C)  Min: 97.5  F (36.4  C)  Max: 97.6  F (36.4  C)   Blood pressure Systolic (24hrs), Av , Min:95 , Max:135        Diastolic (24hrs), Av, Min:61, Max:94      Pulse Pulse  Av.4  Min: 69  Max: 86   Respirations Resp  Av  Min: 8  Max: 29   Pulse oximetry SpO2  Av.7 %  Min: 92 %  Max: 98 %         Intake/Output Summary (Last 24 hours) at 3/8/2021 1958  Last data filed at 3/8/2021 1316  Gross " per 24 hour   Intake 200 ml   Output --   Net 200 ml       Constitutional: NAD  Skin: Warm and dry  Neck: No JVD  Lungs: CTA  Cardiovascular: RRR, no m/r/g  Abdomen: Soft, non tender.  Extremities and Back: +1 BLE edema  Neurological: Nonfocal           Medications:     I have reviewed this patient's current medications.              Data:     Labs reviewed.     Tele: SAIRA Wu MD, MSTEPHANIE.

## 2021-03-10 ENCOUNTER — APPOINTMENT (OUTPATIENT)
Dept: PHYSICAL THERAPY | Facility: CLINIC | Age: 66
End: 2021-03-10
Attending: INTERNAL MEDICINE
Payer: COMMERCIAL

## 2021-03-10 VITALS
HEIGHT: 74 IN | WEIGHT: 315 LBS | BODY MASS INDEX: 40.43 KG/M2 | TEMPERATURE: 98 F | RESPIRATION RATE: 16 BRPM | HEART RATE: 80 BPM | OXYGEN SATURATION: 97 % | DIASTOLIC BLOOD PRESSURE: 104 MMHG | SYSTOLIC BLOOD PRESSURE: 141 MMHG

## 2021-03-10 LAB
ANION GAP SERPL CALCULATED.3IONS-SCNC: 4 MMOL/L (ref 3–14)
BUN SERPL-MCNC: 12 MG/DL (ref 7–30)
CALCIUM SERPL-MCNC: 8.7 MG/DL (ref 8.5–10.1)
CHLORIDE SERPL-SCNC: 103 MMOL/L (ref 94–109)
CO2 SERPL-SCNC: 30 MMOL/L (ref 20–32)
CREAT SERPL-MCNC: 0.86 MG/DL (ref 0.66–1.25)
ERYTHROCYTE [DISTWIDTH] IN BLOOD BY AUTOMATED COUNT: 12.9 % (ref 10–15)
GFR SERPL CREATININE-BSD FRML MDRD: 90 ML/MIN/{1.73_M2}
GLUCOSE BLDC GLUCOMTR-MCNC: 134 MG/DL (ref 70–99)
GLUCOSE BLDC GLUCOMTR-MCNC: 139 MG/DL (ref 70–99)
GLUCOSE SERPL-MCNC: 120 MG/DL (ref 70–99)
HCT VFR BLD AUTO: 47.7 % (ref 40–53)
HGB BLD-MCNC: 15.1 G/DL (ref 13.3–17.7)
MCH RBC QN AUTO: 29.5 PG (ref 26.5–33)
MCHC RBC AUTO-ENTMCNC: 31.7 G/DL (ref 31.5–36.5)
MCV RBC AUTO: 93 FL (ref 78–100)
PLATELET # BLD AUTO: 199 10E9/L (ref 150–450)
POTASSIUM SERPL-SCNC: 4 MMOL/L (ref 3.4–5.3)
RBC # BLD AUTO: 5.12 10E12/L (ref 4.4–5.9)
SODIUM SERPL-SCNC: 137 MMOL/L (ref 133–144)
WBC # BLD AUTO: 7.3 10E9/L (ref 4–11)

## 2021-03-10 PROCEDURE — 85027 COMPLETE CBC AUTOMATED: CPT | Performed by: INTERNAL MEDICINE

## 2021-03-10 PROCEDURE — 93010 ELECTROCARDIOGRAM REPORT: CPT | Performed by: INTERNAL MEDICINE

## 2021-03-10 PROCEDURE — 97161 PT EVAL LOW COMPLEX 20 MIN: CPT | Mod: GP

## 2021-03-10 PROCEDURE — 97110 THERAPEUTIC EXERCISES: CPT | Mod: GP

## 2021-03-10 PROCEDURE — 93005 ELECTROCARDIOGRAM TRACING: CPT

## 2021-03-10 PROCEDURE — 99232 SBSQ HOSP IP/OBS MODERATE 35: CPT | Performed by: HOSPITALIST

## 2021-03-10 PROCEDURE — 250N000013 HC RX MED GY IP 250 OP 250 PS 637: Performed by: PHYSICIAN ASSISTANT

## 2021-03-10 PROCEDURE — 250N000013 HC RX MED GY IP 250 OP 250 PS 637: Performed by: INTERNAL MEDICINE

## 2021-03-10 PROCEDURE — 36415 COLL VENOUS BLD VENIPUNCTURE: CPT | Performed by: INTERNAL MEDICINE

## 2021-03-10 PROCEDURE — 99232 SBSQ HOSP IP/OBS MODERATE 35: CPT | Mod: 25 | Performed by: INTERNAL MEDICINE

## 2021-03-10 PROCEDURE — 80048 BASIC METABOLIC PNL TOTAL CA: CPT | Performed by: INTERNAL MEDICINE

## 2021-03-10 PROCEDURE — 97530 THERAPEUTIC ACTIVITIES: CPT | Mod: GP

## 2021-03-10 PROCEDURE — 999N001017 HC STATISTIC GLUCOSE BY METER IP

## 2021-03-10 RX ORDER — PRASUGREL 10 MG/1
10 TABLET, FILM COATED ORAL DAILY
Qty: 30 TABLET | Refills: 0 | Status: SHIPPED | OUTPATIENT
Start: 2021-03-11 | End: 2021-04-09

## 2021-03-10 RX ORDER — SPIRONOLACTONE 25 MG/1
12.5 TABLET ORAL DAILY
Qty: 30 TABLET | Refills: 0 | Status: SHIPPED | OUTPATIENT
Start: 2021-03-11 | End: 2021-04-09

## 2021-03-10 RX ORDER — NITROGLYCERIN 0.4 MG/1
TABLET SUBLINGUAL
Qty: 15 TABLET | Refills: 0 | Status: SHIPPED | OUTPATIENT
Start: 2021-03-10 | End: 2023-09-14

## 2021-03-10 RX ORDER — CARVEDILOL 3.12 MG/1
3.12 TABLET ORAL 2 TIMES DAILY WITH MEALS
Qty: 60 TABLET | Refills: 0 | Status: SHIPPED | OUTPATIENT
Start: 2021-03-10 | End: 2021-04-09

## 2021-03-10 RX ORDER — ATORVASTATIN CALCIUM 80 MG/1
80 TABLET, FILM COATED ORAL AT BEDTIME
Qty: 30 TABLET | Refills: 0 | Status: SHIPPED | OUTPATIENT
Start: 2021-03-10 | End: 2021-04-09

## 2021-03-10 RX ADMIN — OXYCODONE HYDROCHLORIDE 5 MG: 5 TABLET ORAL at 05:51

## 2021-03-10 RX ADMIN — PRASUGREL 10 MG: 10 TABLET, FILM COATED ORAL at 09:21

## 2021-03-10 RX ADMIN — LOSARTAN POTASSIUM 100 MG: 100 TABLET, FILM COATED ORAL at 09:22

## 2021-03-10 RX ADMIN — ATORVASTATIN CALCIUM 80 MG: 80 TABLET, FILM COATED ORAL at 05:49

## 2021-03-10 RX ADMIN — ASPIRIN 81 MG: 81 TABLET, DELAYED RELEASE ORAL at 09:22

## 2021-03-10 RX ADMIN — AMLODIPINE BESYLATE 5 MG: 5 TABLET ORAL at 09:21

## 2021-03-10 RX ADMIN — CARVEDILOL 3.12 MG: 3.12 TABLET, FILM COATED ORAL at 09:22

## 2021-03-10 RX ADMIN — SPIRONOLACTONE 12.5 MG: 25 TABLET, FILM COATED ORAL at 09:21

## 2021-03-10 NOTE — PLAN OF CARE
Vital signs stable.  Medications discussed, Information reviewed, Questions answered, and concerns addressed. Follow-up Instructions reviewed. Pt belongings accounted for and sent home with them. Pt left via wheelchair and driven by family.

## 2021-03-10 NOTE — DISCHARGE INSTRUCTIONS
Reasons to contact your surgeon:    1. Signs of possible infection: Check your incision daily for redness, swelling, warmth, red streaks or foul drainage.   2. Elevated temperature.  3. Pain not controlled with pain medication and/or rest.   4. Uncontrolled nausea or vomiting.  5. Any questions or concerns.        Same Day Surgery Discharge Instructions for  Sedation and General Anesthesia       It's not unusual to feel dizzy, light-headed or faint for up to 24 hours after surgery or while taking pain medication.  If you have these symptoms: sit for a few minutes before standing and have someone assist you when you get up to walk or use the bathroom.      You should rest and relax for the next 24 hours. We recommend you make arrangements to have an adult stay with you for at least 24 hours after your discharge.  Avoid hazardous and strenuous activity.      DO NOT DRIVE any vehicle or operate mechanical equipment for 24 hours following the end of your surgery.  Even though you may feel normal, your reactions may be affected by the medication you have received.      Do not drink alcoholic beverages for 24 hours following surgery.       Slowly progress to your regular diet as you feel able. It's not unusual to feel nauseated and/or vomit after receiving anesthesia.  If you develop these symptoms, drink clear liquids (apple juice, ginger ale, broth, 7-up, etc. ) until you feel better.  If your nausea and vomiting persists for 24 hours, please notify your surgeon.        All narcotic pain medications, along with inactivity and anesthesia, can cause constipation. Drinking plenty of liquids and increasing fiber intake will help.      For any questions of a medical nature, call your surgeon.      Do not make important decisions for 24 hours.      If you had general anesthesia, you may have a sore throat for a couple of days related to the breathing tube used during surgery.  You may use Cepacol lozenges to help with this  discomfort.  If it worsens or if you develop a fever, contact your surgeon.       If you feel your pain is not well managed with the pain medications prescribed by your surgeon, please contact your surgeon's office to let them know so they can address your concerns.       CoVid 19 Information    We want to give you information regarding Covid. Please consult your primary care provider with any questions you might have.     Patient who have symptoms (cough, fever, or shortness of breath), need to isolate for 7 days from when symptoms started OR 72 hours after fever resolves (without fever reducing medications) AND improvement of respiratory symptoms (whichever is longer).      Isolate yourself at home (in own room/own bathroom if possible)    Do Not allow any visitors    Do Not go to work or school    Do Not go to Buddhist,  centers, shopping, or other public places.    Do Not shake hands.    Avoid close and intimate contact with others (hugging, kissing).    Follow CDC recommendations for household cleaning of frequently touched services.     After the initial 7 days, continue to isolate yourself from household members as much as possible. To continue decrease the risk of community spread and exposure, you and any members of your household should limit activities in public for 14 days after starting home isolation.     You can reference the following CDC link for helpful home isolation/care tips:  https://www.cdc.gov/coronavirus/2019-ncov/downloads/10Things.pdf    Protect Others:    Cover Your Mouth and Nose with a mask, disposable tissue or wash cloth to avoid spreading germs to others.    Wash your hands and face frequently with soap and water    Call Your Primary Doctor If: Breathing difficulty develops or you become worse.    For more information about COVID19 and options for caring for yourself at home, please visit the CDC website at  https://www.cdc.gov/coronavirus/2019-ncov/about/steps-when-sick.html  For more options for care at [facility], please visit our website at [email or web address]      While you were at the hospital today you received Toradol, an antiinflammatory medication similar to Ibuprofen.  You should not take other antiinflammatory medication, such as Ibuprofen, Motrin, Advil, Aleve, Naprosyn, etc, until 9:20  PM on 3/8/2021.         **If you have questions or concerns about your procedure,   call Dr. Mccallum at [phone]**    Cardiac Angioplasty/Stent Discharge Instructions - Radial    After you go home:      Have an adult stay with you until tomorrow.    Drink extra fluids for 2 days.    You may resume your normal diet.    No smoking       For 24 hours - due to the sedation you received:    Relax and take it easy.    Do NOT make any important or legal decisions.    Do NOT drive or operate machines at home or at work.    Do NOT drink alcohol.    Care of Wrist & Groin Puncture Sites:      For the first 24 hrs - check the puncture site every 1-2 hours while awake.    For 2 days, when you cough, sneeze, laugh or move your bowels,  hold your hand over the puncture site and press firmly on/above the site    Remove the bandaid after 24 hours. If there is minor oozing, apply another bandaid and remove it after 12 hours.    It is normal to have a small bruise or pea size lump at the site.    You may shower tomorrow. Do NOT take a bath, or use a hot tub or pool for at least 3 days. Do NOT scrub the site. Do not use lotion or powder near the puncture site.    Activity - For 2 days:    Wrist site:      do not use your hand or arm to support your weight (such as rising from a chair)     do not bend your wrist (such as lifting a garage door).    do not lift more than 5 pounds or exercise your arm (such as tennis, golf or bowling).    Do NOT do any heavy activity such as exercise, lifting, or straining.      Bleeding:    If you start bleeding from the site in your wrist:      Sit down and press firmly on/above the site with your fingers for 10 minutes.     Once bleeding stops, keep your arm still for 2 hours.     Call [facility] as soon as you can.    If you start bleeding from the site in your groin:      Lie down flat and press firmly on/above the site for 10 minutes.    Once bleeding stops, lay flat for 2 hours.     Call [facility] as soon as you can.    Call 911 right away if you have heavy bleeding or bleeding that does not stop.      Medicines:      If you are taking an antiplatelet medication such as Plavix, Brilinta or Effient, do not stop taking it until you talk to your cardiologist.        If you are on Metformin (Glucophage), do not restart it until you have blood tests (within 2 to 3 days after discharge).  After you have your blood drawn, you may restart the Metformin.     Take your medications, including blood thinners, unless your provider tells you not to.      If you take Coumadin (Warfarin), have your INR checked by your provider in  3-5 days. Call your clinic to schedule this.    If you have stopped any medicines, check with your provider about when to restart them.    Follow Up Appointments:      Follow up with [facility] Nurse Practitioner at [facility] of patient preference in 7-10 days.    Cardiac Rehab will contact you for follow up care.    Call the clinic if:      You have increased pain or a large or growing hard lump around the site.    The site is red, swollen, hot or tender.    Blood or fluid is draining from the site.    You have chills or a fever greater than 101 F (38 C).    Your arm or leg feels numb, cool or changes color.    You have hives, a rash or unusual itching.    New pain in the back or belly that you cannot control with Tylenol.    Any questions or concerns.    Other Instructions:      If you received a stent - carry your stent card with you at all  times.        [facility] [place]:    [phone] [facility] (7 days a week)

## 2021-03-10 NOTE — PLAN OF CARE
"/82 (BP Location: Left arm)   Pulse 85   Temp 98.4  F (36.9  C) (Oral)   Resp 16   Ht 1.88 m (6' 2\")   Wt (!) 167.4 kg (369 lb)   SpO2 94%   BMI 47.38 kg/m      Patient is alert and oriented, independent in room. Denies chest pain and shortness of breath. Right radial angio site intact. Plan for cardiac rehab and discharge today.  "

## 2021-03-10 NOTE — DISCHARGE SUMMARY
[facility]    Discharge Summary  Hospitalist    Date of Admission:  3/8/2021  Date of Discharge:  3/10/2021  Discharging Provider: Lucia Poe DO  Date of Service (when I saw the patient): 03/10/21    Discharge Diagnoses   COVID-19 screening, negative  History of right ankle repair with removal of loose staple 3/8/21  Two vessel obstructive CAD s/p AYO x2 to LAD, residual disease of RPDA  Ischemic cardiomyopathy EF 25-30% with severe hypokinesia  Dyslipidemia  Diabetes mellitus, new diagnosis  Hypertension  RAUL on CPAP, compliant with CPAP  Obesity class III:   GERD    History of Present Illness   Paras Ramos is an 65 year old male who presented for outpatient right ankle repair with loose staple removal. Hospitalist service was consulted for chest pain and patient underwent stent placement x2 for coronary artery disease. He was also found to be diabetic and was provided education on diet and exercise to encourage weight loss prior to starting on medication.    Hospital Course   Paras Ramos was admitted on 3/8/2021.  The following problems were addressed during his hospitalization:    COVID-19 screening-negative     History of right ankle repair with removal of loose staple 3/8/21  - suture removal in 2 weeks.  - Follow up with Dr Mccallum as needed, discharge instructions post surgery included in discharge packet     Two vessel obstructive CAD s/p AYO x2 to LAD, residual disease of RPDA  Ischemic Cardiomyopathy EF 25-30% with severe hypokinesia  Dyslipidemia  Developed substernal chest pressure with radiation to the back post procedure. Troponin <0.015--<0.015--0.019. Ddimer elevated, CT PE study with cardiomegaly, negative for PE.  Echo revealed LV systolic function is severely reduced, global hypokinesis with severe hypokinesis of anterior/anteroseptal/apical walls. EF 25-30%. Mother had hx heart failure and CAD with prior CABG/stents.   * Cardiac cath 3/9: appears AYO x2 to  LAD (severe, diffuse mid-distal LAD disease), also has 70% stenosis in RPDA that will need staged procedure pending symptoms.  - Cardiology consult appreciated  - Start atorvastatin 80mg daily  - DAPT with ASA 81mg and effient 10mg daily  - switched to coreg 3.125mg BID and aldactone 12.5mg daily (in place of chlorthalidone), continued on discharge  - continue PTA losartan and amlodipine  - outpatient cardiac rehab  - follow up with cardiology in 2 weeks and then in 2-3mo for cardiac MRI to assess LV systolic function  - follow up with PCP within one week with BMP at that time  - provided work note excusing him from work until 3/15 with reduced hours and work-load upon return until follow up with cardiology    Diabetes mellitus, new diagnosis  HgbA1c 7%. Discussed dietary changes, exercise and goal for weight loss that could put him within normal range. Looks like PCP also checked A1C on 3/3 which came back at 7.5%  - Plan for 3mo of dietary/exercise changes and then recheck A1C  - If no improvement, start medication--usually would start with metformin, but would consider SGLT2 given benefits in setting of heart disease as above  - provided new diabetes packet for education     Hypertension  PTA regimen: amlodipine 5mg/d, chlorthalidone 25mg/d, losartan 100mg/d  - Continue amlodipine and losartan  - Chlorthalidone discontinued, now on coreg and aldactone as above     RAUL on CPAP, compliant with CPAP  Continue on discharge     Obesity class III:   BMI ~48 c/w morbid obesity. Increase in all cause mortality. Will need healthy weight loss and close follow up with PCP.   - Recommend dietary adjustments--discussed portion control and mindful eating (switching out cookies, chips for vegetables- lower carb options)  - eventually once stable from cardiac standpoint could consider gastric bypass surgery     GERD  PTA not on any meds    Lucia Poe, DO    Significant Results and Procedures   3/9 Cardiac Cath: AYO x2 to LAD  (severe, diffuse mid-distal LAD disease), also has 70% stenosis in RPDA that will need staged procedure pending symptoms.    Pending Results   NA    Code Status   Full Code       Primary Care Physician   [facility]    Physical Exam   Temp: 98  F (36.7  C) Temp src: Oral BP: (!) 141/104(Post CR) Pulse: 80   Resp: 16 SpO2: 97 % O2 Device: None (Room air) Oxygen Delivery: 1 LPM  Vitals:    03/08/21 1128 03/09/21 0536   Weight: (!) 168.4 kg (371 lb 3 oz) (!) 167.4 kg (369 lb)     Vital Signs with Ranges  Temp:  [98  F (36.7  C)] 98  F (36.7  C)  Pulse:  [54-85] 80  Resp:  [12-16] 16  BP: ()/() 141/104  SpO2:  [90 %-100 %] 97 %  I/O last 3 completed shifts:  In: -   Out: 875 [Urine:875]    Patient seen and examined. Physically feels okay other than a caffeine withdrawal headache. A little overwhelmed thinking about all his new medications and returning to work. Discussed work load and trying to reduce his hours. He is working with his supervisor on this. No chest pain, shortness of breath, nausea, vomiting, fevers, chills.     Constitutional: Awake, alert, cooperative, no apparent distress.  Eyes: Conjunctiva and pupils examined and normal.  HEENT: Moist mucous membranes, normal dentition.  Respiratory: Clear to auscultation bilaterally, no crackles or wheezing.  Cardiovascular: Regular rate and rhythm, normal S1 and S2, and no murmur noted.  GI: Soft, non-distended, non-tender, normal bowel sounds, obese  Lymph/Hematologic: No anterior cervical or supraclavicular adenopathy.  Skin: No rashes, no cyanosis, trace lower extremity edema on right  Musculoskeletal: No joint swelling, erythema or tenderness.  Neurologic: Cranial nerves 2-12 intact, normal strength and sensation.  Psychiatric: Alert, oriented to person, place and time, no obvious anxiety or depression.    Discharge Disposition   Discharged to home  Condition at discharge: Stable    Consultations This Hospital Stay   CARDIOLOGY IP  CONSULT  HOSPITALIST IP CONSULT  CARDIOLOGY IP CONSULT  NUTRITION SERVICES ADULT IP CONSULT  CARDIAC REHAB IP CONSULT  PHARMACY IP CONSULT  PHARMACY IP CONSULT  PHARMACY LIAISON FOR MEDICATION COVERAGE CONSULT  SMOKING CESSATION PROGRAM IP CONSULT    Time Spent on this Encounter   ILucia DO, personally saw the patient today and spent greater than 30 minutes discharging this patient.    Discharge Orders      MRI Cardiac w/contrast and stress     Basic metabolic panel     CARDIAC REHAB REFERRAL      Discharge Order: F/U with Cardiac  LINA      Follow-Up with Cardiologist      Discharge Instructions    Review discharge instructions as directed by Provider.     Ice to affected area    Ice pack to affected area PRN (as needed).     Elevate affected extremity     Weight bearing status - As tolerated     No dressing change    May remove dressing POD 3 and replace with a band-aid.     Reason for your hospital stay    Staple removal, then heart work-up for chest pain, now with two cardiac stents     Follow-up and recommended labs and tests     Follow up with primary care provider, [facility], within 7 days for hospital follow- up.  The following labs/tests are recommended: BMP in one week and HgbA1C in 3 months.     Activity    Your activity upon discharge: activity as tolerated     Discharge Instructions    1. Stop taking chlorthalidone.    2. Start taking carvedilol (coreg) 3.125mg twice daily and spironolactone 12.5mg once daily.    3. Start taking atorvastatin 80mg daily for cholesterol/heart disease    4. Start taking aspirin 81mg once daily and take effient 10mg once daily--these both need to be taken to keep the stent open.    5. Nitroglycerin was prescribed--this is to be taken IF you have chest pain, then follow the instructions to call 911    6. You will see cardiology in 2 weeks and then follow up in a few months for cardiac MRI to evaluate your heart squeeze function.    7. Continue to  work on diet and exercise! If no improvement in HgbA1C in 3 months, then you should be started on metformin     Full Code     Diet    Follow this diet upon discharge: Low carb, low saturated fat diet     Discharge Medications   Discharge Medication List as of 3/10/2021 12:34 PM      START taking these medications    Details   aspirin (ASA) 81 MG EC tablet Take 1 tablet (81 mg) by mouth daily, Disp-30 tablet, R-0, E-PrescribeFuture refills by PCP Dr. [facility] with phone number [phone].      atorvastatin (LIPITOR) 80 MG tablet Take 1 tablet (80 mg) by mouth At Bedtime, Disp-30 tablet, R-0, E-PrescribeFuture refills by PCP Dr. [facility] with phone number [phone].      carvedilol (COREG) 3.125 MG tablet Take 1 tablet (3.125 mg) by mouth 2 times daily (with meals), Disp-60 tablet, R-0, E-PrescribeFuture refills by PCP Dr. [facility] with phone number [phone].      HYDROcodone-acetaminophen (NORCO) 5-325 MG tablet Take 1-2 tablets by mouth every 4 hours as needed for moderate to severe pain, Disp-10 tablet, R-0, E-Prescribe      nitroGLYcerin (NITROSTAT) 0.4 MG sublingual tablet For chest pain place 1 tablet under the tongue every 5 minutes for 3 doses. If symptoms persist 5 minutes after 1st dose call 911., Disp-15 tablet, R-0, E-PrescribeFuture refills by PCP Dr. [facility] with phone number [phone].      ondansetron (ZOFRAN-ODT) 4 MG ODT tab Take 1-2 tablets (4-8 mg) by mouth every 8 hours as needed for nausea, Disp-4 tablet, R-0, E-Prescribe      prasugrel (EFFIENT) 10 MG TABS tablet Take 1 tablet (10 mg) by mouth daily, Disp-30 tablet, R-0, E-PrescribeFuture refills by PCP Dr. [facility] with phone number [phone].      spironolactone (ALDACTONE) 25 MG tablet Take 0.5 tablets (12.5 mg) by mouth daily, Disp-30 tablet, R-0, E-PrescribeFuture refills by PCP Dr. [facility] with phone number [phone].          CONTINUE these medications which have NOT CHANGED    Details   amLODIPine (NORVASC) 5 MG tablet Take 5 mg by mouth daily , Historical      losartan (COZAAR) 100 MG tablet Take 100 mg by mouth daily , Historical      naproxen sodium (ANAPROX) 220 MG tablet Take 220 mg by mouth 2 times daily as needed for moderate pain, Historical         STOP taking these medications       chlorthalidone (HYGROTON) 25 MG tablet Comments:   Reason for Stopping:             Allergies   No Known Allergies  Data   Most Recent 3 CBC's:  Recent Labs   Lab Test 03/10/21  0543 03/08/21 2037 02/03/17  0654 02/01/17  1832 02/01/17  1832 08/28/15  1400 08/28/15  1400   WBC 7.3 6.4  --   --   --   --  8.3   HGB 15.1 14.3 13.5   < >  --    < > 15.4   MCV 93 93  --   --   --   --  93    223  --   --  198   < > 289    < > = values in this interval not displayed.      Most Recent 3 BMP's:  Recent Labs   Lab Test 03/10/21  0543 03/08/21 2037 02/02/17  0630    137 137   POTASSIUM 4.0 3.6 4.3   CHLORIDE 103 103 101   CO2 30 28 30   BUN 12 17 12   CR 0.86 0.98 0.98   ANIONGAP 4 6 6   CASE 8.7 8.4* 8.2*   * 165* 137*     Most Recent 2 LFT's:  Recent Labs   Lab Test 03/08/21 2037 12/24/14  1100   AST 22 18   ALT 30  --    ALKPHOS 72  --    BILITOTAL 0.6  --      Most Recent INR's and Anticoagulation Dosing History:  Anticoagulation Dose History     There is no flowsheet data to display.        Most Recent 3 Troponin's:  Recent Labs   Lab Test 03/09/21  0259 03/08/21  2342 03/08/21 2037   TROPI 0.019 <0.015 <0.015     Most Recent Cholesterol Panel:  Recent Labs   Lab Test 03/09/21  0529   CHOL 177   LDL 98   HDL 46   TRIG 167*     Most Recent 6 Bacteria Isolates From Any Culture (See EPIC Reports for Culture Details):  Recent Labs   Lab Test 11/09/15  0915 11/09/15  0831 08/28/15  1530 11/15/14  2100 11/15/14  2055 11/14/14  1715   CULT No anaerobes isolated  No growth No anaerobes isolated  No growth No anaerobes isolated  No  growth No growth No growth On day 7, isolated in broth only: Staphylococcus lugdunensis not isolated or   reported on routine culture  Critical Value, preliminary result only, called to and read back by Gail Care   Coordinator for Dr. Lito Nassar at 1603 on 11/21/2014  Light growth Propionibacterium acnes  *  No growth  No growth     Most Recent TSH, T4 and A1c Labs:  Recent Labs   Lab Test 03/08/21 2037   A1C 7.0*     Results for orders placed or performed during the hospital encounter of 03/08/21   CT Chest Pulmonary Embolism w Contrast    Narrative    CT CHEST PULMONARY EMBOLISM WITH CONTRAST  3/8/2021 5:58 PM    HISTORY:  Chest pressure, radiating to the back, pulmonary embolism  suspected, low/intermediate probability, positive D-dimer. Rule out  pulmonary embolus.    TECHNIQUE: Scans obtained from the apices through the diaphragm with  IV contrast. 83 mL Isovue-370     IV injected. Radiation dose for this  scan was reduced using automated exposure control, adjustment of the  mA and/or kV according to patient size, or iterative reconstruction  technique.    COMPARISON:  None available    FINDINGS:  Chest/mediastinum: No evidence of pulmonary embolism. Cardiomegaly. No  significant pericardial effusion. Moderate atherosclerotic vascular  calcification of the coronary arteries.    Lungs and pleura: No pleural effusion or pneumothorax. Bibasilar  pulmonary opacities, likely atelectasis. Scattered calcified pulmonary  granulomas including 7 mm anterior right upper lobe granuloma.    Upper abdomen: Limited evaluation of the upper abdomen due to lack of  coverage and timing of contrast.    Bones and soft tissue: No suspicious osseous lesion.      Impression    IMPRESSION:   1. No evidence of pulmonary embolism.  2. Cardiomegaly.    MARIANA MEADOWS MD   Echocardiogram Limited    Narrative    [id number]  [id number]  [id number]  [id number]^ALI^BILAL^[facility]  Echocardiography  Laboratory  [address] [place] [address]        Name: LARA FREY  MRN: [id number]  : 1955  Study Date: 2021 04:05 PM  Age: 65 yrs  Gender: Male  Patient Location: [facility]  Reason For Study: Chest Pain  Ordering Physician: PHI DOMINGUEZ  Referring Physician: PHI DOMINGUEZ  Performed By: Chad Rudolph RDCS     BSA: 2.8 m2  Height: 74 in  Weight: 371 lb  HR: 81  BP: 107/74 mmHg  _____________________________________________________________________________  __        Procedure  Limited Portable Echo Adult. Optison (NDC #[id number]) given intravenously.  _____________________________________________________________________________  __        Interpretation Summary     The left ventricle is moderately dilated.  Left ventricular systolic function is severely reduced.  Global hypokinesis is present with severe hypokinesis of the anterior,  anteroseptal and apical walls.  The visual ejection fraction is estimated at 25-30%.  The right ventricle is normal in structure, function and size.  Technically difficult, suboptimal study. There is no comparison study  available.  _____________________________________________________________________________  __        Left Ventricle  The left ventricle is moderately dilated. There is normal left ventricular  wall thickness. Left ventricular systolic function is severely reduced. The  visual ejection fraction is estimated at 25-30%. Global hypokinesis is present  with severe hypokinesis of the anterior, anteroseptal and apical walls.     Right Ventricle  The right ventricle is normal in structure, function and size.     Mitral Valve  The mitral valve is not well visualized.     Tricuspid Valve  The tricuspid valve is not well visualized.        Aortic Valve  The aortic valve is not well visualized.     Pulmonic Valve  The pulmonic valve is not well visualized.     Vessels  Normal size aorta.     Pericardium  There is no pericardial effusion.      _____________________________________________________________________________  __  MMode/2D Measurements & Calculations  IVSd: 1.2 cm  LVIDd: 6.8 cm  LVIDs: 5.2 cm  LVPWd: 1.2 cm  FS: 23.7 %  LV mass(C)d: 382.4 grams  LV mass(C)dI: 135.3 grams/m2  RWT: 0.35                    _____________________________________________________________________________  __        Report approved by: Sofia Nieves 03/08/2021 04:57 PM      Cardiac Catheterization    Narrative    1.  Two-vessel obstructive CAD  2.  Culprit lesion is severe, diffuse mid-distal LAD disease.  3.  There is also a focal 70% stenosis in the RPDA, and a focal, severely   calcified proximal LAD lesion at the takeoff of a large D1.  The proximal   LAD lesion is not flow-limiting (IFR equals 0.98, MLA equals 6.0 mm ).  4.  Successful IVUS and IFR guided PCI of the mid-distal LAD with   overlapping resolute Ryland AYO x2 (3.5 x 34 mm proximally, 0.75 x 38 mm   distally).

## 2021-03-10 NOTE — PROGRESS NOTES
Cardiology Progress Note          Assessment and Plan:         65-year-old gentleman with no significant cardiac history, well-controlled hypertension, underwent loose staple 03/08/2021.  Postprocedure he started experiencing substernal chest pressure with radiation to the back.  Emergent echocardiography demonstrated severely reduced left ventricular systolic function, which prompted coronary angiography yesterday.  He was found to have two-vessel obstructive coronary artery disease with the culprit being severe diffuse mid to distal LAD stenoses.  He underwent successful drug-eluting stent placement to the mid to distal LAD with 2 drug-eluting stents.  A focal 70% stenosis in the RPDA was thought to be more appropriate for medical management.           1. Ischemic cardiomyopathy status post drug-eluting stent placement to the LAD as described above.  2. Residual 70% stenosis in the RPDA, being treated medically at this point  3. New diagnosis of diabetes  4. Hypertension  5. History of right ankle repair with removal of a loose staple during surgery 03/08/2021.     PLAN  - On appropriate DAPT with prasugrel/aspirin.  - Started on carvedilol and Aldactone in addition to losartan for ischemic cardiomyopathy  -Although I will defer the treatment of his diabetes to his primary care physician, I would strongly suggest the use of an SGLT2 inhibitor for its cardioprotective benefits  -Follow-up in cardiology clinic in 2 weeks.  I will see him in follow-up in approximately 2 to 3 months prior to which a cardiovascular MRI will be performed for reassessment of left ventricular systolic function.  -Okay to discharge today from my standpoint.        Interval History:     Feels well.  Denies any chest discomfort or shortness of breath.             Review of Systems:   As per subjective, otherwise 5 systems reviewed and negative.           Physical Exam:   Blood pressure 130/82, pulse 85, temperature 98  F (36.7  C),  "temperature source Oral, resp. rate 16, height 1.88 m (6' 2\"), weight (!) 167.4 kg (369 lb), SpO2 94 %.      Vital Sign Ranges  Temperature Temp  Av  F (36.7  C)  Min: 98  F (36.7  C)  Max: 98  F (36.7  C)   Blood pressure Systolic (24hrs), Av , Min:105 , Max:148        Diastolic (24hrs), Av, Min:68, Max:104      Pulse Pulse  Av.1  Min: 54  Max: 85   Respirations Resp  Av.7  Min: 12  Max: 16   Pulse oximetry SpO2  Av.1 %  Min: 90 %  Max: 100 %         Intake/Output Summary (Last 24 hours) at 3/10/2021 0900  Last data filed at 3/9/2021 1403  Gross per 24 hour   Intake --   Output 875 ml   Net -875 ml       Constitutional: NAD  Skin: Warm and dry  Neck: No JVD  Lungs: CTA  Cardiovascular: RRR, no m/r/g  Abdomen: Soft, non tender.  Extremities and Back: No LE edema.  No hematoma or bruit at access site.  Neurological: Nonfocal           Medications:     I have reviewed this patient's current medications.              Data:     Labs reviewed.     Tele: SAIRA Wu MD, MSTEPHANIE.    "

## 2021-03-10 NOTE — PHARMACY-RX INSURANCE COVERAGE
Antiplatelet coverage check.  Patient has BCBS through an employer.    Brilinta $25/mo   Prasugrel $19/mo   Clopidogrel $4/mo       -CHARLOTTE Bhandari, Pharmacy Technician/Liaison, Discharge Pharmacy [phone]

## 2021-03-10 NOTE — CONSULTS
NUTRITION EDUCATION    REASON FOR ASSESSMENT:  Nutrition education on American Heart Association (AHA) Heart Healthy Diet.    NUTRITION HISTORY:  Information obtained from patient    Pt states he has never heard of the heart healthy diet before and he follows a regular diet at home, his wife typically does the cooking. Diet typically consists of packaged foods, pasta, skinless chicken, beef, pork, spaghetti, tortillas, diet mountain dew and 1% milk. Reports he is willing to substitute ground turkey for ground beef but his wife isn't. Pt states he never uses the salt shaker to add extra salt to his foods.     CURRENT DIET ORDER:  Low SF, 2 g Na    NUTRITION DIAGNOSIS:  Food- and nutrition-related knowledge deficit R/t low SF, 2 g Na diet as evidenced by patient reports he doesn't follow this diet at home.     INTERVENTIONS:  Nutrition Prescription:    Recommended AHA Heart Healthy Diet    Implementation:     Nutrition Education (Content):  a) reviewed Heart Healthy Diet guidelines  b) provided heart healthy diet handout    Nutrition Education (Application):  a) Discussed current eating habits and recommended alternative food choices    Anticipate good compliance    Diet Education - refer to Education flowsheet    Goals:    Patient verbalizes understanding of diet    All of the above goals met during education session    Follow Up/Monitoring:    Provided RD contact information for future questions    Mitzy Pressley  Dietetic Intern

## 2021-03-10 NOTE — PLAN OF CARE
Physical Therapy Discharge Summary    Reason for therapy discharge:    Discharged to home with outpatient therapy.    Progress towards therapy goal(s). See goals on Care Plan in [facility] electronic health record for goal details.  Goals partially met.  Barriers to achieving goals:   discharge from facility.    Therapy recommendation(s):    Continued therapy is recommended.  Rationale/Recommendations:  For further cardiac education and endurance training.

## 2021-03-10 NOTE — PROGRESS NOTES
03/10/21 1000   Quick Adds   Type of Visit Initial PT Evaluation   Living Environment   People in home significant other   Current Living Arrangements house   Home Accessibility stairs to enter home   Number of Stairs, Main Entrance 3   Stair Railings, Main Entrance railings safe and in good condition;railing on right side (ascending)   Transportation Anticipated family or friend will provide   Self-Care   Usual Activity Tolerance good   Current Activity Tolerance moderate   Regular Exercise No   Equipment Currently Used at Home none   Disability/Function   Fall history within last six months no   General Information   Onset of Illness/Injury or Date of Surgery 03/08/21   Referring Physician Dr. Zamora   Patient/Family Therapy Goals Statement (PT) To go home   Pertinent History of Current Problem (include personal factors and/or comorbidities that impact the POC) Pt is a 65 year old male here for a staple removal of right foot, now s/p PCI   Range of Motion (ROM)   ROM Quick Adds ROM WFL   Strength   Manual Muscle Testing Quick Adds Strength WFL   Bed Mobility   Comment (Bed Mobility) Independent   Transfers   Transfer Safety Comments Independent   Gait/Stairs (Locomotion)   Comment (Gait/Stairs) Independent   Balance   Balance Comments Good   Clinical Impression   Criteria for Skilled Therapeutic Intervention yes, treatment indicated   PT Diagnosis (PT) Impaired endurance   Influenced by the following impairments Decreased endurance   Functional limitations due to impairments Difficulty with long distance ambulation   Clinical Presentation Stable/Uncomplicated   Clinical Presentation Rationale VSS, pain controlled   Clinical Decision Making (Complexity) low complexity   Therapy Frequency (PT) 2x/day   Predicted Duration of Therapy Intervention (days/wks) 3 days   Planned Therapy Interventions (PT) patient/family education;home exercise program   Risk & Benefits of therapy have been explained  evaluation/treatment results reviewed;care plan/treatment goals reviewed;risks/benefits reviewed;current/potential barriers reviewed;participants voiced agreement with care plan;participants included;patient   PT Discharge Planning    PT Discharge Recommendation (DC Rec) home with outpatient cardiac rehab   PT Rationale for DC Rec Pt is independent with mobility and safe to discharge home. Pt will benefit from outpatient CR to further increase activity tolerance and for cardiac education.    PT Brief overview of current status  Independent   Total Evaluation Time   Total Evaluation Time (Minutes) 10

## 2021-03-11 ENCOUNTER — TELEPHONE (OUTPATIENT)
Dept: CARDIOLOGY | Facility: CLINIC | Age: 66
End: 2021-03-11

## 2021-03-11 ENCOUNTER — AMBULATORY - HEALTHEAST (OUTPATIENT)
Dept: ADMINISTRATIVE | Facility: REHABILITATION | Age: 66
End: 2021-03-11

## 2021-03-11 DIAGNOSIS — I20.0 UNSTABLE ANGINA (H): ICD-10-CM

## 2021-03-11 LAB — INTERPRETATION ECG - MUSE: NORMAL

## 2021-03-11 NOTE — TELEPHONE ENCOUNTER
Patient was evaluated by cardiology while inpatient for chest pain during staple removal of prior right ankle surgery. PMH: HTN, DM. 3/9/21: Coronary angiogram via RRA successful drug-eluting stent placement to the mid to distal LAD with 2 drug-eluting stents. A focal 70% stenosis in the RPDA was thought to be more appropriate for medical management. Ischemic Cardiomyopathy EF 25-30% with severe hypokinesia. Started on ASA, Lipitor, Coreg, Aldactone, NTG and Effient. Called patient to discuss any post hospital d/c questions, review medication changes, and confirm f/u appts. RN confirmed with patient that he was d/c with an adequate supply of the antiplatelet Effient, and reminded of importance of taking without interruption. Pt has an Rx for PRN SL Nitroglycerin. Patient denied any questions regarding new medications or changes to PTA medications. Patient denied any SOB, chest pain, fever or light headedness. RRA cardiac cath site is without bleeding, swelling, redness or tenderness. RN confirmed with patient that he has an OV scheduled on 3/26/21 at 1330 with MARGE Agata Paula at our [place] Office. cMRI needs to be scheduled for reassessment of left ventricular systolic function. Cardiac rehab has been scheduled. Patient advised to call clinic with any cardiac related questions or concerns prior to this marge't. Patient verbalized understanding and agreed with plan. DEN Gentile RN.

## 2021-03-12 LAB
INTERPRETATION ECG - MUSE: NORMAL
INTERPRETATION ECG - MUSE: NORMAL

## 2021-03-18 PROBLEM — E78.5 DYSLIPIDEMIA: Status: ACTIVE | Noted: 2021-03-18

## 2021-03-18 PROBLEM — E11.9 T2DM (TYPE 2 DIABETES MELLITUS) (H): Status: ACTIVE | Noted: 2021-03-18

## 2021-03-18 PROBLEM — I25.10 CORONARY ARTERY DISEASE INVOLVING NATIVE CORONARY ARTERY OF NATIVE HEART WITHOUT ANGINA PECTORIS: Status: ACTIVE | Noted: 2021-03-18

## 2021-03-18 PROBLEM — I25.5 ISCHEMIC CARDIOMYOPATHY: Status: ACTIVE | Noted: 2021-03-18

## 2021-03-24 ENCOUNTER — AMBULATORY - HEALTHEAST (OUTPATIENT)
Dept: CARDIAC REHAB | Facility: HOSPITAL | Age: 66
End: 2021-03-24

## 2021-03-24 DIAGNOSIS — Z95.5 STENTED CORONARY ARTERY: ICD-10-CM

## 2021-03-26 ENCOUNTER — OFFICE VISIT (OUTPATIENT)
Dept: CARDIOLOGY | Facility: CLINIC | Age: 66
End: 2021-03-26
Payer: COMMERCIAL

## 2021-03-26 VITALS
BODY MASS INDEX: 40.43 KG/M2 | SYSTOLIC BLOOD PRESSURE: 128 MMHG | HEART RATE: 59 BPM | HEIGHT: 74 IN | WEIGHT: 315 LBS | DIASTOLIC BLOOD PRESSURE: 83 MMHG

## 2021-03-26 DIAGNOSIS — I25.110 CORONARY ARTERY DISEASE INVOLVING NATIVE CORONARY ARTERY OF NATIVE HEART WITH UNSTABLE ANGINA PECTORIS (H): ICD-10-CM

## 2021-03-26 DIAGNOSIS — I20.0 UNSTABLE ANGINA (H): ICD-10-CM

## 2021-03-26 LAB
ANION GAP SERPL CALCULATED.3IONS-SCNC: 5 MMOL/L (ref 3–14)
BUN SERPL-MCNC: 16 MG/DL (ref 7–30)
CALCIUM SERPL-MCNC: 9.2 MG/DL (ref 8.5–10.1)
CHLORIDE SERPL-SCNC: 105 MMOL/L (ref 94–109)
CO2 SERPL-SCNC: 28 MMOL/L (ref 20–32)
CREAT SERPL-MCNC: 1.06 MG/DL (ref 0.66–1.25)
GFR SERPL CREATININE-BSD FRML MDRD: 72 ML/MIN/{1.73_M2}
GLUCOSE SERPL-MCNC: 107 MG/DL (ref 70–99)
POTASSIUM SERPL-SCNC: 4.5 MMOL/L (ref 3.4–5.3)
SODIUM SERPL-SCNC: 138 MMOL/L (ref 133–144)

## 2021-03-26 PROCEDURE — 99204 OFFICE O/P NEW MOD 45 MIN: CPT | Performed by: PHYSICIAN ASSISTANT

## 2021-03-26 PROCEDURE — 80048 BASIC METABOLIC PNL TOTAL CA: CPT | Performed by: INTERNAL MEDICINE

## 2021-03-26 PROCEDURE — 36415 COLL VENOUS BLD VENIPUNCTURE: CPT | Performed by: INTERNAL MEDICINE

## 2021-03-26 RX ORDER — METFORMIN HCL 500 MG
500 TABLET, EXTENDED RELEASE 24 HR ORAL
COMMUNITY

## 2021-03-26 ASSESSMENT — MIFFLIN-ST. JEOR: SCORE: 2533.5

## 2021-03-26 NOTE — PATIENT INSTRUCTIONS
Today's Plan:   1) Stop by lab today.   2) Continue with current medications.   3) Call us if you think your chest discomfort continues.   4) Follow up with Dr. Wu in May with heart MRI beforehand.     If you have questions or concerns please call my nurse team at [phone].     Scheduling phone number: [phone]  Reminder: Please bring in all current medications, over the counter supplements and vitamin bottles to your next appointment.    It was a pleasure seeing you today!     Agata Vasquez PA-C  3/26/2021

## 2021-03-26 NOTE — PROGRESS NOTES
Primary Cardiologist: Dr. Wu    Reason for Visit: Hospital Follow up    History of Present Illness:   Paras is a very pleasant 65-year-old male who was admitted recently with progressive chest discomfort.  He ultimately was set up for coronary angiogram where he received AYO placement to the LAD.  He had residual 70% stenosis in the RPDA which was recommended to be intervened upon if patient had recurrent symptoms as an outpatient.  His echocardiogram showed significant decline in his LVEF down to 25 to 30%.  He was started on carvedilol, losartan, and spironolactone for treatment of his ischemic cardiomyopathy.  He was also diagnosed with diabetes and this was treated by the primary hospital team.  He was initiated on prasugrel and aspirin as part of his dual antiplatelet therapy regimen.  He was discharged with close cardiology follow-up and plans for repeat cardiac MRI in 3 months with follow-up with Dr. Wu.    Paras presents to clinic today, stating he is doing well.  He did have several episodes of chest discomfort similar to what he had when he presented to the hospital.  They all occurred while he was sitting.  He tells me when he came to the hospital his symptoms were all at rest as well.  He thinks they lasted about a minute and went away.  He has not tried using nitroglycerin.  He is not sure if they are getting more frequent or more severe.  He otherwise denies orthopnea, shortness of breath, peripheral edema (he has mild peripheral edema bilaterally at baseline due to significant history of orthopedic issues in his ankles), PND, or significant weight gain.  He is informed on importance of low-sodium diet.  We talked about staying less than 2000 mg of sodium per day.  We also talked about having him weigh himself daily.  He does have a weight scale at home.  We talked about what symptoms and signs to watch for.  He has no bleeding issues with his dual antiplatelet regimen.    Assessment and Plan:  Paras  is a very pleasant 65-year-old male who was admitted recently with progressive chest discomfort.     Paras was admitted with progressive chest discomfort and received AYO to his proximal LAD.  He has residual 70% RPDA lesion.  We talked about possibly scheduling him for a stage PCI but he was reluctant to do this.  We ultimately decided that he will continue to monitor his symptoms and if he has recurrent chest discomfort he will contact us and we will schedule him for angiogram. We discussed risks and benefits of left heart catheterization and coronary angiogram were discussed with the patient in detail. 0.1-0.3% (for diagnostic angio) and 1-2% (for PCI)  risk of stroke, MI, death, emergent bypass for diagnostic angio, risk of contrast induced allergic reaction, renal dysfunction, vascular complications were discussed. He has no history of bleeding problems or current bleeding, and no scheduled surgeries or procedures in the next year. Patient understands and wishes to proceed with it.    He has no symptoms to suggest heart failure, or arrhythmia.  We will continue with current medications.  He has not completed his basic metabolic panel today so asked him to stop by lab after our visit.  I will also have him schedule follow-up with Dr. Wu in May with cardiac MRI prior to that visit.    This note was completed in part using Dragon voice recognition software. Although reviewed after completion, some word and grammatical errors may occur.    Orders this Visit:  No orders of the defined types were placed in this encounter.    Orders Placed This Encounter   Medications     metFORMIN (GLUCOPHAGE-XR) 500 MG 24 hr tablet     Sig: Take 500 mg by mouth daily (with dinner)     There are no discontinued medications.      Encounter Diagnosis   Name Primary?     Unstable angina (H)        CURRENT MEDICATIONS:  Current Outpatient Medications   Medication Sig Dispense Refill     amLODIPine (NORVASC) 5 MG tablet Take 5 mg by  mouth daily        aspirin (ASA) 81 MG EC tablet Take 1 tablet (81 mg) by mouth daily 30 tablet 0     atorvastatin (LIPITOR) 80 MG tablet Take 1 tablet (80 mg) by mouth At Bedtime 30 tablet 0     carvedilol (COREG) 3.125 MG tablet Take 1 tablet (3.125 mg) by mouth 2 times daily (with meals) 60 tablet 0     HYDROcodone-acetaminophen (NORCO) 5-325 MG tablet Take 1-2 tablets by mouth every 4 hours as needed for moderate to severe pain 10 tablet 0     losartan (COZAAR) 100 MG tablet Take 100 mg by mouth daily        metFORMIN (GLUCOPHAGE-XR) 500 MG 24 hr tablet Take 500 mg by mouth daily (with dinner)       naproxen sodium (ANAPROX) 220 MG tablet Take 220 mg by mouth 2 times daily as needed for moderate pain       nitroGLYcerin (NITROSTAT) 0.4 MG sublingual tablet For chest pain place 1 tablet under the tongue every 5 minutes for 3 doses. If symptoms persist 5 minutes after 1st dose call 911. 15 tablet 0     ondansetron (ZOFRAN-ODT) 4 MG ODT tab Take 1-2 tablets (4-8 mg) by mouth every 8 hours as needed for nausea 4 tablet 0     prasugrel (EFFIENT) 10 MG TABS tablet Take 1 tablet (10 mg) by mouth daily 30 tablet 0     spironolactone (ALDACTONE) 25 MG tablet Take 0.5 tablets (12.5 mg) by mouth daily (Patient taking differently: Take 25 mg by mouth daily ) 30 tablet 0       ALLERGIES   No Known Allergies    PAST MEDICAL HISTORY:  Past Medical History:   Diagnosis Date     Arthritis     OA of ankle and hip     Colon polyp      Coronary artery disease involving native coronary artery of native heart without angina pectoris 3/18/2021    coronary angiogram with DESx2 to LAD, residual RPDA disease 3/2021      Dyslipidemia 3/18/2021     ED (erectile dysfunction)      Gastro-oesophageal reflux disease      Hip dislocation, right (H)      Hypertension      Ischemic cardiomyopathy 3/18/2021     OA (osteoarthritis) of ankle      Osteoarthritis of hip      Sleep apnea     no cpap     T2DM (type 2 diabetes mellitus) (H) 3/18/2021      Testicular hypofunction        PAST SURGICAL HISTORY:  Past Surgical History:   Procedure Laterality Date     ARTHRODESIS ANKLE  12/9/2013    Procedure: ARTHRODESIS ANKLE;;  Surgeon: Venkata Mccallum MD;  Location: [facility]     ARTHROPLASTY ANKLE  12/9/2013    Procedure: ARTHROPLASTY ANKLE;  RIGHT TOTAL ANKLE ARTHROPLASTY, SUBTALAR FUSION, LIGAMENT REPAIR ([place])^;  Surgeon: Venkata Mccallum MD;  Location: [facility]     ARTHROPLASTY HIP  7/23/2014    Procedure: ARTHROPLASTY HIP;  Surgeon: Cirilo Nassar MD;  Location:  OR     ARTHROPLASTY REVISION HIP Right 11/14/2014    Procedure: ARTHROPLASTY REVISION HIP;  Surgeon: Cirilo Nassar MD;  Location:  OR     ARTHROPLASTY REVISION HIP Right 11/9/2015    Procedure: ARTHROPLASTY REVISION HIP;  Surgeon: José Estrella MD;  Location:  OR     ARTHROPLASTY REVISION HIP Right 2/1/2017    Procedure: ARTHROPLASTY REVISION HIP;  Surgeon: Cirilo Nassar MD;  Location:  OR     CHOLECYSTECTOMY       COLONOSCOPY       CV CORONARY ANGIOGRAM N/A 3/9/2021    Procedure: Coronary Angiogram;  Surgeon: Dilip Zamora MD;  [facility]     CV INSTANTANEOUS WAVE-FREE RATIO N/A 3/9/2021    Procedure: Instantaneous Wave-Free Ratio;  Surgeon: Dilip Zamora MD;  Location:  [facility]     CV INTRAVASULAR ULTRASOUND N/A 3/9/2021    Procedure: Intravascular Ultrasound;  Surgeon: Dilip Zamora MD;  Location:  [facility]     CV PCI STENT DRUG ELUTING N/A 3/9/2021    Procedure: Percutaneous Coronary Intervention Stent Drug Eluting;  Surgeon: Dilip Zamora MD;  Location:  [facility]     REMOVE HARDWARE ANKLE Right 3/8/2021    Procedure: RIGHT ANKLE STAPLE REMOVAL;  Surgeon: Venkata Mccallum MD;  Location:  OR     REPAIR LIGAMENT ANKLE  12/9/2013    Procedure: REPAIR LIGAMENT ANKLE;;  Surgeon: Venkata Mccallum MD;  Location: [facility]       FAMILY  "HISTORY:  Family History   Problem Relation Age of Onset     Heart Failure Mother      Coronary Stenting Mother      Heart Surgery Mother        SOCIAL HISTORY:  Social History     Socioeconomic History     Marital status:      Spouse name: None     Number of children: None     Years of education: None     Highest education level: None   Occupational History     None   Social Needs     Financial resource strain: None     Food insecurity     Worry: None     Inability: None     Transportation needs     Medical: None     Non-medical: None   Tobacco Use     Smoking status: Never Smoker     Smokeless tobacco: Never Used   Substance and Sexual Activity     Alcohol use: Yes     Comment: 1 can beer a week     Drug use: No     Sexual activity: None   Lifestyle     Physical activity     Days per week: None     Minutes per session: None     Stress: None   Relationships     Social connections     Talks on phone: None     Gets together: None     Attends Sikh service: None     Active member of club or organization: None     Attends meetings of clubs or organizations: None     Relationship status: None     Intimate partner violence     Fear of current or ex partner: None     Emotionally abused: None     Physically abused: None     Forced sexual activity: None   Other Topics Concern     None   Social History Narrative     None       Review of Systems:  Skin:  Negative     Eyes:  Positive for glasses  ENT:  Negative    Respiratory:  Positive for dyspnea on exertion  Cardiovascular:    Positive for;chest pain  Gastroenterology: Negative    Genitourinary:  Negative    Musculoskeletal:  Positive for arthritis  Neurologic:  Positive for numbness or tingling of hands(left hand)  Psychiatric:  Negative    Heme/Lymph/Imm:  Negative    Endocrine:  Positive for diabetes    Physical Exam:  Vitals: /83   Pulse 59   Ht 1.88 m (6' 2\")   Wt (!) 168.4 kg (371 lb 3.2 oz)   BMI 47.66 kg/m       GEN:  NAD.  Obese.  NECK: Unable " to assess for JVP.  C/V:  Regular rate and rhythm, no murmur, rub or gallop.  RESP: Clear to auscultation bilaterally without wheezing, rales, or rhonchi.  GI: Abdomen soft, nontender, nondistended. No HSM appreciated.   EXTREM: Trace pitting LE edema bilaterally.   NEURO: Alert and oriented, cooperative. No obvious focal deficits.   PSYCH: Normal affect.  SKIN: Warm and dry.       Recent Lab Results:  LIPID RESULTS:  Lab Results   Component Value Date    CHOL 177 03/09/2021    HDL 46 03/09/2021    LDL 98 03/09/2021    TRIG 167 (H) 03/09/2021       LIVER ENZYME RESULTS:  Lab Results   Component Value Date    AST 22 03/08/2021    ALT 30 03/08/2021       CBC RESULTS:  Lab Results   Component Value Date    WBC 7.3 03/10/2021    RBC 5.12 03/10/2021    HGB 15.1 03/10/2021    HCT 47.7 03/10/2021    MCV 93 03/10/2021    MCH 29.5 03/10/2021    MCHC 31.7 03/10/2021    RDW 12.9 03/10/2021     03/10/2021       BMP RESULTS:  Lab Results   Component Value Date     03/10/2021    POTASSIUM 4.0 03/10/2021    CHLORIDE 103 03/10/2021    CO2 30 03/10/2021    ANIONGAP 4 03/10/2021     (H) 03/10/2021    BUN 12 03/10/2021    CR 0.86 03/10/2021    GFRESTIMATED 90 03/10/2021    GFRESTBLACK >90 03/10/2021    CASE 8.7 03/10/2021        A1C RESULTS:  Lab Results   Component Value Date    A1C 7.0 (H) 03/08/2021       INR RESULTS:  No results found for: INR        Agata Vasquez PA-C  March 26, 2021

## 2021-03-26 NOTE — LETTER
3/26/2021    [facility]  [address]  [facility] [address]    RE: Paras Ramos       Dear Colleague,    I had the pleasure of seeing Paras Ramos in the [facility].    Primary Cardiologist: Dr. Wu    Reason for Visit: Hospital Follow up    History of Present Illness:   Paras is a very pleasant 65-year-old male who was admitted recently with progressive chest discomfort.  He ultimately was set up for coronary angiogram where he received AYO placement to the LAD.  He had residual 70% stenosis in the RPDA which was recommended to be intervened upon if patient had recurrent symptoms as an outpatient.  His echocardiogram showed significant decline in his LVEF down to 25 to 30%.  He was started on carvedilol, losartan, and spironolactone for treatment of his ischemic cardiomyopathy.  He was also diagnosed with diabetes and this was treated by the primary hospital team.  He was initiated on prasugrel and aspirin as part of his dual antiplatelet therapy regimen.  He was discharged with close cardiology follow-up and plans for repeat cardiac MRI in 3 months with follow-up with Dr. Wu.    Paras presents to clinic today, stating he is doing well.  He did have several episodes of chest discomfort similar to what he had when he presented to the hospital.  They all occurred while he was sitting.  He tells me when he came to the hospital his symptoms were all at rest as well.  He thinks they lasted about a minute and went away.  He has not tried using nitroglycerin.  He is not sure if they are getting more frequent or more severe.  He otherwise denies orthopnea, shortness of breath, peripheral edema (he has mild peripheral edema bilaterally at baseline due to significant history of orthopedic issues in his ankles), PND, or significant weight gain.  He is informed on importance of low-sodium diet.  We talked about staying less than 2000 mg of  sodium per day.  We also talked about having him weigh himself daily.  He does have a weight scale at home.  We talked about what symptoms and signs to watch for.  He has no bleeding issues with his dual antiplatelet regimen.    Assessment and Plan:  Paras is a very pleasant 65-year-old male who was admitted recently with progressive chest discomfort.     Paras was admitted with progressive chest discomfort and received AYO to his proximal LAD.  He has residual 70% RPDA lesion.  We talked about possibly scheduling him for a stage PCI but he was reluctant to do this.  We ultimately decided that he will continue to monitor his symptoms and if he has recurrent chest discomfort he will contact us and we will schedule him for angiogram. We discussed risks and benefits of left heart catheterization and coronary angiogram were discussed with the patient in detail. 0.1-0.3% (for diagnostic angio) and 1-2% (for PCI)  risk of stroke, MI, death, emergent bypass for diagnostic angio, risk of contrast induced allergic reaction, renal dysfunction, vascular complications were discussed. He has no history of bleeding problems or current bleeding, and no scheduled surgeries or procedures in the next year. Patient understands and wishes to proceed with it.    He has no symptoms to suggest heart failure, or arrhythmia.  We will continue with current medications.  He has not completed his basic metabolic panel today so asked him to stop by lab after our visit.  I will also have him schedule follow-up with Dr. Wu in May with cardiac MRI prior to that visit.    This note was completed in part using Dragon voice recognition software. Although reviewed after completion, some word and grammatical errors may occur.    Orders this Visit:  No orders of the defined types were placed in this encounter.    Orders Placed This Encounter   Medications     metFORMIN (GLUCOPHAGE-XR) 500 MG 24 hr tablet     Sig: Take 500 mg by mouth daily (with  dinner)     There are no discontinued medications.      Encounter Diagnosis   Name Primary?     Unstable angina (H)        CURRENT MEDICATIONS:  Current Outpatient Medications   Medication Sig Dispense Refill     amLODIPine (NORVASC) 5 MG tablet Take 5 mg by mouth daily        aspirin (ASA) 81 MG EC tablet Take 1 tablet (81 mg) by mouth daily 30 tablet 0     atorvastatin (LIPITOR) 80 MG tablet Take 1 tablet (80 mg) by mouth At Bedtime 30 tablet 0     carvedilol (COREG) 3.125 MG tablet Take 1 tablet (3.125 mg) by mouth 2 times daily (with meals) 60 tablet 0     HYDROcodone-acetaminophen (NORCO) 5-325 MG tablet Take 1-2 tablets by mouth every 4 hours as needed for moderate to severe pain 10 tablet 0     losartan (COZAAR) 100 MG tablet Take 100 mg by mouth daily        metFORMIN (GLUCOPHAGE-XR) 500 MG 24 hr tablet Take 500 mg by mouth daily (with dinner)       naproxen sodium (ANAPROX) 220 MG tablet Take 220 mg by mouth 2 times daily as needed for moderate pain       nitroGLYcerin (NITROSTAT) 0.4 MG sublingual tablet For chest pain place 1 tablet under the tongue every 5 minutes for 3 doses. If symptoms persist 5 minutes after 1st dose call 911. 15 tablet 0     ondansetron (ZOFRAN-ODT) 4 MG ODT tab Take 1-2 tablets (4-8 mg) by mouth every 8 hours as needed for nausea 4 tablet 0     prasugrel (EFFIENT) 10 MG TABS tablet Take 1 tablet (10 mg) by mouth daily 30 tablet 0     spironolactone (ALDACTONE) 25 MG tablet Take 0.5 tablets (12.5 mg) by mouth daily (Patient taking differently: Take 25 mg by mouth daily ) 30 tablet 0       ALLERGIES   No Known Allergies    PAST MEDICAL HISTORY:  Past Medical History:   Diagnosis Date     Arthritis     OA of ankle and hip     Colon polyp      Coronary artery disease involving native coronary artery of native heart without angina pectoris 3/18/2021    coronary angiogram with DESx2 to LAD, residual RPDA disease 3/2021      Dyslipidemia 3/18/2021     ED (erectile dysfunction)       Gastro-oesophageal reflux disease      Hip dislocation, right (H)      Hypertension      Ischemic cardiomyopathy 3/18/2021     OA (osteoarthritis) of ankle      Osteoarthritis of hip      Sleep apnea     no cpap     T2DM (type 2 diabetes mellitus) (H) 3/18/2021     Testicular hypofunction        PAST SURGICAL HISTORY:  Past Surgical History:   Procedure Laterality Date     ARTHRODESIS ANKLE  12/9/2013    Procedure: ARTHRODESIS ANKLE;;  Surgeon: Venkata Mccallum MD;  Location:  [facility]     ARTHROPLASTY ANKLE  12/9/2013    Procedure: ARTHROPLASTY ANKLE;  RIGHT TOTAL ANKLE ARTHROPLASTY, SUBTALAR FUSION, LIGAMENT REPAIR (Tornier VALENCIA)^;  Surgeon: Venkata Mccallum MD;  Location:  [facility]     ARTHROPLASTY HIP  7/23/2014    Procedure: ARTHROPLASTY HIP;  Surgeon: Cirilo Nassar MD;  Location:  [facility]     ARTHROPLASTY REVISION HIP Right 11/14/2014    Procedure: ARTHROPLASTY REVISION HIP;  Surgeon: Cirilo Nassar MD;  Location:  OR     ARTHROPLASTY REVISION HIP Right 11/9/2015    Procedure: ARTHROPLASTY REVISION HIP;  Surgeon: José Estrella MD;  Location:  OR     ARTHROPLASTY REVISION HIP Right 2/1/2017    Procedure: ARTHROPLASTY REVISION HIP;  Surgeon: Cirilo Nassar MD;  Location:  OR     CHOLECYSTECTOMY       COLONOSCOPY       CV CORONARY ANGIOGRAM N/A 3/9/2021    Procedure: Coronary Angiogram;  Surgeon: Dilip Zamora MD;  Location:  HEART CARDIAC CATH LAB     CV INSTANTANEOUS WAVE-FREE RATIO N/A 3/9/2021    Procedure: Instantaneous Wave-Free Ratio;  Surgeon: Dilip Zamora MD;  Location:  HE[facility] CATH LAB     CV INTRAVASULAR ULTRASOUND N/A 3/9/2021    Procedure: Intravascular Ultrasound;  Surgeon: Dilip Zamora MD;  Location:  HE[facility]ARD[facility] CATH LAB     CV PCI STENT DRUG ELUTING N/A 3/9/2021    Procedure: Percutaneous Coronary Intervention Stent Drug Eluting;  Surgeon: Dilip Zamora MD;  Location:  HEART [facility]ARDIAC CATH LAB     REMOVE HARDWARE  ANKLE Right 3/8/2021    Procedure: RIGHT ANKLE STAPLE REMOVAL;  Surgeon: Venkata Mccallum MD;  Location:  OR     REPAIR LIGAMENT ANKLE  12/9/2013    Procedure: REPAIR LIGAMENT ANKLE;;  Surgeon: Venkata Mccallum MD;  Location: [facility]       FAMILY HISTORY:  Family History   Problem Relation Age of Onset     Heart Failure Mother      Coronary Stenting Mother      Heart Surgery Mother        SOCIAL HISTORY:  Social History     Socioeconomic History     Marital status:      Spouse name: None     Number of children: None     Years of education: None     Highest education level: None   Occupational History     None   Social Needs     Financial resource strain: None     Food insecurity     Worry: None     Inability: None     Transportation needs     Medical: None     Non-medical: None   Tobacco Use     Smoking status: Never Smoker     Smokeless tobacco: Never Used   Substance and Sexual Activity     Alcohol use: Yes     Comment: 1 can beer a week     Drug use: No     Sexual activity: None   Lifestyle     Physical activity     Days per week: None     Minutes per session: None     Stress: None   Relationships     Social connections     Talks on phone: None     Gets together: None     Attends Jainism service: None     Active member of club or organization: None     Attends meetings of clubs or organizations: None     Relationship status: None     Intimate partner violence     Fear of current or ex partner: None     Emotionally abused: None     Physically abused: None     Forced sexual activity: None   Other Topics Concern     None   Social History Narrative     None       Review of Systems:  Skin:  Negative     Eyes:  Positive for glasses  ENT:  Negative    Respiratory:  Positive for dyspnea on exertion  Cardiovascular:    Positive for;chest pain  Gastroenterology: Negative    Genitourinary:  Negative    Musculoskeletal:  Positive for arthritis  Neurologic:  Positive for numbness or tingling of hands(left  "hand)  Psychiatric:  Negative    Heme/Lymph/Imm:  Negative    Endocrine:  Positive for diabetes    Physical Exam:  Vitals: /83   Pulse 59   Ht 1.88 m (6' 2\")   Wt (!) 168.4 kg (371 lb 3.2 oz)   BMI 47.66 kg/m       GEN:  NAD.  Obese.  NECK: Unable to assess for JVP.  C/V:  Regular rate and rhythm, no murmur, rub or gallop.  RESP: Clear to auscultation bilaterally without wheezing, rales, or rhonchi.  GI: Abdomen soft, nontender, nondistended. No HSM appreciated.   EXTREM: Trace pitting LE edema bilaterally.   NEURO: Alert and oriented, cooperative. No obvious focal deficits.   PSYCH: Normal affect.  SKIN: Warm and dry.       Recent Lab Results:  LIPID RESULTS:  Lab Results   Component Value Date    CHOL 177 03/09/2021    HDL 46 03/09/2021    LDL 98 03/09/2021    TRIG 167 (H) 03/09/2021       LIVER ENZYME RESULTS:  Lab Results   Component Value Date    AST 22 03/08/2021    ALT 30 03/08/2021       CBC RESULTS:  Lab Results   Component Value Date    WBC 7.3 03/10/2021    RBC 5.12 03/10/2021    HGB 15.1 03/10/2021    HCT 47.7 03/10/2021    MCV 93 03/10/2021    MCH 29.5 03/10/2021    MCHC 31.7 03/10/2021    RDW 12.9 03/10/2021     03/10/2021       BMP RESULTS:  Lab Results   Component Value Date     03/10/2021    POTASSIUM 4.0 03/10/2021    CHLORIDE 103 03/10/2021    CO2 30 03/10/2021    ANIONGAP 4 03/10/2021     (H) 03/10/2021    BUN 12 03/10/2021    CR 0.86 03/10/2021    GFRESTIMATED 90 03/10/2021    GFRESTBLACK >90 03/10/2021    CASE 8.7 03/10/2021        A1C RESULTS:  Lab Results   Component Value Date    A1C 7.0 (H) 03/08/2021       INR RESULTS:  No results found for: INR        Agata Vasquez PA-C  March 26, 2021       cc:   Shagufta Wu MD  [address]  [place] [id number]        "

## 2021-03-29 ENCOUNTER — RECORDS - HEALTHEAST (OUTPATIENT)
Dept: ADMINISTRATIVE | Facility: OTHER | Age: 66
End: 2021-03-29

## 2021-03-31 ENCOUNTER — RECORDS - HEALTHEAST (OUTPATIENT)
Dept: ADMINISTRATIVE | Facility: OTHER | Age: 66
End: 2021-03-31

## 2021-04-02 ENCOUNTER — RECORDS - HEALTHEAST (OUTPATIENT)
Dept: ADMINISTRATIVE | Facility: OTHER | Age: 66
End: 2021-04-02

## 2021-04-05 ENCOUNTER — RECORDS - HEALTHEAST (OUTPATIENT)
Dept: ADMINISTRATIVE | Facility: OTHER | Age: 66
End: 2021-04-05

## 2021-04-07 ENCOUNTER — RECORDS - HEALTHEAST (OUTPATIENT)
Dept: ADMINISTRATIVE | Facility: OTHER | Age: 66
End: 2021-04-07

## 2021-04-09 ENCOUNTER — RECORDS - HEALTHEAST (OUTPATIENT)
Dept: ADMINISTRATIVE | Facility: OTHER | Age: 66
End: 2021-04-09

## 2021-04-09 ENCOUNTER — TELEPHONE (OUTPATIENT)
Dept: CARDIOLOGY | Facility: CLINIC | Age: 66
End: 2021-04-09

## 2021-04-09 DIAGNOSIS — I10 BENIGN ESSENTIAL HYPERTENSION: ICD-10-CM

## 2021-04-09 DIAGNOSIS — I25.110 CORONARY ARTERY DISEASE INVOLVING NATIVE CORONARY ARTERY OF NATIVE HEART WITH UNSTABLE ANGINA PECTORIS (H): ICD-10-CM

## 2021-04-09 RX ORDER — SPIRONOLACTONE 25 MG/1
12.5 TABLET ORAL DAILY
Qty: 30 TABLET | Refills: 3 | Status: SHIPPED | OUTPATIENT
Start: 2021-04-09 | End: 2021-04-13

## 2021-04-09 RX ORDER — PRASUGREL 10 MG/1
10 TABLET, FILM COATED ORAL DAILY
Qty: 30 TABLET | Refills: 3 | Status: SHIPPED | OUTPATIENT
Start: 2021-04-09 | End: 2021-09-09

## 2021-04-09 RX ORDER — ATORVASTATIN CALCIUM 80 MG/1
80 TABLET, FILM COATED ORAL AT BEDTIME
Qty: 30 TABLET | Refills: 3 | Status: SHIPPED | OUTPATIENT
Start: 2021-04-09 | End: 2021-09-09

## 2021-04-09 RX ORDER — CARVEDILOL 3.12 MG/1
3.12 TABLET ORAL 2 TIMES DAILY WITH MEALS
Qty: 60 TABLET | Refills: 3 | Status: SHIPPED | OUTPATIENT
Start: 2021-04-09 | End: 2021-07-08

## 2021-04-09 NOTE — TELEPHONE ENCOUNTER
Voicemail received from patient calling to request refills for his hospital medications as he is nearly out.     Pt was hospitalized in March for chest pain and received two stents to his LAD.     Spoke to patient, states he needs refills on his atorvastatin 80mg daily, carvedilol 3.125mg BID, prasugrel 10mg daily, and spironolactone 25mg daily. He will by ASA 81mg OTC. Refills sent as requested. Discussed with patient that per his medication list, he should be taking 12.5mg daily of spironolactone; pt states he has been taking the full tablet ever since he discharged from the hospital. Last BMP 3/26/21. Routed to Agata Vasquez for review.

## 2021-04-12 ENCOUNTER — RECORDS - HEALTHEAST (OUTPATIENT)
Dept: ADMINISTRATIVE | Facility: OTHER | Age: 66
End: 2021-04-12

## 2021-04-13 RX ORDER — SPIRONOLACTONE 25 MG/1
25 TABLET ORAL DAILY
Qty: 30 TABLET | Refills: 3 | Status: SHIPPED | OUTPATIENT
Start: 2021-04-13 | End: 2021-09-09

## 2021-04-13 NOTE — TELEPHONE ENCOUNTER
Per message Patient called. Confirmed spironolactone dose of 25 mg daily with Patient. Patient states that is the dose he has been taking since discharge.  Refill e scribed to pharmacy.

## 2021-04-14 ENCOUNTER — RECORDS - HEALTHEAST (OUTPATIENT)
Dept: ADMINISTRATIVE | Facility: OTHER | Age: 66
End: 2021-04-14

## 2021-04-16 ENCOUNTER — RECORDS - HEALTHEAST (OUTPATIENT)
Dept: ADMINISTRATIVE | Facility: OTHER | Age: 66
End: 2021-04-16

## 2021-04-19 ENCOUNTER — RECORDS - HEALTHEAST (OUTPATIENT)
Dept: ADMINISTRATIVE | Facility: OTHER | Age: 66
End: 2021-04-19

## 2021-04-21 ENCOUNTER — RECORDS - HEALTHEAST (OUTPATIENT)
Dept: ADMINISTRATIVE | Facility: OTHER | Age: 66
End: 2021-04-21

## 2021-04-23 ENCOUNTER — RECORDS - HEALTHEAST (OUTPATIENT)
Dept: ADMINISTRATIVE | Facility: OTHER | Age: 66
End: 2021-04-23

## 2021-04-26 ENCOUNTER — RECORDS - HEALTHEAST (OUTPATIENT)
Dept: ADMINISTRATIVE | Facility: OTHER | Age: 66
End: 2021-04-26

## 2021-05-03 ENCOUNTER — TRANSFERRED RECORDS (OUTPATIENT)
Dept: HEALTH INFORMATION MANAGEMENT | Facility: CLINIC | Age: 66
End: 2021-05-03

## 2021-05-04 LAB
ALT SERPL-CCNC: 31 U/L (ref 0–55)
ANION GAP SERPL CALCULATED.3IONS-SCNC: ABNORMAL MMOL/L
BUN SERPL-MCNC: 30 MG/DL (ref 7–26)
CALCIUM SERPL-MCNC: 8.6 MG/DL (ref 8.4–10.4)
CHLORIDE SERPLBLD-SCNC: 106 MMOL/L (ref 98–109)
CO2 SERPL-SCNC: 9 MMOL/L (ref 20–29)
CREAT SERPL-MCNC: 0.93 MG/DL (ref 0.73–1.18)
GFR SERPL CREATININE-BSD FRML MDRD: >60 ML/MIN/1.73M2
GLUCOSE SERPL-MCNC: 199 MG/DL (ref 70–99)
POTASSIUM SERPL-SCNC: 4.4 MMOL/L (ref 3.5–5.1)
SODIUM SERPL-SCNC: 138 MMOL/L (ref 136–145)

## 2021-05-18 ENCOUNTER — PRE VISIT (OUTPATIENT)
Dept: CARDIOLOGY | Facility: CLINIC | Age: 66
End: 2021-05-18

## 2021-05-27 ENCOUNTER — TELEPHONE (OUTPATIENT)
Dept: CARDIOLOGY | Facility: CLINIC | Age: 66
End: 2021-05-27

## 2021-05-27 ENCOUNTER — HOSPITAL ENCOUNTER (OUTPATIENT)
Dept: GENERAL RADIOLOGY | Facility: CLINIC | Age: 66
Discharge: HOME | End: 2021-05-27
Attending: INTERNAL MEDICINE | Admitting: INTERNAL MEDICINE
Payer: COMMERCIAL

## 2021-05-27 ENCOUNTER — HOSPITAL ENCOUNTER (EMERGENCY)
Facility: CLINIC | Age: 66
Discharge: HOME | End: 2021-05-27
Attending: EMERGENCY MEDICINE | Admitting: EMERGENCY MEDICINE
Payer: COMMERCIAL

## 2021-05-27 ENCOUNTER — APPOINTMENT (OUTPATIENT)
Dept: CT IMAGING | Facility: CLINIC | Age: 66
End: 2021-05-27
Attending: EMERGENCY MEDICINE
Payer: COMMERCIAL

## 2021-05-27 ENCOUNTER — OFFICE VISIT (OUTPATIENT)
Dept: CARDIOLOGY | Facility: CLINIC | Age: 66
End: 2021-05-27
Payer: COMMERCIAL

## 2021-05-27 VITALS
HEART RATE: 75 BPM | BODY MASS INDEX: 39.17 KG/M2 | SYSTOLIC BLOOD PRESSURE: 113 MMHG | TEMPERATURE: 96.8 F | RESPIRATION RATE: 20 BRPM | DIASTOLIC BLOOD PRESSURE: 84 MMHG | WEIGHT: 315 LBS | HEIGHT: 75 IN | OXYGEN SATURATION: 96 %

## 2021-05-27 VITALS
OXYGEN SATURATION: 98 % | BODY MASS INDEX: 40.43 KG/M2 | HEIGHT: 74 IN | SYSTOLIC BLOOD PRESSURE: 132 MMHG | WEIGHT: 315 LBS | HEART RATE: 90 BPM | DIASTOLIC BLOOD PRESSURE: 83 MMHG

## 2021-05-27 DIAGNOSIS — R79.89 ELEVATED D-DIMER: Primary | ICD-10-CM

## 2021-05-27 DIAGNOSIS — R06.02 SOB (SHORTNESS OF BREATH): ICD-10-CM

## 2021-05-27 DIAGNOSIS — U07.1 CLINICAL DIAGNOSIS OF COVID-19: ICD-10-CM

## 2021-05-27 DIAGNOSIS — R06.02 SHORTNESS OF BREATH: ICD-10-CM

## 2021-05-27 DIAGNOSIS — R09.89: ICD-10-CM

## 2021-05-27 DIAGNOSIS — R06.02 SOB (SHORTNESS OF BREATH): Primary | ICD-10-CM

## 2021-05-27 LAB
ALBUMIN SERPL-MCNC: 3.4 G/DL (ref 3.4–5)
ALP SERPL-CCNC: 68 U/L (ref 40–150)
ALT SERPL W P-5'-P-CCNC: 32 U/L (ref 0–70)
ANION GAP SERPL CALCULATED.3IONS-SCNC: 5 MMOL/L (ref 3–14)
AST SERPL W P-5'-P-CCNC: 18 U/L (ref 0–45)
BASOPHILS # BLD AUTO: 0 10E9/L (ref 0–0.2)
BASOPHILS NFR BLD AUTO: 0.4 %
BILIRUB SERPL-MCNC: 0.6 MG/DL (ref 0.2–1.3)
BUN SERPL-MCNC: 12 MG/DL (ref 7–30)
CALCIUM SERPL-MCNC: 9.2 MG/DL (ref 8.5–10.1)
CHLORIDE SERPL-SCNC: 107 MMOL/L (ref 94–109)
CO2 SERPL-SCNC: 28 MMOL/L (ref 20–32)
CREAT SERPL-MCNC: 0.95 MG/DL (ref 0.66–1.25)
D DIMER PPP FEU-MCNC: 0.8 UG/ML FEU (ref 0–0.5)
DIFFERENTIAL METHOD BLD: ABNORMAL
EOSINOPHIL # BLD AUTO: 0.2 10E9/L (ref 0–0.7)
EOSINOPHIL NFR BLD AUTO: 1.9 %
ERYTHROCYTE [DISTWIDTH] IN BLOOD BY AUTOMATED COUNT: 14.2 % (ref 10–15)
GFR SERPL CREATININE-BSD FRML MDRD: 83 ML/MIN/{1.73_M2}
GLUCOSE SERPL-MCNC: 123 MG/DL (ref 70–99)
HCT VFR BLD AUTO: 44.5 % (ref 40–53)
HGB BLD-MCNC: 13.9 G/DL (ref 13.3–17.7)
IMM GRANULOCYTES # BLD: 0 10E9/L (ref 0–0.4)
IMM GRANULOCYTES NFR BLD: 0.5 %
LYMPHOCYTES # BLD AUTO: 1.2 10E9/L (ref 0.8–5.3)
LYMPHOCYTES NFR BLD AUTO: 14.5 %
MCH RBC QN AUTO: 29.3 PG (ref 26.5–33)
MCHC RBC AUTO-ENTMCNC: 31.2 G/DL (ref 31.5–36.5)
MCV RBC AUTO: 94 FL (ref 78–100)
MONOCYTES # BLD AUTO: 0.7 10E9/L (ref 0–1.3)
MONOCYTES NFR BLD AUTO: 7.9 %
NEUTROPHILS # BLD AUTO: 6.4 10E9/L (ref 1.6–8.3)
NEUTROPHILS NFR BLD AUTO: 74.8 %
NRBC # BLD AUTO: 0 10*3/UL
NRBC BLD AUTO-RTO: 0 /100
NT-PROBNP SERPL-MCNC: 279 PG/ML (ref 0–125)
PLATELET # BLD AUTO: 218 10E9/L (ref 150–450)
POTASSIUM SERPL-SCNC: 4.1 MMOL/L (ref 3.4–5.3)
PROT SERPL-MCNC: 7 G/DL (ref 6.8–8.8)
RBC # BLD AUTO: 4.74 10E12/L (ref 4.4–5.9)
SODIUM SERPL-SCNC: 140 MMOL/L (ref 133–144)
TROPONIN I SERPL-MCNC: <0.015 UG/L (ref 0–0.04)
WBC # BLD AUTO: 8.5 10E9/L (ref 4–11)

## 2021-05-27 PROCEDURE — 36415 COLL VENOUS BLD VENIPUNCTURE: CPT | Performed by: INTERNAL MEDICINE

## 2021-05-27 PROCEDURE — 99285 EMERGENCY DEPT VISIT HI MDM: CPT | Mod: 25

## 2021-05-27 PROCEDURE — 85379 FIBRIN DEGRADATION QUANT: CPT | Performed by: INTERNAL MEDICINE

## 2021-05-27 PROCEDURE — 250N000011 HC RX IP 250 OP 636: Performed by: EMERGENCY MEDICINE

## 2021-05-27 PROCEDURE — 83880 ASSAY OF NATRIURETIC PEPTIDE: CPT | Performed by: INTERNAL MEDICINE

## 2021-05-27 PROCEDURE — 84484 ASSAY OF TROPONIN QUANT: CPT | Performed by: EMERGENCY MEDICINE

## 2021-05-27 PROCEDURE — 80053 COMPREHEN METABOLIC PANEL: CPT | Performed by: INTERNAL MEDICINE

## 2021-05-27 PROCEDURE — 71046 X-RAY EXAM CHEST 2 VIEWS: CPT

## 2021-05-27 PROCEDURE — 250N000009 HC RX 250: Performed by: EMERGENCY MEDICINE

## 2021-05-27 PROCEDURE — 93000 ELECTROCARDIOGRAM COMPLETE: CPT | Performed by: INTERNAL MEDICINE

## 2021-05-27 PROCEDURE — 71275 CT ANGIOGRAPHY CHEST: CPT

## 2021-05-27 PROCEDURE — 99214 OFFICE O/P EST MOD 30 MIN: CPT | Performed by: INTERNAL MEDICINE

## 2021-05-27 PROCEDURE — 85025 COMPLETE CBC W/AUTO DIFF WBC: CPT | Performed by: INTERNAL MEDICINE

## 2021-05-27 RX ORDER — IOPAMIDOL 755 MG/ML
90 INJECTION, SOLUTION INTRAVASCULAR ONCE
Status: COMPLETED | OUTPATIENT
Start: 2021-05-27 | End: 2021-05-27

## 2021-05-27 RX ADMIN — IOPAMIDOL 90 ML: 755 INJECTION, SOLUTION INTRAVENOUS at 16:25

## 2021-05-27 RX ADMIN — SODIUM CHLORIDE 100 ML: 9 INJECTION, SOLUTION INTRAVENOUS at 16:25

## 2021-05-27 ASSESSMENT — ENCOUNTER SYMPTOMS
DYSURIA: 0
DIZZINESS: 1
FEVER: 0
FATIGUE: 1
DIARRHEA: 0
VOMITING: 0
SHORTNESS OF BREATH: 1

## 2021-05-27 ASSESSMENT — MIFFLIN-ST. JEOR
SCORE: 2421.47
SCORE: 2414.66

## 2021-05-27 NOTE — DISCHARGE INSTRUCTIONS
Please come back to the hospital needed with any worsening symptoms.  Your CT scan is normal, though does still show some inflammation from your old coronavirus infection, and I do think that that is why you are still so short of breath.  You are also high risk given your cardiac function having been diminished, so be absolutely certain to follow with your regular doctor either tomorrow, or after the holiday weekend.  Come back to the emergency room immediately if there are any worsening symptoms you have, if you are unable to do your normal activities of daily living, otherwise please try and take it easy, rest, and ask for help whenever possible.  Come back to the ER with any changes or deterioration in your respiratory status.  You have no blood clot today.

## 2021-05-27 NOTE — PROGRESS NOTES
Service Date: 05/27/2021    CARDIOLOGY FOLLOWUP     HISTORY OF PRESENT ILLNESS:  I had the pleasure of seeing Mr. Ramos in consultation at the [facility] today.  He is a very pleasant, 65-year-old gentleman whom I saw as an inpatient at [facility] in March of this year.    The patient had undergone removal of a loose staple post right ankle repair and postoperatively started experiencing substernal chest discomfort.  At the time I saw him, I ordered an echocardiogram, which demonstrated severe left ventricular dysfunction with an ejection fraction of 25%-30%.  This appeared to be global in nature, but given these findings, I recommended a coronary angiography, which was performed on 03/09/2021.      The patient was found to have 2 vessel obstructive coronary artery disease with a diffuse mid to distal LAD stenosis that was revascularized with 2 drug-eluting stents.  He also was noted to have a focal 70% stenosis in the RPDA, which was thought to be appropriate for medical therapy initially.  He was also initiated on appropriate medical therapy for his cardiomyopathy.    When the patient was seen in followup in late March of this year, he was actually doing better overall from a cardiovascular standpoint.  He did report some chest discomfort at the time, and revascularization of the RPDA was again discussed, but ultimately not performed.      Unfortunately, he was hospitalized at [facility] in early May of this year with COVID-19 pneumonia.  Essentially, he developed fevers/ache/malaise with progressive dyspnea.  He tested positive for COVID-19 and was actually treated with remdesivir and dexamethasone.  He also required supplemental oxygen, which was fortunately weaned off prior to discharge.      Unfortunately, the patient has noted persistent dyspnea on exertion since then.  He states that anything more than a low level of exertion triggers significant dyspnea.  He also  complains of dizziness after bending over.  He denies any chest discomfort or palpitations.  He denies any syncope or presyncope.  He has mild lower extremity edema, but this has not changed significantly since March.  Interestingly, he has actually lost about 20 pounds since then.      He remains fully compliant with his medical therapy.    PHYSICAL EXAMINATION:  Dictated below.    He is scheduled for a stress cardiovascular MRI early next week.    I personally reviewed his EKG obtained today, which demonstrated sinus rhythm with left axis deviation and an intraventricular conduction delay.    IMPRESSION:    1.  Coronary artery disease, status post drug-eluting stent placement to the LAD as described above in 03/2021.  2.  Residual moderate RPDA stenosis, which was not intervened on at the time of his initial angiogram.  3.  Presumed ischemic cardiomyopathy.  4.  Recent COVID-19 pneumonia.  5.  Dyspnea on exertion and generalized fatigue since his recent hospitalization in early May of this year.  6.  Diabetes, type 2.    The patient presents for followup after a recent hospitalization at [facility] for COVID-19 pneumonia.  Subsequently, he has noted persistent dyspnea on exertion and dizziness after standing up from a bending position.    As we discussed, the differential diagnosis at this time would include persistent symptoms post COVID-19 infection versus a cardiac etiology, suggesting congestive heart failure or ischemia.    On physical exam, he does not appear to be profoundly fluid overloaded, and he has actually lost weight since March, but this is still a possibility given his severe left ventricular dysfunction.  Progressive ischemia is unlikely given that he is not really experiencing anginal symptoms.      At this point in time, I have ordered a chest x-ray as well as a BNP and D-dimer to help clarify the diagnosis.  In addition, he is scheduled for a stress cardiovascular MRI next week, which  will be helpful in reassessing the degree of left ventricular functional recovery, as well as to evaluate the hemodynamic significance of his RPDA lesion.  Depending on the results, further intensification of his medical therapy may also be indicated.  Specifically, I do think that up titration of carvedilol as well as the possible addition of an SGLT2 inhibitor and a transition from losartan to Entresto could all be considered.      It was a pleasure seeing him in followup today.    Shagufta Wu MD        D: 2021   T: 2021   MT: [email or web address]    Name:     LARA FREYMedina  MRN:      [id number]        Account:      [id number]   :      1955           Service Date: 2021       Document: [id number]

## 2021-05-27 NOTE — TELEPHONE ENCOUNTER
Called patient to review finding of elevated D dimer and Dr. Wu's recommendation to check a CT chest for PE.  Patient states he was leaving in 20 [place]. He is not much interested in staying to have another test. Asked patient to wait to leave until scheduling can check available times.    Scheduling team working on an appointment - nothing on 3rd floor today - checking hospital availability.  Per staff - there are no available spots in [place] or [facility] today.  Patient has been scheduled for 6/2/2021 @ 7:45 for the CT and then can go for his stress MRI same day.    Will message and page Dr. Wu with update

## 2021-05-27 NOTE — LETTER
5/27/2021    [facility]  [address]  [facility] [address]    RE: Paras Ramos       Dear Colleague,    I had the pleasure of seeing Paras Ramos in the [facility].    HPI and Plan:   See dictation    No orders of the defined types were placed in this encounter.      No orders of the defined types were placed in this encounter.      There are no discontinued medications.      No diagnosis found.    CURRENT MEDICATIONS:  Current Outpatient Medications   Medication Sig Dispense Refill     amLODIPine (NORVASC) 5 MG tablet Take 5 mg by mouth daily        aspirin (ASA) 81 MG EC tablet Take 1 tablet (81 mg) by mouth daily 30 tablet 0     atorvastatin (LIPITOR) 80 MG tablet Take 1 tablet (80 mg) by mouth At Bedtime 30 tablet 3     carvedilol (COREG) 3.125 MG tablet Take 1 tablet (3.125 mg) by mouth 2 times daily (with meals) 60 tablet 3     HYDROcodone-acetaminophen (NORCO) 5-325 MG tablet Take 1-2 tablets by mouth every 4 hours as needed for moderate to severe pain 10 tablet 0     losartan (COZAAR) 100 MG tablet Take 100 mg by mouth daily        metFORMIN (GLUCOPHAGE-XR) 500 MG 24 hr tablet Take 500 mg by mouth daily (with dinner)       naproxen sodium (ANAPROX) 220 MG tablet Take 220 mg by mouth 2 times daily as needed for moderate pain       nitroGLYcerin (NITROSTAT) 0.4 MG sublingual tablet For chest pain place 1 tablet under the tongue every 5 minutes for 3 doses. If symptoms persist 5 minutes after 1st dose call 911. 15 tablet 0     ondansetron (ZOFRAN-ODT) 4 MG ODT tab Take 1-2 tablets (4-8 mg) by mouth every 8 hours as needed for nausea 4 tablet 0     prasugrel (EFFIENT) 10 MG TABS tablet Take 1 tablet (10 mg) by mouth daily 30 tablet 3     spironolactone (ALDACTONE) 25 MG tablet Take 1 tablet (25 mg) by mouth daily 30 tablet 3       ALLERGIES   No Known Allergies    PAST MEDICAL HISTORY:  Past Medical History:   Diagnosis Date      Arthritis     OA of ankle and hip     Colon polyp      Coronary artery disease involving native coronary artery of native heart without angina pectoris 3/18/2021    coronary angiogram with DESx2 to LAD, residual RPDA disease 3/2021      Dyslipidemia 3/18/2021     ED (erectile dysfunction)      Gastro-oesophageal reflux disease      Hip dislocation, right (H)      Hypertension      Ischemic cardiomyopathy 3/18/2021     OA (osteoarthritis) of ankle      Osteoarthritis of hip      Sleep apnea     no cpap     T2DM (type 2 diabetes mellitus) (H) 3/18/2021     Testicular hypofunction        PAST SURGICAL HISTORY:  Past Surgical History:   Procedure Laterality Date     ARTHRODESIS ANKLE  12/9/2013    Procedure: ARTHRODESIS ANKLE;;  Surgeon: Venkata Mccallum MD;  Location:  [facility]     ARTHROPLASTY ANKLE  12/9/2013    Procedure: ARTHROPLASTY ANKLE;  RIGHT TOTAL ANKLE ARTHROPLASTY, SUBTALAR FUSION, LIGAMENT REPAIR (Tornier VALENCIA)^;  Surgeon: Venkata Mccallum MD;  Location:  [facility]     ARTHROPLASTY HIP  7/23/2014    Procedure: ARTHROPLASTY HIP;  Surgeon: Cirilo Nassar MD;  Location:  OR     ARTHROPLASTY REVISION HIP Right 11/14/2014    Procedure: ARTHROPLASTY REVISION HIP;  Surgeon: Cirilo Nassar MD;  Location:  OR     ARTHROPLASTY REVISION HIP Right 11/9/2015    Procedure: ARTHROPLASTY REVISION HIP;  Surgeon: José Estrella MD;  Location:  OR     ARTHROPLASTY REVISION HIP Right 2/1/2017    Procedure: ARTHROPLASTY REVISION HIP;  Surgeon: Cirilo Nassar MD;  Location:  OR     CHOLECYSTECTOMY       COLONOSCOPY       CV CORONARY ANGIOGRAM N/A 3/9/2021    Procedure: Coronary Angiogram;  Surgeon: Dilip Zamora MD;  Location:  [facility]ATH [facility]     CV INSTANTANEOUS WAVE-FREE RATIO N/A 3/9/2021    Procedure: Instantaneous Wave-Free Ratio;  Surgeon: Dilip Zamora MD;  Location:  HE[facility]     CV INTRAVASULAR ULTRASOUND N/A 3/9/2021    Procedure:  Intravascular Ultrasound;  Surgeon: Dilip Zamora MD;  Location:  [facility]ATH [facility]     CV PCI STENT DRUG ELUTING N/A 3/9/2021    Procedure: Percutaneous Coronary Intervention Stent Drug Eluting;  Surgeon: Dilip Zamora MD;  Location:  HEART [facility]ARD[facility] CATH [facility]     REMOVE HARDWARE ANKLE Right 3/8/2021    Procedure: RIGHT ANKLE STAPLE REMOVAL;  Surgeon: Venkata Mccallum MD;  Location:  OR     REPAIR LIGAMENT ANKLE  12/9/2013    Procedure: REPAIR LIGAMENT ANKLE;;  Surgeon: Venkata Mccallum MD;  Location: [facility]       FAMILY HISTORY:  Family History   Problem Relation Age of Onset     Heart Failure Mother      Coronary Stenting Mother      Heart Surgery Mother        SOCIAL HISTORY:  Social History     Socioeconomic History     Marital status:      Spouse name: Not on file     Number of children: Not on file     Years of education: Not on file     Highest education level: Not on file   Occupational History     Not on file   Social Needs     Financial resource strain: Not on file     Food insecurity     Worry: Not on file     Inability: Not on file     Transportation needs     Medical: Not on file     Non-medical: Not on file   Tobacco Use     Smoking status: Never Smoker     Smokeless tobacco: Never Used   Substance and Sexual Activity     Alcohol use: Yes     Comment: 1 can beer a week     Drug use: No     Sexual activity: Not on file   Lifestyle     Physical activity     Days per week: Not on file     Minutes per session: Not on file     Stress: Not on file   Relationships     Social connections     Talks on phone: Not on file     Gets together: Not on file     Attends Shinto service: Not on file     Active member of club or organization: Not on file     Attends meetings of clubs or organizations: Not on file     Relationship status: Not on file     Intimate partner violence     Fear of current or ex partner: Not on file     Emotionally abused: Not on file     Physically  abused: Not on file     Forced sexual activity: Not on file   Other Topics Concern     Not on file   Social History Narrative     Not on file       Review of Systems:  Skin:          Eyes:         ENT:         Respiratory:          Cardiovascular:         Gastroenterology:        Genitourinary:         Musculoskeletal:         Neurologic:         Psychiatric:         Heme/Lymph/Imm:         Endocrine:           Physical Exam:  Vitals: There were no vitals taken for this visit.    Constitutional:  cooperative, alert and oriented, well developed, well nourished, in no acute distress        Skin:  warm and dry to the touch, no apparent skin lesions or masses noted          Head:           Eyes:  pupils equal and round        Lymph:      ENT:           Neck:  JVP normal        Respiratory:  clear to auscultation         Cardiac: regular rhythm                                                         GI:           Extremities and Muscular Skeletal:                 Neurological:  no gross motor deficits        Psych:  Alert and Oriented x 3      Thank you for allowing me to participate in the care of your patient.      Sincerely,     Shagufta Wu MD     [facility]    cc:   No referring provider defined for this encounter.

## 2021-05-27 NOTE — TELEPHONE ENCOUNTER
BNP/D-dimer/CBC/CMP and chest Xray done after visit today, see below. Pt had COVID-19 in April but presented with persistent MANTILLA and dizziness per note today. MRI scheduled for 6/2. Routed to Dr Wu.     BNP = 279  D-dimer = 0.8  CBC = WBC 8.5, Hgb 13.9, Plt 218  CMP = K 4.1, Na 140, Creat 0.95, BUN 12, GFR 83. Liver enzymes WNL     IMPRESSION: No pleural fluid or pneumothorax. No edema. Subtle heterogeneous opacities in the mid and lower lungs could be an atypical infectious process. COVID 19 could have this appearance.  Normal size of the heart. There are degenerative changes in the spine.

## 2021-05-27 NOTE — ED TRIAGE NOTES
SOB today, went to cardiologist today, labs drawn, abnormal results (including elevated d-dimer), sent to ED for further testing. Was COVID+ around 4/24.

## 2021-05-27 NOTE — ED PROVIDER NOTES
History   Chief Complaint:  Shortness of Breath and Fatigue     The history is provided by the patient.      Paras Ramos is a 65 year old male with history of hypertension and CAD who presents with shortness of breath. Paras was advised by his cardiologist to visit the ED for further testing after receiving abnormal labs (elevated ddimer). He reports experiencing shortness of breath, fatigue, and dizziness when bending over. He denies experiencing symptoms of fever, vomiting, diarrhea, and dysuria. He reports his doctor informed him his heart is function at 25% via a stress test and he subsequently had stents placed. He has an appointment with a cardiologist next week to repeat a stress test following the stent placement.     Review of Systems   Constitutional: Positive for fatigue. Negative for fever.   Respiratory: Positive for shortness of breath.    Gastrointestinal: Negative for diarrhea and vomiting.   Genitourinary: Negative for dysuria.   Neurological: Positive for dizziness.   All other systems reviewed and are negative.      Allergies:  The patient has no known allergies.       Medications:  Amlodipine  Aspirin 81 mg  Atorvastatin  Carvedilol  Hydrocodone-acetaminophen  Losartan  Metformin  Prasugrel  Spironolactone    Past Medical History:    Arthritis  Colon polyp  CAD  Dyslipidemia  ED  GERD  Hip dislocation, right  Hypertension  Ischemic cardiomyopathy  Osteoarthritis, ankle, hip  Sleep apnea  Type 2 diabetes mellitus  Morbid obesity  Infection of right prosthetic hip joint       Past Surgical History:    Arthrodesis ankle  Arthroplasty, ankle  Arthroplasty, hip  Arthroplasty revision, hip x 2  Cholecystectomy  Coronary angiogram  C instantaneous wave-free ratio  CV PCI stent drug eluting  Remove hardware ankle  Repair ligament ankle x 2    Family History:    Heart failure, mother  Coronary stenting, mother  Heart surgery, mother    Social History:  The patient is unaccompanied today in ED.  "    Physical Exam     Patient Vitals for the past 24 hrs:   BP Temp Temp src Pulse Resp SpO2 Height Weight   05/27/21 1729 113/84 -- -- 75 20 96 % -- --   05/27/21 1533 116/78 96.8  F (36  C) Temporal 75 18 98 % 1.905 m (6' 3\") (!) 155.6 kg (343 lb)       Physical Exam  Vitals: reviewed by me  General: Pt seen on hospital [facility], pleasant, cooperative, and alert to conversation  Eyes: Tracking well, clear conjunctiva BL  ENT: MMM, midline trachea.   Lungs: No tachypnea, no accessory muscle use. No respiratory distress.   CV: Rate as above  Abd: Soft, non tender, no guarding, no rebound. Non distended  MSK: no joint effusion.  No evidence of trauma  Skin: No rash  Neuro: Clear speech and no facial droop.  Psych: Not RIS, no e/o AH/VH      Emergency Department Course     Imaging:    CT Chest Pulmonary Embolism w Contrast  Preliminary Result  IMPRESSION:  1.  New finding of multifocal bilateral groundglass nodular opacities  worrisome for an atypical pneumonia such as COVID-19 pneumonia versus  lung inflammatory disease.  2.  No evidence for pulmonary embolism.  Per radiology    Laboratory:  Troponin (Collected 1708): <0.015    Emergency Department Course:    Reviewed:  1604 I reviewed nursing notes, vitals, past medical history and care everywhere    Assessments:  1608 I obtained history and examined the patient as noted above.   1757 I rechecked the patient and explained findings.     Disposition:  The patient was discharged to home.       Impression & Plan       Medical Decision Making:  Richard is a very pleasant 65 year old who presents to the emergency room after an elevated d dimer in clinic. Thankfully the CT scan of his chest is negative for PE, and I do think the reason that he is fatigued is he has some significant scar tissue still from when he tested positive for COVID roughly 27 days ago. CMP was normal, troponin was normal at clinic, and the delta troponin was normal here. He has no chest pain, simply " talks about fatigue overall, which would fit with the lung changes we are seeing on the CT scan. He also has a decreased ejection fraction, and is being managed with his regular doctor for this. We'll plan to discharge home with very clear return precautions and primary care follow up. Patient is okay with this plan. We'll also note no evidence of sepsis, pneumonia, pneumothorax, ACS, or any other type of emergent cardiopulmonary abnormality noted on exam workup here today.     Diagnosis:    ICD-10-CM    1. Shortness of breath  R06.02    2. Alteration in pulmonary status  R09.89    3. Clinical diagnosis of COVID-19  U07.1        Discharge Medications:  Discharge Medication List as of 5/27/2021  5:27 PM          Scribe Disclosure:  I, Grzegorz García and Lesly Finley, am serving as a scribe at 4:08 PM on 5/27/2021 to document services personally performed by Cliff Wilkerson MD based on my observations and the provider's statements to me.          Cliff Wilkerson MD  05/27/21 2042

## 2021-05-27 NOTE — TELEPHONE ENCOUNTER
Spoke with Dr. Wu: If CT can't be done out patient today then patient should be seen in ED to have this done.    Called patient and he agreed to go today and have ED assessment and CT chest follow up.    Dr. Wu updated.

## 2021-06-02 ENCOUNTER — HOSPITAL ENCOUNTER (OUTPATIENT)
Dept: CARDIOLOGY | Facility: CLINIC | Age: 66
Discharge: HOME | End: 2021-06-02
Attending: INTERNAL MEDICINE | Admitting: INTERNAL MEDICINE
Payer: COMMERCIAL

## 2021-06-02 ENCOUNTER — TELEPHONE (OUTPATIENT)
Dept: CARDIOLOGY | Facility: CLINIC | Age: 66
End: 2021-06-02

## 2021-06-02 VITALS — OXYGEN SATURATION: 97 % | DIASTOLIC BLOOD PRESSURE: 109 MMHG | SYSTOLIC BLOOD PRESSURE: 129 MMHG | HEART RATE: 79 BPM

## 2021-06-02 DIAGNOSIS — I20.0 UNSTABLE ANGINA (H): ICD-10-CM

## 2021-06-02 PROCEDURE — 255N000002 HC RX 255 OP 636: Performed by: INTERNAL MEDICINE

## 2021-06-02 PROCEDURE — 250N000011 HC RX IP 250 OP 636: Performed by: INTERNAL MEDICINE

## 2021-06-02 PROCEDURE — 75563 CARD MRI W/STRESS IMG & DYE: CPT | Mod: 26 | Performed by: INTERNAL MEDICINE

## 2021-06-02 PROCEDURE — A9585 GADOBUTROL INJECTION: HCPCS | Performed by: INTERNAL MEDICINE

## 2021-06-02 PROCEDURE — 93016 CV STRESS TEST SUPVJ ONLY: CPT | Performed by: INTERNAL MEDICINE

## 2021-06-02 PROCEDURE — 93018 CV STRESS TEST I&R ONLY: CPT | Performed by: INTERNAL MEDICINE

## 2021-06-02 PROCEDURE — 75563 CARD MRI W/STRESS IMG & DYE: CPT

## 2021-06-02 RX ORDER — ONDANSETRON 2 MG/ML
4 INJECTION INTRAMUSCULAR; INTRAVENOUS
Status: DISCONTINUED | OUTPATIENT
Start: 2021-06-02 | End: 2021-06-03 | Stop reason: HOSPADM

## 2021-06-02 RX ORDER — ALBUTEROL SULFATE 90 UG/1
2 AEROSOL, METERED RESPIRATORY (INHALATION) EVERY 5 MIN PRN
Status: DISCONTINUED | OUTPATIENT
Start: 2021-06-02 | End: 2021-06-03 | Stop reason: HOSPADM

## 2021-06-02 RX ORDER — AMINOPHYLLINE 25 MG/ML
100 INJECTION, SOLUTION INTRAVENOUS ONCE
Status: DISCONTINUED | OUTPATIENT
Start: 2021-06-02 | End: 2021-06-03 | Stop reason: HOSPADM

## 2021-06-02 RX ORDER — DIPHENHYDRAMINE HYDROCHLORIDE 50 MG/ML
25-50 INJECTION INTRAMUSCULAR; INTRAVENOUS
Status: DISCONTINUED | OUTPATIENT
Start: 2021-06-02 | End: 2021-06-03 | Stop reason: HOSPADM

## 2021-06-02 RX ORDER — METHYLPREDNISOLONE SODIUM SUCCINATE 125 MG/2ML
125 INJECTION, POWDER, LYOPHILIZED, FOR SOLUTION INTRAMUSCULAR; INTRAVENOUS
Status: DISCONTINUED | OUTPATIENT
Start: 2021-06-02 | End: 2021-06-03 | Stop reason: HOSPADM

## 2021-06-02 RX ORDER — REGADENOSON 0.08 MG/ML
0.4 INJECTION, SOLUTION INTRAVENOUS ONCE
Status: COMPLETED | OUTPATIENT
Start: 2021-06-02 | End: 2021-06-02

## 2021-06-02 RX ORDER — ACYCLOVIR 200 MG/1
0-1 CAPSULE ORAL
Status: DISCONTINUED | OUTPATIENT
Start: 2021-06-02 | End: 2021-06-03 | Stop reason: HOSPADM

## 2021-06-02 RX ORDER — DIAZEPAM 5 MG
5 TABLET ORAL EVERY 30 MIN PRN
Status: DISCONTINUED | OUTPATIENT
Start: 2021-06-02 | End: 2021-06-03 | Stop reason: HOSPADM

## 2021-06-02 RX ORDER — DIPHENHYDRAMINE HCL 25 MG
25 CAPSULE ORAL
Status: DISCONTINUED | OUTPATIENT
Start: 2021-06-02 | End: 2021-06-03 | Stop reason: HOSPADM

## 2021-06-02 RX ORDER — CAFFEINE CITRATE 20 MG/ML
60 SOLUTION INTRAVENOUS
Status: DISCONTINUED | OUTPATIENT
Start: 2021-06-02 | End: 2021-06-03 | Stop reason: HOSPADM

## 2021-06-02 RX ORDER — GADOBUTROL 604.72 MG/ML
40 INJECTION INTRAVENOUS ONCE
Status: COMPLETED | OUTPATIENT
Start: 2021-06-02 | End: 2021-06-02

## 2021-06-02 RX ADMIN — REGADENOSON 0.4 MG: 0.08 INJECTION, SOLUTION INTRAVENOUS at 11:03

## 2021-06-02 RX ADMIN — GADOBUTROL 40 ML: 604.72 INJECTION INTRAVENOUS at 11:40

## 2021-06-02 NOTE — TELEPHONE ENCOUNTER
"Stress MRI 6/2/2021 noted. Order to follow c/o dyspnea post CAD and stenting in March + COVID-19 pneumonia in April.  Next OV is 7/8/2021 with Dr. Wu    Stress MRI: Mildly dilated left ventricle with moderately reduced systolic function and regional wall  motion abnormalities as described above. No ischemia on stress perfusion imaging. Late gadolinium enhancement imaging demonstrates subendocardial enhancement involving the mid to basal  anterolateral wall as well as the basal inferolateral wall. These findings are consistent with a prior  myocardial infarction in the circumflex distribution.     Patient reported to the ED for assessment on 5/27/2021 per Dr. Wu's recommendation. D dimer was elevated to 0.8.  CT chest:  1.  New finding of multifocal bilateral groundglass nodular opacities worrisome for an atypical pneumonia such as COVID-19 pneumonia versus lung inflammatory disease.  2.  No evidence for pulmonary embolism.  Patient was discharged.    Per Dr. Wu's dictation 5/27/2021 \"Specifically, I do think that up titration of carvedilol as well as the possible addition of an SGLT2 inhibitor and a transition from losartan to Entresto could all be considered. \"    Will message Dr. Wu to review  "

## 2021-06-02 NOTE — TELEPHONE ENCOUNTER
Results of stress MRI and Dr. Wu's recommendations faxed to [facility] at [phone].      6/4/2021 called staff at Dr. Jorge's office. They will update the provider and call back with an update re: are they managing pulmonary plan (and where patient is being sent) or are they deferring to Dr. Wu for a referral?

## 2021-06-02 NOTE — TELEPHONE ENCOUNTER
Per Dr. Wu's message Patient called with preliminary results and that shortness of breath may be related to pulmonary.   Recommendation to fax PCP, Dr. Mello at [facility], a copy of the test and if PCP wants us to refer to Pulmonary we can or PCP can.    Test results will be reviewed in greater detail at upcoming July Dr. Wu visit. As well as further recommendations.

## 2021-06-02 NOTE — TELEPHONE ENCOUNTER
Can we forward the CT report to his PCP? He may need to see pulmonary since this appears to be a lung process. We can put a referral in if his PCP wants us to.  No medication changes at this point until we complete the pulmonary evaluation. Thanks.

## 2021-06-07 NOTE — TELEPHONE ENCOUNTER
Call returned from Dr Jorge's office at [facility], state provider has placed order for pulmonology referral.

## 2021-07-08 ENCOUNTER — OFFICE VISIT (OUTPATIENT)
Dept: CARDIOLOGY | Facility: CLINIC | Age: 66
End: 2021-07-08
Attending: INTERNAL MEDICINE

## 2021-07-08 VITALS
BODY MASS INDEX: 42.25 KG/M2 | DIASTOLIC BLOOD PRESSURE: 83 MMHG | HEART RATE: 63 BPM | SYSTOLIC BLOOD PRESSURE: 125 MMHG | WEIGHT: 315 LBS | OXYGEN SATURATION: 98 %

## 2021-07-08 DIAGNOSIS — I25.110 CORONARY ARTERY DISEASE INVOLVING NATIVE CORONARY ARTERY OF NATIVE HEART WITH UNSTABLE ANGINA PECTORIS (H): ICD-10-CM

## 2021-07-08 DIAGNOSIS — I20.0 UNSTABLE ANGINA (H): ICD-10-CM

## 2021-07-08 DIAGNOSIS — I10 BENIGN ESSENTIAL HYPERTENSION: ICD-10-CM

## 2021-07-08 PROCEDURE — 99214 OFFICE O/P EST MOD 30 MIN: CPT | Performed by: INTERNAL MEDICINE

## 2021-07-08 RX ORDER — DAPAGLIFLOZIN 10 MG/1
10 TABLET, FILM COATED ORAL DAILY
Qty: 90 TABLET | Refills: 3 | Status: SHIPPED | OUTPATIENT
Start: 2021-07-08 | End: 2022-01-10

## 2021-07-08 RX ORDER — CARVEDILOL 6.25 MG/1
6.25 TABLET ORAL 2 TIMES DAILY WITH MEALS
Qty: 180 TABLET | Refills: 3 | Status: SHIPPED | OUTPATIENT
Start: 2021-07-08 | End: 2022-01-10

## 2021-07-08 NOTE — LETTER
7/8/2021    [facility]  [address]  [facility] [address]    RE: Paras Ramos       Dear Colleague,    I had the pleasure of seeing Paras Ramos in the [facility].    HPI and Plan:     I had the pleasure of seeing Mr. Ramos in follow up at the [facility] today.  He is a very pleasant, 66-year-old gentleman whom I saw as an inpatient at [facility] in March of this year.     The patient had undergone removal of a loose staple post right ankle repair and postoperatively started experiencing substernal chest discomfort.  At the time I saw him, I ordered an echocardiogram, which demonstrated severe left ventricular dysfunction with an ejection fraction of 25%-30%.  This appeared to be global in nature, but given these findings, I recommended a coronary angiography, which was performed on 03/09/2021.       The patient was found to have 2 vessel obstructive coronary artery disease with a diffuse mid to distal LAD stenosis that was revascularized with 2 drug-eluting stents.  He also was noted to have a focal 70% stenosis in the RPDA, which was thought to be appropriate for medical therapy initially.  He was also initiated on appropriate medical therapy for his cardiomyopathy.     When the patient was seen in followup in late March of this year, he was actually doing better overall from a cardiovascular standpoint.  He did report some chest discomfort at the time, and revascularization of the RPDA was again discussed, but ultimately not performed.       Unfortunately, he was hospitalized at [facility] in early May of this year with COVID-19 pneumonia. Essentially, he developed fevers/ache/malaise with progressive dyspnea.  He tested positive for COVID-19 and was actually treated with remdesivir and dexamethasone.  He also required supplemental oxygen, which was fortunately weaned off prior to discharge.        The patient had noted persistent dyspnea on exertion since then which was still ongoing at the time of his last office visit in May of this year.  I ordered a comprehensive work-up at the time that included a troponin which was negative, and N-terminal proBNP which was mildly elevated and a CBC which was within normal limits.  A D-dimer was mildly elevated and I referred him for a CT of the chest with contrast which demonstrated no evidence of pulmonary embolism but bilateral multifocal groundglass nodular opacities consistent with COVID-19 pneumonia.  I recommended pulmonary consultation which the patient actually has scheduled for later on this month.    From a cardiovascular standpoint he underwent a stress cardiovascular MRI on 6/2/2021.  This demonstrated moderately reduced left ventricular systolic function with a calculated LVEF of 36%, no ischemia on stress perfusion imaging and subendocardial enhancement consistent with a prior infarct in the circumflex distribution.    Since the time of his last office visit he actually feels his dyspnea has improved.  He does experience bendopnea on occasion but in general his exertional capacity has increased.  He denies any exertional chest pain, palpitations, syncope or presyncope.  He has continued to lose weight at an impressive pace and is down over 50 pounds since he was initially seen as an inpatient.     He remains fully compliant with his medical therapy.     PHYSICAL EXAMINATION:  Dictated below.          IMPRESSION:    1.  Coronary artery disease, status post drug-eluting stent placement to the LAD as described above in 03/2021.  Remains on dual antiplatelet therapy with aspirin 81 mg daily and prasugrel 10 mg daily.  2.  Residual moderate RPDA stenosis, which was not intervened on at the time of his initial angiogram.  A recent stress cardiovascular MRI was negative for ischemia.  3.  Presumed ischemic cardiomyopathy with evidence of improvement in left  ventricular systolic function on his most recent cardiovascular MRI.  4.  Recent COVID-19 pneumonia.  5.  Dyspnea on exertion and generalized fatigue since his recent hospitalization in early May of this year.  This appears to be improving.  6.  Diabetes, type 2.    The patient appears to be doing better overall from a cardiovascular standpoint.  Pulmonary consultation is still appropriate given his recent COVID-19 pneumonia and I will follow-up on the results of this evaluation.    Regarding his ischemic cardiomyopathy, I have recommended the addition of dapagliflozin given his type 2 diabetes and moderately reduced LVEF.  I have also increased his carvedilol dosage to 6.25 mg p.o. twice daily.  We will obtain a follow-up cardiovascular MRI without contrast in approximately 6 months to reassess his left ventricular systolic function, at which point cardiology follow-up will also be scheduled.    It was a pleasure seeing him in follow-up today.        Encounter Diagnosis   Name Primary?     Unstable angina (H)        CURRENT MEDICATIONS:  Current Outpatient Medications   Medication Sig Dispense Refill     amLODIPine (NORVASC) 5 MG tablet Take 5 mg by mouth daily        aspirin (ASA) 81 MG EC tablet Take 1 tablet (81 mg) by mouth daily 30 tablet 0     atorvastatin (LIPITOR) 80 MG tablet Take 1 tablet (80 mg) by mouth At Bedtime 30 tablet 3     carvedilol (COREG) 3.125 MG tablet Take 1 tablet (3.125 mg) by mouth 2 times daily (with meals) 60 tablet 3     HYDROcodone-acetaminophen (NORCO) 5-325 MG tablet Take 1-2 tablets by mouth every 4 hours as needed for moderate to severe pain 10 tablet 0     losartan (COZAAR) 100 MG tablet Take 100 mg by mouth daily        metFORMIN (GLUCOPHAGE-XR) 500 MG 24 hr tablet Take 500 mg by mouth daily (with dinner)       naproxen sodium (ANAPROX) 220 MG tablet Take 220 mg by mouth 2 times daily as needed for moderate pain       nitroGLYcerin (NITROSTAT) 0.4 MG sublingual tablet For chest  pain place 1 tablet under the tongue every 5 minutes for 3 doses. If symptoms persist 5 minutes after 1st dose call 911. 15 tablet 0     ondansetron (ZOFRAN-ODT) 4 MG ODT tab Take 1-2 tablets (4-8 mg) by mouth every 8 hours as needed for nausea 4 tablet 0     prasugrel (EFFIENT) 10 MG TABS tablet Take 1 tablet (10 mg) by mouth daily 30 tablet 3     spironolactone (ALDACTONE) 25 MG tablet Take 1 tablet (25 mg) by mouth daily 30 tablet 3       ALLERGIES   No Known Allergies    PAST MEDICAL HISTORY:  Past Medical History:   Diagnosis Date     Arthritis     OA of ankle and hip     Colon polyp      Coronary artery disease involving native coronary artery of native heart without angina pectoris 3/18/2021    coronary angiogram with DESx2 to LAD, residual RPDA disease 3/2021      Dyslipidemia 3/18/2021     ED (erectile dysfunction)      Gastro-oesophageal reflux disease      Hip dislocation, right (H)      Hypertension      Ischemic cardiomyopathy 3/18/2021     OA (osteoarthritis) of ankle      Osteoarthritis of hip      Sleep apnea     no cpap     T2DM (type 2 diabetes mellitus) (H) 3/18/2021     Testicular hypofunction        PAST SURGICAL HISTORY:  Past Surgical History:   Procedure Laterality Date     ANKLE SURGERY Right      ARTHRODESIS ANKLE  12/9/2013    Procedure: ARTHRODESIS ANKLE;;  Surgeon: Venkata Mccallum MD;  Location: [facility]     ARTHROPLASTY ANKLE  12/9/2013    Procedure: ARTHROPLASTY ANKLE;  RIGHT TOTAL ANKLE ARTHROPLASTY, SUBTALAR FUSION, LIGAMENT REPAIR ([employer] VALENCIA)^;  Surgeon: Venkata Mccallum MD;  Location: [facility]     ARTHROPLASTY HIP  7/23/2014    Procedure: ARTHROPLASTY HIP;  Surgeon: Cirilo Nassar MD;  Location:  OR     ARTHROPLASTY REVISION HIP Right 11/14/2014    Procedure: ARTHROPLASTY REVISION HIP;  Surgeon: Cirilo Nassar MD;  Location:  OR     ARTHROPLASTY REVISION HIP Right 11/9/2015    Procedure: ARTHROPLASTY REVISION HIP;  Surgeon: José Estrella MD;  Location:   OR     ARTHROPLASTY REVISION HIP Right 2/1/2017    Procedure: ARTHROPLASTY REVISION HIP;  Surgeon: Cirilo Nassar MD;  Location:  OR     CHOLECYSTECTOMY       CHOLECYSTECTOMY  2000     COLONOSCOPY       CV CORONARY ANGIOGRAM N/A 3/9/2021    Procedure: Coronary Angiogram;  Surgeon: Dilip Zamora MD;  Location:  [facility]     CV INSTANTANEOUS WAVE-FREE RATIO N/A 3/9/2021    Procedure: Instantaneous Wave-Free Ratio;  Surgeon: Dilip Zamora MD;  Location:  [facility]     CV INTRAVASULAR ULTRASOUND N/A 3/9/2021    Procedure: Intravascular Ultrasound;  Surgeon: Dilip Zamora MD;  Location:  [facility]     CV PCI STENT DRUG ELUTING N/A 3/9/2021    Procedure: Percutaneous Coronary Intervention Stent Drug Eluting;  Surgeon: Dilip Zamora MD;  Location:  [facility]     HC CLOSED TX TRAUMATIC HIP DISLOC W/O ANESTH Right 2/15/2015    Procedure: HIP MANIPULATION / CLOSED REDUCTION;  Surgeon: Cliff Boss MD;  Location: [facility] OR;  Service: Orthopedics     JOINT REPLACEMENT       REMOVE HARDWARE ANKLE Right 3/8/2021    Procedure: RIGHT ANKLE STAPLE REMOVAL;  Surgeon: Venkata Mccallum MD;  Location:  OR     REPAIR LIGAMENT ANKLE  12/9/2013    Procedure: REPAIR LIGAMENT ANKLE;;  Surgeon: Venkata Mccallum MD;  Location: [facility]       FAMILY HISTORY:  Family History   Problem Relation Age of Onset     Heart Failure Mother      Coronary Stenting Mother      Heart Surgery Mother        SOCIAL HISTORY:  Social History     Socioeconomic History     Marital status:      Spouse name: None     Number of children: None     Years of education: None     Highest education level: None   Occupational History     None   Social Needs     Financial resource strain: None     Food insecurity     Worry: None     Inability: None     Transportation needs     Medical: None     Non-medical: None   Tobacco Use     Smoking  status: Never Smoker     Smokeless tobacco: Never Used   Substance and Sexual Activity     Alcohol use: Yes     Comment: 1 can beer a week     Drug use: No     Sexual activity: None   Lifestyle     Physical activity     Days per week: None     Minutes per session: None     Stress: None   Relationships     Social connections     Talks on phone: None     Gets together: None     Attends Gnosticist service: None     Active member of club or organization: None     Attends meetings of clubs or organizations: None     Relationship status: None     Intimate partner violence     Fear of current or ex partner: None     Emotionally abused: None     Physically abused: None     Forced sexual activity: None   Other Topics Concern     None   Social History Narrative     None       Review of Systems:  Skin:  Negative       Eyes:  Positive for glasses    ENT:  Negative      Respiratory:  Positive for dyspnea on exertion;sleep apnea;CPAP     Cardiovascular:    Positive for;chest pain;palpitations;edema;lightheadedness    Gastroenterology: Negative      Genitourinary:  Negative      Musculoskeletal:  Positive for arthritis    Neurologic:  Positive for numbness or tingling of hands(left hand)    Psychiatric:  Negative      Heme/Lymph/Imm:  Negative      Endocrine:  Positive for diabetes      Physical Exam:  Vitals: /83   Pulse 63   Wt (!) 153.3 kg (338 lb)   SpO2 98%   BMI 42.25 kg/m      Constitutional:  cooperative, alert and oriented, well developed, well nourished, in no acute distress        Skin:  warm and dry to the touch, no apparent skin lesions or masses noted          Head:  normocephalic, no masses or lesions        Eyes:  pupils equal and round        Lymph:      ENT:  no pallor or cyanosis, dentition good        Neck:  JVP normal        Respiratory:  clear to auscultation         Cardiac: regular rhythm                pulses full and equal                                        GI:  abdomen soft         Extremities and Muscular Skeletal:  no edema              Neurological:  no gross motor deficits        Psych:  Alert and Oriented x 3        CC  Shagufta Wu MD  [address]  [place] [id number]                  Thank you for allowing me to participate in the care of your patient.      Sincerely,     Shagufta Wu MD     [facility]  cc:   Shagufta Wu MD  [address]  [place] [id number]

## 2021-09-09 DIAGNOSIS — I10 BENIGN ESSENTIAL HYPERTENSION: ICD-10-CM

## 2021-09-09 DIAGNOSIS — I25.110 CORONARY ARTERY DISEASE INVOLVING NATIVE CORONARY ARTERY OF NATIVE HEART WITH UNSTABLE ANGINA PECTORIS (H): ICD-10-CM

## 2021-09-09 RX ORDER — ATORVASTATIN CALCIUM 80 MG/1
80 TABLET, FILM COATED ORAL AT BEDTIME
Qty: 90 TABLET | Refills: 3 | Status: SHIPPED | OUTPATIENT
Start: 2021-09-09 | End: 2022-08-30

## 2021-09-09 RX ORDER — SPIRONOLACTONE 25 MG/1
25 TABLET ORAL DAILY
Qty: 90 TABLET | Refills: 3 | Status: SHIPPED | OUTPATIENT
Start: 2021-09-09 | End: 2022-08-30

## 2021-09-09 RX ORDER — PRASUGREL 10 MG/1
10 TABLET, FILM COATED ORAL DAILY
Qty: 90 TABLET | Refills: 3 | Status: SHIPPED | OUTPATIENT
Start: 2021-09-09 | End: 2023-01-10

## 2021-10-02 ENCOUNTER — HEALTH MAINTENANCE LETTER (OUTPATIENT)
Age: 66
End: 2021-10-02

## 2022-01-04 ENCOUNTER — HOSPITAL ENCOUNTER (OUTPATIENT)
Dept: CARDIOLOGY | Facility: CLINIC | Age: 67
Discharge: HOME | End: 2022-01-04
Attending: INTERNAL MEDICINE | Admitting: INTERNAL MEDICINE
Payer: COMMERCIAL

## 2022-01-04 DIAGNOSIS — I25.110 CORONARY ARTERY DISEASE INVOLVING NATIVE CORONARY ARTERY OF NATIVE HEART WITH UNSTABLE ANGINA PECTORIS (H): ICD-10-CM

## 2022-01-04 PROCEDURE — 75557 CARDIAC MRI FOR MORPH: CPT | Mod: 26 | Performed by: INTERNAL MEDICINE

## 2022-01-04 PROCEDURE — 75557 CARDIAC MRI FOR MORPH: CPT

## 2022-01-09 NOTE — PROGRESS NOTES
HPI and Plan:     I had the pleasure of seeing Mr. Ramos in follow up at the [facility] today.  He is a very pleasant, 66-year-old gentleman whom I originally saw as an inpatient at [facility] in March of this year.     The patient had undergone removal of a loose staple post right ankle repair and postoperatively started experiencing substernal chest discomfort.  At the time I saw him, I ordered an echocardiogram, which demonstrated severe left ventricular dysfunction with an ejection fraction of 25%-30%.  This appeared to be global in nature, but given these findings, I recommended a coronary angiography, which was performed on 03/09/2021.       The patient was found to have 2 vessel obstructive coronary artery disease with a diffuse mid to distal LAD stenosis that was revascularized with 2 drug-eluting stents.  He also was noted to have a focal 70% stenosis in the RPDA, which was thought to be appropriate for medical therapy initially.  He was also initiated on appropriate medical therapy for his cardiomyopathy.     Unfortunately, he was hospitalized at [facility] in May 2021 with COVID-19 pneumonia. Essentially, he developed fevers/ache/malaise with progressive dyspnea.  He tested positive for COVID-19 and was actually treated with remdesivir and dexamethasone.  He also required supplemental oxygen, which was fortunately weaned off prior to discharge.      From a cardiovascular standpoint he underwent a stress cardiovascular MRI on 6/2/2021.  This demonstrated moderately reduced left ventricular systolic function with a calculated LVEF of 36%, no ischemia on stress perfusion imaging and subendocardial enhancement consistent with a prior infarct in the circumflex distribution.    At the time of his office visit in July 2021, I also recommended the addition of dapagliflozin given his type 2 diabetes and moderately reduced left ventricular systolic function.  His carvedilol was  also increased to 6.25 mg twice a day.  Unfortunately the dapagliflozin was prohibitive from a cost standpoint and he did not end up taking this.    Today however he feels well from a cardiovascular standpoint.  He has maintained his impressive weight loss and denies any chest discomfort, exertional dyspnea or palpitations.  He denies any PND or orthopnea.  He has not been working out as regularly as before but is planning to resume working out at the gym at some point.    He does mention 2 episodes of sudden onset dizziness and lightheadedness that occurred in October and then early November.  They occurred while he was sitting and did not progress to daren syncope.  He denies any associated palpitations or chest discomfort when these occurred.      He remains fully compliant with his medical therapy.     PHYSICAL EXAMINATION:  Dictated below.    A cardiovascular MRI without contrast on 61/4/2022 demonstrated interval improvement in his left ventricular systolic function with a quantitative LVEF of 43%.  Right ventricular systolic function was normal.       IMPRESSION:    1.  Coronary artery disease, status post drug-eluting stent placement to the LAD as described above in 03/2021.  Remains on dual antiplatelet therapy with aspirin 81 mg daily and prasugrel 10 mg daily.  2.  Residual moderate RPDA stenosis, which was not intervened on at the time of his initial angiogram.  A recent stress cardiovascular MRI was negative for ischemia.  3.  Presumed ischemic cardiomyopathy with evidence of improvement in left ventricular systolic function on his most recent cardiovascular MRI.  4.  Recent COVID-19 pneumonia.  5.  2 episodes of lightheadedness that occurred late last year.  They did not progress to daren syncope and have not recurred since then.    The patient appears to be doing better overall from a cardiovascular standpoint.  The interval improvement in his left ventricular systolic function is encouraging.   Further optimization of his medical therapy would be reasonable and I have asked him to increase his carvedilol to 12.5 mg p.o. twice daily.    Regarding the episodes of dizziness/lightheadedness, I have ordered a carotid ultrasound as well as a Zio patch monitor to rule out any significant tacky or bradycardia arrhythmias that could have contributed to his symptoms.    He will complete his 1 year of dual antiplatelet therapy in March of this year at which point and asked him to discontinue his prasugrel.  He will continue aspirin indefinitely.      It was a pleasure seeing him in follow-up today.  Assuming his clinical status remains stable, I will plan on follow-up in approximately 6 months.        No diagnosis found.    CURRENT MEDICATIONS:  Current Outpatient Medications   Medication Sig Dispense Refill     amLODIPine (NORVASC) 5 MG tablet Take 5 mg by mouth daily        aspirin (ASA) 81 MG EC tablet Take 1 tablet (81 mg) by mouth daily 30 tablet 0     atorvastatin (LIPITOR) 80 MG tablet Take 1 tablet (80 mg) by mouth At Bedtime 90 tablet 3     carvedilol (COREG) 6.25 MG tablet Take 1 tablet (6.25 mg) by mouth 2 times daily (with meals) 180 tablet 3     dapagliflozin (FARXIGA) 10 MG TABS tablet Take 1 tablet (10 mg) by mouth daily 90 tablet 3     HYDROcodone-acetaminophen (NORCO) 5-325 MG tablet Take 1-2 tablets by mouth every 4 hours as needed for moderate to severe pain 10 tablet 0     losartan (COZAAR) 100 MG tablet Take 100 mg by mouth daily        metFORMIN (GLUCOPHAGE-XR) 500 MG 24 hr tablet Take 500 mg by mouth daily (with dinner)       naproxen sodium (ANAPROX) 220 MG tablet Take 220 mg by mouth 2 times daily as needed for moderate pain       nitroGLYcerin (NITROSTAT) 0.4 MG sublingual tablet For chest pain place 1 tablet under the tongue every 5 minutes for 3 doses. If symptoms persist 5 minutes after 1st dose call 911. 15 tablet 0     ondansetron (ZOFRAN-ODT) 4 MG ODT tab Take 1-2 tablets (4-8 mg) by  mouth every 8 hours as needed for nausea 4 tablet 0     prasugrel (EFFIENT) 10 MG TABS tablet Take 1 tablet (10 mg) by mouth daily 90 tablet 3     spironolactone (ALDACTONE) 25 MG tablet Take 1 tablet (25 mg) by mouth daily 90 tablet 3       ALLERGIES   No Known Allergies    PAST MEDICAL HISTORY:  Past Medical History:   Diagnosis Date     Arthritis     OA of ankle and hip     Colon polyp      Coronary artery disease involving native coronary artery of native heart without angina pectoris 3/18/2021    coronary angiogram with DESx2 to LAD, residual RPDA disease 3/2021      Dyslipidemia 3/18/2021     ED (erectile dysfunction)      Gastro-oesophageal reflux disease      Hip dislocation, right (H)      Hypertension      Ischemic cardiomyopathy 3/18/2021     OA (osteoarthritis) of ankle      Osteoarthritis of hip      Sleep apnea     no cpap     T2DM (type 2 diabetes mellitus) (H) 3/18/2021     Testicular hypofunction        PAST SURGICAL HISTORY:  Past Surgical History:   Procedure Laterality Date     ANKLE SURGERY Right      ARTHRODESIS ANKLE  12/9/2013    Procedure: ARTHRODESIS ANKLE;;  Surgeon: Venkata Mccallum MD;  [facility]     ARTHROPLASTY ANKLE  12/9/2013    Procedure: ARTHROPLASTY ANKLE;  RIGHT TOTAL ANKLE ARTHROPLASTY, SUBTALAR FUSION, LIGAMENT REPAIR ([place] VALENC[place])^;  Surgeon: Venkata Mccallum MD;  [facility]     ARTHROPLASTY HIP  7/23/2014    Procedure: ARTHROPLASTY HIP;  Surgeon: Cirilo Nassar MD;  Location:  [facility]     ARTHROPLASTY REVISION HIP Right 11/14/2014    Procedure: ARTHROPLASTY REVISION HIP;  Surgeon: Cirilo Nassar MD;  Location:  OR     ARTHROPLASTY REVISION HIP Right 11/9/2015    Procedure: ARTHROPLASTY REVISION HIP;  Surgeon: José Estrella MD;  Location:  OR     ARTHROPLASTY REVISION HIP Right 2/1/2017    Procedure: ARTHROPLASTY REVISION HIP;  Surgeon: Cirilo Nassar MD;  Location:  OR     CHOLECYSTECTOMY       CHOLECYSTECTOMY  2000     COLONOSCOPY        CV CORONARY ANGIOGRAM N/A 3/9/2021    Procedure: Coronary Angiogram;  Surgeon: Dilip Zamora MD;  Location:  [facility]     CV INSTANTANEOUS WAVE-FREE RATIO N/A 3/9/2021    Procedure: Instantaneous Wave-Free Ratio;  Surgeon: Dilip Zamora MD;  Location:  [facility]     CV INTRAVASULAR ULTRASOUND N/A 3/9/2021    Procedure: Intravascular Ultrasound;  Surgeon: Dilip Zamora MD;  Location:  [facility]     CV PCI STENT DRUG ELUTING N/A 3/9/2021    Procedure: Percutaneous Coronary Intervention Stent Drug Eluting;  Surgeon: Dilip Zamora MD;  Location:  [facility]     HC CLOSED TX TRAUMATIC HIP DISLOC W/O ANESTH Right 2/15/2015    Procedure: HIP MANIPULATION / CLOSED REDUCTION;  Surgeon: Cliff Boss MD;  Location: [facility] OR;  Service: Orthopedics     JOINT REPLACEMENT       REMOVE HARDWARE ANKLE Right 3/8/2021    Procedure: RIGHT ANKLE STAPLE REMOVAL;  Surgeon: Venkata Mccallum MD;  Location:  [facility]     REPAIR LIGAMENT ANKLE  12/9/2013    Procedure: REPAIR LIGAMENT ANKLE;;  Surgeon: Venkata Mccallum MD;  Location: [facility]       FAMILY HISTORY:  Family History   Problem Relation Age of Onset     Heart Failure Mother      Coronary Stenting Mother      Heart Surgery Mother        SOCIAL HISTORY:  Social History     Socioeconomic History     Marital status:      Spouse name: Not on file     Number of children: Not on file     Years of education: Not on file     Highest education level: Not on file   Occupational History     Not on file   Tobacco Use     Smoking status: Never Smoker     Smokeless tobacco: Never Used   Substance and Sexual Activity     Alcohol use: Yes     Comment: 1 can beer a week     Drug use: No     Sexual activity: Not on file   Other Topics Concern     Not on file   Social History Narrative     Not on file     Social Determinants of Health     Financial Resource Strain: Not on  file   Food Insecurity: Not on file   Transportation Needs: Not on file   Physical Activity: Not on file   Stress: Not on file   Social Connections: Not on file   Intimate Partner Violence: Not on file   Housing Stability: Not on file       Review of Systems:  Skin:          Eyes:         ENT:         Respiratory:          Cardiovascular:         Gastroenterology:        Genitourinary:         Musculoskeletal:         Neurologic:         Psychiatric:         Heme/Lymph/Imm:         Endocrine:           Physical Exam:  Vitals: There were no vitals taken for this visit.    Constitutional:  cooperative, alert and oriented, well developed, well nourished, in no acute distress        Skin:  warm and dry to the touch, no apparent skin lesions or masses noted          Head:  normocephalic, no masses or lesions        Eyes:  pupils equal and round        Lymph:      ENT:           Neck:  JVP normal        Respiratory:  clear to auscultation         Cardiac: regular rhythm                                                         GI:  abdomen soft        Extremities and Muscular Skeletal:  no deformities, clubbing, cyanosis, erythema observed              Neurological:  no gross motor deficits        Psych:  Alert and Oriented x 3                                CURRENT MEDICATIONS:  Current Outpatient Medications   Medication Sig Dispense Refill     amLODIPine (NORVASC) 5 MG tablet Take 5 mg by mouth daily        aspirin (ASA) 81 MG EC tablet Take 1 tablet (81 mg) by mouth daily 30 tablet 0     atorvastatin (LIPITOR) 80 MG tablet Take 1 tablet (80 mg) by mouth At Bedtime 90 tablet 3     carvedilol (COREG) 6.25 MG tablet Take 1 tablet (6.25 mg) by mouth 2 times daily (with meals) 180 tablet 3     dapagliflozin (FARXIGA) 10 MG TABS tablet Take 1 tablet (10 mg) by mouth daily 90 tablet 3     HYDROcodone-acetaminophen (NORCO) 5-325 MG tablet Take 1-2 tablets by mouth every 4 hours as needed for moderate to severe pain 10 tablet 0      losartan (COZAAR) 100 MG tablet Take 100 mg by mouth daily        metFORMIN (GLUCOPHAGE-XR) 500 MG 24 hr tablet Take 500 mg by mouth daily (with dinner)       naproxen sodium (ANAPROX) 220 MG tablet Take 220 mg by mouth 2 times daily as needed for moderate pain       nitroGLYcerin (NITROSTAT) 0.4 MG sublingual tablet For chest pain place 1 tablet under the tongue every 5 minutes for 3 doses. If symptoms persist 5 minutes after 1st dose call 911. 15 tablet 0     ondansetron (ZOFRAN-ODT) 4 MG ODT tab Take 1-2 tablets (4-8 mg) by mouth every 8 hours as needed for nausea 4 tablet 0     prasugrel (EFFIENT) 10 MG TABS tablet Take 1 tablet (10 mg) by mouth daily 90 tablet 3     spironolactone (ALDACTONE) 25 MG tablet Take 1 tablet (25 mg) by mouth daily 90 tablet 3       ALLERGIES   No Known Allergies    PAST MEDICAL HISTORY:  Past Medical History:   Diagnosis Date     Arthritis     OA of ankle and hip     Colon polyp      Coronary artery disease involving native coronary artery of native heart without angina pectoris 3/18/2021    coronary angiogram with DESx2 to LAD, residual RPDA disease 3/2021      Dyslipidemia 3/18/2021     ED (erectile dysfunction)      Gastro-oesophageal reflux disease      Hip dislocation, right (H)      Hypertension      Ischemic cardiomyopathy 3/18/2021     OA (osteoarthritis) of ankle      Osteoarthritis of hip      Sleep apnea     no cpap     T2DM (type 2 diabetes mellitus) (H) 3/18/2021     Testicular hypofunction        PAST SURGICAL HISTORY:  Past Surgical History:   Procedure Laterality Date     ANKLE SURGERY Right      ARTHRODESIS ANKLE  12/9/2013    Procedure: ARTHRODESIS ANKLE;;  Surgeon: Venkata Mccallum MD;  Location: [facility]     ARTHROPLASTY ANKLE  12/9/2013    Procedure: ARTHROPLASTY ANKLE;  RIGHT TOTAL ANKLE ARTHROPLASTY, SUBTALAR FUSION, LIGAMENT REPAIR ([place])^;  Surgeon: Venkata Mccallum MD;  Location: [facility]     ARTHROPLASTY HIP  7/23/2014    Procedure:  ARTHROPLASTY HIP;  Surgeon: Cirilo Nassar MD;  Location:  OR     ARTHROPLASTY REVISION HIP Right 11/14/2014    Procedure: ARTHROPLASTY REVISION HIP;  Surgeon: Cirilo Nassar MD;  Location:  OR     ARTHROPLASTY REVISION HIP Right 11/9/2015    Procedure: ARTHROPLASTY REVISION HIP;  Surgeon: José Estrella MD;  Location:  OR     ARTHROPLASTY REVISION HIP Right 2/1/2017    Procedure: ARTHROPLASTY REVISION HIP;  Surgeon: Cirilo Nassar MD;  Location:  OR     CHOLECYSTECTOMY       CHOLECYSTECTOMY  2000     COLONOSCOPY       CV CORONARY ANGIOGRAM N/A 3/9/2021    Procedure: Coronary Angiogram;  Surgeon: Dilip Zamora MD;  Location:  [facility]     CV INSTANTANEOUS WAVE-FREE RATIO N/A 3/9/2021    Procedure: Instantaneous Wave-Free Ratio;  Surgeon: Dilip Zamora MD;  Location:  [facility]     CV INTRAVASULAR ULTRASOUND N/A 3/9/2021    Procedure: Intravascular Ultrasound;  Surgeon: Dilip Zamora MD;  Location:  [facility]     CV PCI STENT DRUG ELUTING N/A 3/9/2021    Procedure: Percutaneous Coronary Intervention Stent Drug Eluting;  Surgeon: Dilip Zamora MD;  Location:  [facility]     HC CLOSED TX TRAUMATIC HIP DISLOC W/O ANESTH Right 2/15/2015    Procedure: HIP MANIPULATION / CLOSED REDUCTION;  Surgeon: Cliff Boss MD;  Location: [facility];  Service: Orthopedics     JOINT REPLACEMENT       REMOVE HARDWARE ANKLE Right 3/8/2021    Procedure: RIGHT ANKLE STAPLE REMOVAL;  Surgeon: Venkata Mccallum MD;  Location: [facility]     REPAIR LIGAMENT ANKLE  12/9/2013    Procedure: REPAIR LIGAMENT ANKLE;;  Surgeon: Venkata Mccallum MD;  Location: [facility]       FAMILY HISTORY:  Family History   Problem Relation Age of Onset     Heart Failure Mother      Coronary Stenting Mother      Heart Surgery Mother        SOCIAL HISTORY:  Social History     Socioeconomic History     Marital status:       Spouse name: Not on file     Number of children: Not on file     Years of education: Not on file     Highest education level: Not on file   Occupational History     Not on file   Tobacco Use     Smoking status: Never Smoker     Smokeless tobacco: Never Used   Substance and Sexual Activity     Alcohol use: Yes     Comment: 1 can beer a week     Drug use: No     Sexual activity: Not on file   Other Topics Concern     Not on file   Social History Narrative     Not on file     Social Determinants of Health     Financial Resource Strain: Not on file   Food Insecurity: Not on file   Transportation Needs: Not on file   Physical Activity: Not on file   Stress: Not on file   Social Connections: Not on file   Intimate Partner Violence: Not on file   Housing Stability: Not on file       Review of Systems:  Skin:          Eyes:         ENT:         Respiratory:          Cardiovascular:         Gastroenterology:        Genitourinary:         Musculoskeletal:         Neurologic:         Psychiatric:         Heme/Lymph/Imm:         Endocrine:           Physical Exam:  Vitals: There were no vitals taken for this visit.    Constitutional:  cooperative, alert and oriented, well developed, well nourished, in no acute distress        Skin:  warm and dry to the touch, no apparent skin lesions or masses noted          Head:  normocephalic, no masses or lesions        Eyes:  pupils equal and round        Lymph:      ENT:           Neck:  JVP normal        Respiratory:  clear to auscultation         Cardiac: regular rhythm                                                         GI:  abdomen soft        Extremities and Muscular Skeletal:  no deformities, clubbing, cyanosis, erythema observed              Neurological:  no gross motor deficits        Psych:  Alert and Oriented x 3        CC  Shagufta Wu MD  [id number]  [place] [id number]

## 2022-01-10 ENCOUNTER — OFFICE VISIT (OUTPATIENT)
Dept: CARDIOLOGY | Facility: CLINIC | Age: 67
End: 2022-01-10
Payer: COMMERCIAL

## 2022-01-10 VITALS
BODY MASS INDEX: 40.43 KG/M2 | HEIGHT: 74 IN | SYSTOLIC BLOOD PRESSURE: 151 MMHG | WEIGHT: 315 LBS | HEART RATE: 64 BPM | DIASTOLIC BLOOD PRESSURE: 97 MMHG

## 2022-01-10 DIAGNOSIS — I10 BENIGN ESSENTIAL HYPERTENSION: ICD-10-CM

## 2022-01-10 DIAGNOSIS — R55 SYNCOPE, UNSPECIFIED SYNCOPE TYPE: Primary | ICD-10-CM

## 2022-01-10 DIAGNOSIS — I25.110 CORONARY ARTERY DISEASE INVOLVING NATIVE CORONARY ARTERY OF NATIVE HEART WITH UNSTABLE ANGINA PECTORIS (H): ICD-10-CM

## 2022-01-10 PROCEDURE — 99214 OFFICE O/P EST MOD 30 MIN: CPT | Performed by: INTERNAL MEDICINE

## 2022-01-10 RX ORDER — CARVEDILOL 12.5 MG/1
12.5 TABLET ORAL 2 TIMES DAILY WITH MEALS
Qty: 180 TABLET | Refills: 3 | Status: SHIPPED | OUTPATIENT
Start: 2022-01-10 | End: 2023-01-10

## 2022-01-10 ASSESSMENT — MIFFLIN-ST. JEOR: SCORE: 2368.84

## 2022-01-10 NOTE — PATIENT INSTRUCTIONS
1) Please stop your prasugrel (effient) in 03/2022.    2) Increase carvedilol to 12.5 twice a day.

## 2022-01-10 NOTE — LETTER
1/10/2022    Dilip Ellis MD  [address] [place] [id number]    RE: Paras Ramos       Dear Colleague,     I had the pleasure of seeing Paras Ramos in the [facility].    HPI and Plan:     I had the pleasure of seeing Mr. Ramos in follow up at the [facility] today.  He is a very pleasant, 66-year-old gentleman whom I originally saw as an inpatient at [facility] in March of this year.     The patient had undergone removal of a loose staple post right ankle repair and postoperatively started experiencing substernal chest discomfort.  At the time I saw him, I ordered an echocardiogram, which demonstrated severe left ventricular dysfunction with an ejection fraction of 25%-30%.  This appeared to be global in nature, but given these findings, I recommended a coronary angiography, which was performed on 03/09/2021.       The patient was found to have 2 vessel obstructive coronary artery disease with a diffuse mid to distal LAD stenosis that was revascularized with 2 drug-eluting stents.  He also was noted to have a focal 70% stenosis in the RPDA, which was thought to be appropriate for medical therapy initially.  He was also initiated on appropriate medical therapy for his cardiomyopathy.     Unfortunately, he was hospitalized at [facility] in May 2021 with COVID-19 pneumonia. Essentially, he developed fevers/ache/malaise with progressive dyspnea.  He tested positive for COVID-19 and was actually treated with remdesivir and dexamethasone.  He also required supplemental oxygen, which was fortunately weaned off prior to discharge.      From a cardiovascular standpoint he underwent a stress cardiovascular MRI on 6/2/2021.  This demonstrated moderately reduced left ventricular systolic function with a calculated LVEF of 36%, no ischemia on stress perfusion imaging and subendocardial enhancement consistent with a prior infarct in the circumflex  distribution.    At the time of his office visit in July 2021, I also recommended the addition of dapagliflozin given his type 2 diabetes and moderately reduced left ventricular systolic function.  His carvedilol was also increased to 6.25 mg twice a day.  Unfortunately the dapagliflozin was prohibitive from a cost standpoint and he did not end up taking this.    Today however he feels well from a cardiovascular standpoint.  He has maintained his impressive weight loss and denies any chest discomfort, exertional dyspnea or palpitations.  He denies any PND or orthopnea.  He has not been working out as regularly as before but is planning to resume working out at the gym at some point.    He does mention 2 episodes of sudden onset dizziness and lightheadedness that occurred in October and then early November.  They occurred while he was sitting and did not progress to daren syncope.  He denies any associated palpitations or chest discomfort when these occurred.      He remains fully compliant with his medical therapy.     PHYSICAL EXAMINATION:  Dictated below.    A cardiovascular MRI without contrast on 61/4/2022 demonstrated interval improvement in his left ventricular systolic function with a quantitative LVEF of 43%.  Right ventricular systolic function was normal.       IMPRESSION:    1.  Coronary artery disease, status post drug-eluting stent placement to the LAD as described above in 03/2021.  Remains on dual antiplatelet therapy with aspirin 81 mg daily and prasugrel 10 mg daily.  2.  Residual moderate RPDA stenosis, which was not intervened on at the time of his initial angiogram.  A recent stress cardiovascular MRI was negative for ischemia.  3.  Presumed ischemic cardiomyopathy with evidence of improvement in left ventricular systolic function on his most recent cardiovascular MRI.  4.  Recent COVID-19 pneumonia.  5.  2 episodes of lightheadedness that occurred late last year.  They did not progress to daren  syncope and have not recurred since then.    The patient appears to be doing better overall from a cardiovascular standpoint.  The interval improvement in his left ventricular systolic function is encouraging.  Further optimization of his medical therapy would be reasonable and I have asked him to increase his carvedilol to 12.5 mg p.o. twice daily.    Regarding the episodes of dizziness/lightheadedness, I have ordered a carotid ultrasound as well as a Zio patch monitor to rule out any significant tacky or bradycardia arrhythmias that could have contributed to his symptoms.    He will complete his 1 year of dual antiplatelet therapy in March of this year at which point and asked him to discontinue his prasugrel.  He will continue aspirin indefinitely.      It was a pleasure seeing him in follow-up today.  Assuming his clinical status remains stable, I will plan on follow-up in approximately 6 months.        No diagnosis found.    CURRENT MEDICATIONS:  Current Outpatient Medications   Medication Sig Dispense Refill     amLODIPine (NORVASC) 5 MG tablet Take 5 mg by mouth daily        aspirin (ASA) 81 MG EC tablet Take 1 tablet (81 mg) by mouth daily 30 tablet 0     atorvastatin (LIPITOR) 80 MG tablet Take 1 tablet (80 mg) by mouth At Bedtime 90 tablet 3     carvedilol (COREG) 6.25 MG tablet Take 1 tablet (6.25 mg) by mouth 2 times daily (with meals) 180 tablet 3     dapagliflozin (FARXIGA) 10 MG TABS tablet Take 1 tablet (10 mg) by mouth daily 90 tablet 3     HYDROcodone-acetaminophen (NORCO) 5-325 MG tablet Take 1-2 tablets by mouth every 4 hours as needed for moderate to severe pain 10 tablet 0     losartan (COZAAR) 100 MG tablet Take 100 mg by mouth daily        metFORMIN (GLUCOPHAGE-XR) 500 MG 24 hr tablet Take 500 mg by mouth daily (with dinner)       naproxen sodium (ANAPROX) 220 MG tablet Take 220 mg by mouth 2 times daily as needed for moderate pain       nitroGLYcerin (NITROSTAT) 0.4 MG sublingual tablet For  chest pain place 1 tablet under the tongue every 5 minutes for 3 doses. If symptoms persist 5 minutes after 1st dose call 911. 15 tablet 0     ondansetron (ZOFRAN-ODT) 4 MG ODT tab Take 1-2 tablets (4-8 mg) by mouth every 8 hours as needed for nausea 4 tablet 0     prasugrel (EFFIENT) 10 MG TABS tablet Take 1 tablet (10 mg) by mouth daily 90 tablet 3     spironolactone (ALDACTONE) 25 MG tablet Take 1 tablet (25 mg) by mouth daily 90 tablet 3       ALLERGIES   No Known Allergies    PAST MEDICAL HISTORY:  Past Medical History:   Diagnosis Date     Arthritis     OA of ankle and hip     Colon polyp      Coronary artery disease involving native coronary artery of native heart without angina pectoris 3/18/2021    coronary angiogram with DESx2 to LAD, residual RPDA disease 3/2021      Dyslipidemia 3/18/2021     ED (erectile dysfunction)      Gastro-oesophageal reflux disease      Hip dislocation, right (H)      Hypertension      Ischemic cardiomyopathy 3/18/2021     OA (osteoarthritis) of ankle      Osteoarthritis of hip      Sleep apnea     no cpap     T2DM (type 2 diabetes mellitus) (H) 3/18/2021     Testicular hypofunction        PAST SURGICAL HISTORY:  Past Surgical History:   Procedure Laterality Date     ANKLE SURGERY Right      ARTHRODESIS ANKLE  12/9/2013    Procedure: ARTHRODESIS ANKLE;;  Surgeon: Venkata Mccallum MD;  Location: [facility]     ARTHROPLASTY ANKLE  12/9/2013    Procedure: ARTHROPLASTY ANKLE;  RIGHT TOTAL ANKLE ARTHROPLASTY, SUBTALAR FUSION, LIGAMENT REPAIR ([place] VALENCIA)^;  Surgeon: Venkata Mccallum MD;  Location: [facility]     ARTHROPLASTY HIP  7/23/2014    Procedure: ARTHROPLASTY HIP;  Surgeon: Cirilo Nassar MD;  Location:  OR     ARTHROPLASTY REVISION HIP Right 11/14/2014    Procedure: ARTHROPLASTY REVISION HIP;  Surgeon: Cirilo Nassar MD;  Location:  OR     ARTHROPLASTY REVISION HIP Right 11/9/2015    Procedure: ARTHROPLASTY REVISION HIP;  Surgeon: José Estrella MD;   Location:  OR     ARTHROPLASTY REVISION HIP Right 2/1/2017    Procedure: ARTHROPLASTY REVISION HIP;  Surgeon: Cirilo Nassar MD;  Location:  OR     CHOLECYSTECTOMY       CHOLECYSTECTOMY  2000     COLONOSCOPY       CV CORONARY ANGIOGRAM N/A 3/9/2021    Procedure: Coronary Angiogram;  Surgeon: Dilip Zamora MD;  Location:  HEART CARDIAC CATH LAB     CV INSTANTANEOUS WAVE-FREE RATIO N/A 3/9/2021    Procedure: Instantaneous Wave-Free Ratio;  Surgeon: Dilip Zamora MD;  Location:  [facility]     CV INTRAVASULAR ULTRASOUND N/A 3/9/2021    Procedure: Intravascular Ultrasound;  Surgeon: Dilip Zamora MD;  Location:  [facility]     CV PCI STENT DRUG ELUTING N/A 3/9/2021    Procedure: Percutaneous Coronary Intervention Stent Drug Eluting;  Surgeon: Dilip Zamora MD;  Location:  HE[facility] CARDIAC CATH LAB     HC CLOSED TX TRAUMATIC HIP DISLOC W/O ANESTH Right 2/15/2015    Procedure: HIP MANIPULATION / CLOSED REDUCTION;  Surgeon: Cliff Boss MD;  Location: [facility] OR;  Service: Orthopedics     JOINT REPLACEMENT       REMOVE HARDWARE ANKLE Right 3/8/2021    Procedure: RIGHT ANKLE STAPLE REMOVAL;  Surgeon: Venkata Mccallum MD;  Location:  OR     REPAIR LIGAMENT ANKLE  12/9/2013    Procedure: REPAIR LIGAMENT ANKLE;;  Surgeon: Venkata Mccallum MD;  Location: [facility]       FAMILY HISTORY:  Family History   Problem Relation Age of Onset     Heart Failure Mother      Coronary Stenting Mother      Heart Surgery Mother        SOCIAL HISTORY:  Social History     Socioeconomic History     Marital status:      Spouse name: Not on file     Number of children: Not on file     Years of education: Not on file     Highest education level: Not on file   Occupational History     Not on file   Tobacco Use     Smoking status: Never Smoker     Smokeless tobacco: Never Used   Substance and Sexual Activity     Alcohol use: Yes     Comment: 1  can beer a week     Drug use: No     Sexual activity: Not on file   Other Topics Concern     Not on file   Social History Narrative     Not on file     Social Determinants of Health     Financial Resource Strain: Not on file   Food Insecurity: Not on file   Transportation Needs: Not on file   Physical Activity: Not on file   Stress: Not on file   Social Connections: Not on file   Intimate Partner Violence: Not on file   Housing Stability: Not on file       Review of Systems:  Skin:          Eyes:         ENT:         Respiratory:          Cardiovascular:         Gastroenterology:        Genitourinary:         Musculoskeletal:         Neurologic:         Psychiatric:         Heme/Lymph/Imm:         Endocrine:           Physical Exam:  Vitals: There were no vitals taken for this visit.    Constitutional:  cooperative, alert and oriented, well developed, well nourished, in no acute distress        Skin:  warm and dry to the touch, no apparent skin lesions or masses noted          Head:  normocephalic, no masses or lesions        Eyes:  pupils equal and round        Lymph:      ENT:           Neck:  JVP normal        Respiratory:  clear to auscultation         Cardiac: regular rhythm                                                         GI:  abdomen soft        Extremities and Muscular Skeletal:  no deformities, clubbing, cyanosis, erythema observed              Neurological:  no gross motor deficits        Psych:  Alert and Oriented x 3                                CURRENT MEDICATIONS:  Current Outpatient Medications   Medication Sig Dispense Refill     amLODIPine (NORVASC) 5 MG tablet Take 5 mg by mouth daily        aspirin (ASA) 81 MG EC tablet Take 1 tablet (81 mg) by mouth daily 30 tablet 0     atorvastatin (LIPITOR) 80 MG tablet Take 1 tablet (80 mg) by mouth At Bedtime 90 tablet 3     carvedilol (COREG) 6.25 MG tablet Take 1 tablet (6.25 mg) by mouth 2 times daily (with meals) 180 tablet 3     dapagliflozin  (FARXIGA) 10 MG TABS tablet Take 1 tablet (10 mg) by mouth daily 90 tablet 3     HYDROcodone-acetaminophen (NORCO) 5-325 MG tablet Take 1-2 tablets by mouth every 4 hours as needed for moderate to severe pain 10 tablet 0     losartan (COZAAR) 100 MG tablet Take 100 mg by mouth daily        metFORMIN (GLUCOPHAGE-XR) 500 MG 24 hr tablet Take 500 mg by mouth daily (with dinner)       naproxen sodium (ANAPROX) 220 MG tablet Take 220 mg by mouth 2 times daily as needed for moderate pain       nitroGLYcerin (NITROSTAT) 0.4 MG sublingual tablet For chest pain place 1 tablet under the tongue every 5 minutes for 3 doses. If symptoms persist 5 minutes after 1st dose call 911. 15 tablet 0     ondansetron (ZOFRAN-ODT) 4 MG ODT tab Take 1-2 tablets (4-8 mg) by mouth every 8 hours as needed for nausea 4 tablet 0     prasugrel (EFFIENT) 10 MG TABS tablet Take 1 tablet (10 mg) by mouth daily 90 tablet 3     spironolactone (ALDACTONE) 25 MG tablet Take 1 tablet (25 mg) by mouth daily 90 tablet 3       ALLERGIES   No Known Allergies    PAST MEDICAL HISTORY:  Past Medical History:   Diagnosis Date     Arthritis     OA of ankle and hip     Colon polyp      Coronary artery disease involving native coronary artery of native heart without angina pectoris 3/18/2021    coronary angiogram with DESx2 to LAD, residual RPDA disease 3/2021      Dyslipidemia 3/18/2021     ED (erectile dysfunction)      Gastro-oesophageal reflux disease      Hip dislocation, right (H)      Hypertension      Ischemic cardiomyopathy 3/18/2021     OA (osteoarthritis) of ankle      Osteoarthritis of hip      Sleep apnea     no cpap     T2DM (type 2 diabetes mellitus) (H) 3/18/2021     Testicular hypofunction        PAST SURGICAL HISTORY:  Past Surgical History:   Procedure Laterality Date     ANKLE SURGERY Right      ARTHRODESIS ANKLE  12/9/2013    Procedure: ARTHRODESIS ANKLE;;  Surgeon: Venkata Mccallum MD;  Location: [facility]     ARTHROPLASTY ANKLE  12/9/2013     Procedure: ARTHROPLASTY ANKLE;  RIGHT TOTAL ANKLE ARTHROPLASTY, SUBTALAR FUSION, LIGAMENT REPAIR ([place])^;  Surgeon: Venkata Mccallum MD;  Location: [facility]     ARTHROPLASTY HIP  7/23/2014    Procedure: ARTHROPLASTY HIP;  Surgeon: Cirilo Nassar MD;  Location:  OR     ARTHROPLASTY REVISION HIP Right 11/14/2014    Procedure: ARTHROPLASTY REVISION HIP;  Surgeon: Cirilo Nassar MD;  Location:  OR     ARTHROPLASTY REVISION HIP Right 11/9/2015    Procedure: ARTHROPLASTY REVISION HIP;  Surgeon: José Estrella MD;  Location:  OR     ARTHROPLASTY REVISION HIP Right 2/1/2017    Procedure: ARTHROPLASTY REVISION HIP;  Surgeon: Cirilo Nassar MD;  Location:  OR     CHOLECYSTECTOMY       CHOLECYSTECTOMY  2000     COLONOSCOPY       CV CORONARY ANGIOGRAM N/A 3/9/2021    Procedure: Coronary Angiogram;  Surgeon: Dilip Zamora MD;  Location:  HEART CARDIAC CATH LAB     CV INSTANTANEOUS WAVE-FREE RATIO N/A 3/9/2021    Procedure: Instantaneous Wave-Free Ratio;  Surgeon: Dilip Zamora MD;  Location:  HE[facility]ARD[facility] CATH LAB     CV INTRAVASULAR ULTRASOUND N/A 3/9/2021    Procedure: Intravascular Ultrasound;  Surgeon: Dilip Zamora MD;  Location:  HE[facility] CARDI[facility] CATH LAB     CV PCI STENT DRUG ELUTING N/A 3/9/2021    Procedure: Percutaneous Coronary Intervention Stent Drug Eluting;  Surgeon: Dilip Zamora MD;  Location:  HEART CARDIAC CATH LAB     HC CLOSED TX TRAUMATIC HIP DISLOC W/O ANESTH Right 2/15/2015    Procedure: HIP MANIPULATION / CLOSED REDUCTION;  Surgeon: Cliff Boss MD;  Location: [facility];  Service: Orthopedics     JOINT REPLACEMENT       REMOVE HARDWARE ANKLE Right 3/8/2021    Procedure: RIGHT ANKLE STAPLE REMOVAL;  Surgeon: Venkata Mccallum MD;  Location:  OR     REPAIR LIGAMENT ANKLE  12/9/2013    Procedure: REPAIR LIGAMENT ANKLE;;  Surgeon: Venkata Mccallum MD;  Location: [facility]       FAMILY HISTORY:  Family  History   Problem Relation Age of Onset     Heart Failure Mother      Coronary Stenting Mother      Heart Surgery Mother        SOCIAL HISTORY:  Social History     Socioeconomic History     Marital status:      Spouse name: Not on file     Number of children: Not on file     Years of education: Not on file     Highest education level: Not on file   Occupational History     Not on file   Tobacco Use     Smoking status: Never Smoker     Smokeless tobacco: Never Used   Substance and Sexual Activity     Alcohol use: Yes     Comment: 1 can beer a week     Drug use: No     Sexual activity: Not on file   Other Topics Concern     Not on file   Social History Narrative     Not on file     Social Determinants of Health     Financial Resource Strain: Not on file   Food Insecurity: Not on file   Transportation Needs: Not on file   Physical Activity: Not on file   Stress: Not on file   Social Connections: Not on file   Intimate Partner Violence: Not on file   Housing Stability: Not on file       Review of Systems:  Skin:          Eyes:         ENT:         Respiratory:          Cardiovascular:         Gastroenterology:        Genitourinary:         Musculoskeletal:         Neurologic:         Psychiatric:         Heme/Lymph/Imm:         Endocrine:           Physical Exam:  Vitals: There were no vitals taken for this visit.    Constitutional:  cooperative, alert and oriented, well developed, well nourished, in no acute distress        Skin:  warm and dry to the touch, no apparent skin lesions or masses noted          Head:  normocephalic, no masses or lesions        Eyes:  pupils equal and round        Lymph:      ENT:           Neck:  JVP normal        Respiratory:  clear to auscultation         Cardiac: regular rhythm                                                         GI:  abdomen soft        Extremities and Muscular Skeletal:  no deformities, clubbing, cyanosis, erythema observed              Neurological:  no  gross motor deficits        Psych:  Alert and Oriented x 3        CC  Shagufta Wu MD  [id number]  [place] [id number]

## 2022-01-22 ENCOUNTER — HEALTH MAINTENANCE LETTER (OUTPATIENT)
Age: 67
End: 2022-01-22

## 2022-01-24 ENCOUNTER — HOSPITAL ENCOUNTER (OUTPATIENT)
Dept: ULTRASOUND IMAGING | Facility: CLINIC | Age: 67
End: 2022-01-24
Attending: INTERNAL MEDICINE
Payer: COMMERCIAL

## 2022-01-24 ENCOUNTER — HOSPITAL ENCOUNTER (OUTPATIENT)
Dept: CARDIOLOGY | Facility: CLINIC | Age: 67
End: 2022-01-24
Attending: INTERNAL MEDICINE
Payer: COMMERCIAL

## 2022-01-24 ENCOUNTER — TELEPHONE (OUTPATIENT)
Dept: CARDIOLOGY | Facility: CLINIC | Age: 67
End: 2022-01-24

## 2022-01-24 DIAGNOSIS — R55 SYNCOPE, UNSPECIFIED SYNCOPE TYPE: ICD-10-CM

## 2022-01-24 PROCEDURE — 93246 EXT ECG>7D<15D RECORDING: CPT

## 2022-01-24 PROCEDURE — 93880 EXTRACRANIAL BILAT STUDY: CPT

## 2022-01-24 PROCEDURE — 93880 EXTRACRANIAL BILAT STUDY: CPT | Mod: 26 | Performed by: INTERNAL MEDICINE

## 2022-01-24 PROCEDURE — 93248 EXT ECG>7D<15D REV&INTERPJ: CPT | Performed by: INTERNAL MEDICINE

## 2022-01-24 NOTE — TELEPHONE ENCOUNTER
US carotid 1/24/2022 noted. Order for c/o 2 episodes of previous dizziness.  14-day ziopatch started today.    US carotid:   No hemodynamically significant obstructive carotid artery disease bilaterally (less than 50% stenosis by Doppler).    Called patient with update on US results. He was pleased with the news.    Will message Dr. Wu to review

## 2022-02-18 ENCOUNTER — TELEPHONE (OUTPATIENT)
Dept: CARDIOLOGY | Facility: CLINIC | Age: 67
End: 2022-02-18
Payer: COMMERCIAL

## 2022-02-18 DIAGNOSIS — I10 BENIGN ESSENTIAL HYPERTENSION: Primary | ICD-10-CM

## 2022-02-18 DIAGNOSIS — I25.5 ISCHEMIC CARDIOMYOPATHY: ICD-10-CM

## 2022-02-18 NOTE — TELEPHONE ENCOUNTER
Per Dr. Mishra's reply, Monique called. Monitor results reviewed.  Patient states he is feeling well. Unaware of PVC's.   Denies symptoms of light headedness, dizziness, near syncope or shortness of breath.  Patient agrees to F/Up LINA visit in 3 months.  Patient to call scheduling to arrange.

## 2022-02-18 NOTE — TELEPHONE ENCOUNTER
Leadless monitor 1-24-22 to 2-7-22.   Principal rhythm SR average rate. 62 bpm, range 42 to 182 bpm  3 runs VT, longest run 15 beats - max rate 182. PVC's 2.1% burden.    Last Dr. Wu visit 1-22-22 - 2 episodes of sudden onset dizziness and lightheadedness that occurred in October and then early November.  They occurred while he was sitting and did not progress to daren syncope.  - ordered a carotid ultrasound as well as a Zio patch monitor to rule out any significant tacky or bradycardia arrhythmias that could have contributed to his symptoms.    Carotid US done 1-24-22 =   No hemodynamically significant obstructive carotid artery disease  bilaterally (less than 50% stenosis by Doppler).    No f/up visit scheduled at this time.

## 2022-03-14 NOTE — PROGRESS NOTES
HPI and Plan:   See dictation    No orders of the defined types were placed in this encounter.      No orders of the defined types were placed in this encounter.      There are no discontinued medications.      No diagnosis found.    CURRENT MEDICATIONS:  Current Outpatient Medications   Medication Sig Dispense Refill     amLODIPine (NORVASC) 5 MG tablet Take 5 mg by mouth daily        aspirin (ASA) 81 MG EC tablet Take 1 tablet (81 mg) by mouth daily 30 tablet 0     atorvastatin (LIPITOR) 80 MG tablet Take 1 tablet (80 mg) by mouth At Bedtime 30 tablet 3     carvedilol (COREG) 3.125 MG tablet Take 1 tablet (3.125 mg) by mouth 2 times daily (with meals) 60 tablet 3     HYDROcodone-acetaminophen (NORCO) 5-325 MG tablet Take 1-2 tablets by mouth every 4 hours as needed for moderate to severe pain 10 tablet 0     losartan (COZAAR) 100 MG tablet Take 100 mg by mouth daily        metFORMIN (GLUCOPHAGE-XR) 500 MG 24 hr tablet Take 500 mg by mouth daily (with dinner)       naproxen sodium (ANAPROX) 220 MG tablet Take 220 mg by mouth 2 times daily as needed for moderate pain       nitroGLYcerin (NITROSTAT) 0.4 MG sublingual tablet For chest pain place 1 tablet under the tongue every 5 minutes for 3 doses. If symptoms persist 5 minutes after 1st dose call 911. 15 tablet 0     ondansetron (ZOFRAN-ODT) 4 MG ODT tab Take 1-2 tablets (4-8 mg) by mouth every 8 hours as needed for nausea 4 tablet 0     prasugrel (EFFIENT) 10 MG TABS tablet Take 1 tablet (10 mg) by mouth daily 30 tablet 3     spironolactone (ALDACTONE) 25 MG tablet Take 1 tablet (25 mg) by mouth daily 30 tablet 3       ALLERGIES   No Known Allergies    PAST MEDICAL HISTORY:  Past Medical History:   Diagnosis Date     Arthritis     OA of ankle and hip     Colon polyp      Coronary artery disease involving native coronary artery of native heart without angina pectoris 3/18/2021    coronary angiogram with DESx2 to LAD, residual RPDA disease 3/2021      Dyslipidemia  Health Maintenance Due   Topic Date Due   • DTaP/Tdap/Td Vaccine (2 - Td or Tdap) 01/01/2019   • Shingles Vaccine (1 of 2) Never done   • Influenza Vaccine (1) Never done   • COVID-19 Vaccine (3 - Booster for Pfizer series) 10/29/2021   • Colorectal Cancer Screen-  02/09/2022       Patient is due for topics listed above, she wishes to proceed with Immunization(s) COVID-19 and Colorectal Cancer Screening: iFOBT, but is not proceeding with Immunization(s) Dtap/Tdap/Td, Influenza and Shingles at this time.  fobt kit was given to patient with 2 labels . Patient refused flu vaccine and HC information was given to patient.   3/18/2021     ED (erectile dysfunction)      Gastro-oesophageal reflux disease      Hip dislocation, right (H)      Hypertension      Ischemic cardiomyopathy 3/18/2021     OA (osteoarthritis) of ankle      Osteoarthritis of hip      Sleep apnea     no cpap     T2DM (type 2 diabetes mellitus) (H) 3/18/2021     Testicular hypofunction        PAST SURGICAL HISTORY:  Past Surgical History:   Procedure Laterality Date     ARTHRODESIS ANKLE  12/9/2013    Procedure: ARTHRODESIS ANKLE;;  Surgeon: Venkata Mccallum MD;  [facility]     ARTHROPLASTY ANKLE  12/9/2013    Procedure: ARTHROPLASTY ANKLE;  RIGHT TOTAL ANKLE ARTHROPLASTY, SUBTALAR FUSION, LIGAMENT REPAIR (Tornier VALENCIA)^;  Surgeon: Venkata Mccallum MD;  [facility]     ARTHROPLASTY HIP  7/23/2014    Procedure: ARTHROPLASTY HIP;  Surgeon: Cirilo Nassar MD;  Location:  [facility]     ARTHROPLASTY REVISION HIP Right 11/14/2014    Procedure: ARTHROPLASTY REVISION HIP;  Surgeon: Cirilo Nassar MD;  Location:  [facility]     ARTHROPLASTY REVISION HIP Right 11/9/2015    Procedure: ARTHROPLASTY REVISION HIP;  Surgeon: José Estrella MD;  Location:  OR     ARTHROPLASTY REVISION HIP Right 2/1/2017    Procedure: ARTHROPLASTY REVISION HIP;  Surgeon: Cirilo Nassar MD;  Location:  OR     CHOLECYSTECTOMY       COLONOSCOPY       CV CORONARY ANGIOGRAM N/A 3/9/2021    Procedure: Coronary Angiogram;  Surgeon: Dilip Zamora MD;  Location:  [facility]     CV INSTANTANEOUS WAVE-FREE RATIO N/A 3/9/2021    Procedure: Instantaneous Wave-Free Ratio;  Surgeon: Dilip Zamora MD;  Location:  [facility]     CV INTRAVASULAR ULTRASOUND N/A 3/9/2021    Procedure: Intravascular Ultrasound;  Surgeon: Dilip Zamora MD;  Location:  [facility]     CV PCI STENT DRUG ELUTING N/A 3/9/2021    Procedure: Percutaneous Coronary Intervention Stent Drug Eluting;  Surgeon: Dilip Zamora MD;  Location:   [facility]     REMOVE HARDWARE ANKLE Right 3/8/2021    Procedure: RIGHT ANKLE STAPLE REMOVAL;  Surgeon: Venkata Mccallum MD;  Location:  OR     REPAIR LIGAMENT ANKLE  12/9/2013    Procedure: REPAIR LIGAMENT ANKLE;;  Surgeon: Venkata Mccallum MD;  Location: [facility]       FAMILY HISTORY:  Family History   Problem Relation Age of Onset     Heart Failure Mother      Coronary Stenting Mother      Heart Surgery Mother        SOCIAL HISTORY:  Social History     Socioeconomic History     Marital status:      Spouse name: Not on file     Number of children: Not on file     Years of education: Not on file     Highest education level: Not on file   Occupational History     Not on file   Social Needs     Financial resource strain: Not on file     Food insecurity     Worry: Not on file     Inability: Not on file     Transportation needs     Medical: Not on file     Non-medical: Not on file   Tobacco Use     Smoking status: Never Smoker     Smokeless tobacco: Never Used   Substance and Sexual Activity     Alcohol use: Yes     Comment: 1 can beer a week     Drug use: No     Sexual activity: Not on file   Lifestyle     Physical activity     Days per week: Not on file     Minutes per session: Not on file     Stress: Not on file   Relationships     Social connections     Talks on phone: Not on file     Gets together: Not on file     Attends Jewish service: Not on file     Active member of club or organization: Not on file     Attends meetings of clubs or organizations: Not on file     Relationship status: Not on file     Intimate partner violence     Fear of current or ex partner: Not on file     Emotionally abused: Not on file     Physically abused: Not on file     Forced sexual activity: Not on file   Other Topics Concern     Not on file   Social History Narrative     Not on file       Review of Systems:  Skin:          Eyes:         ENT:         Respiratory:          Cardiovascular:          Gastroenterology:        Genitourinary:         Musculoskeletal:         Neurologic:         Psychiatric:         Heme/Lymph/Imm:         Endocrine:           Physical Exam:  Vitals: There were no vitals taken for this visit.    Constitutional:  cooperative, alert and oriented, well developed, well nourished, in no acute distress        Skin:  warm and dry to the touch, no apparent skin lesions or masses noted          Head:           Eyes:  pupils equal and round        Lymph:      ENT:           Neck:  JVP normal        Respiratory:  clear to auscultation         Cardiac: regular rhythm                                                         GI:           Extremities and Muscular Skeletal:                 Neurological:  no gross motor deficits        Psych:  Alert and Oriented x 3        CC  No referring provider defined for this encounter.

## 2022-05-05 NOTE — PROGRESS NOTES
Admission medication history interview status for the 2/1/2017  admission is complete. See EPIC admission navigator for prior to admission medications     Medication history source reliability:Good    Medication history interview source(s):Patient    Medication history resources (including written lists, pill bottles, clinic record):written list    Primary pharmacy.  [facility]    Additional medication history information not noted on PTA med list :None    Time spent in this activity: 45 minutes    Prior to Admission medications    Medication Sig Last Dose Taking? Auth Provider   ASPIRIN EC PO Take 81 mg by mouth daily 1/25/2017 Yes Reported, Patient   NAPROXEN PO Take 220-440 mg by mouth daily as needed for moderate pain 1/25/2017 at prn Yes Reported, Patient   AmLODIPine Besylate (NORVASC PO) Take 5 mg by mouth daily 2/1/2017 at 0700 Yes Reported, Patient   HYDROCHLOROTHIAZIDE PO Take 25 mg by mouth daily 2/1/2017 at 0700 Yes Reported, Patient   Losartan Potassium (COZAAR PO) Take 100 mg by mouth daily  2/1/2017 at 0700 Yes Reported, Patient            25 feet

## 2022-05-08 ENCOUNTER — HEALTH MAINTENANCE LETTER (OUTPATIENT)
Age: 67
End: 2022-05-08

## 2022-06-01 ENCOUNTER — TELEPHONE (OUTPATIENT)
Dept: CARDIOLOGY | Facility: CLINIC | Age: 67
End: 2022-06-01
Payer: COMMERCIAL

## 2022-06-01 NOTE — TELEPHONE ENCOUNTER
[facility] MEASURES BLOOD PRESSURE RECHECK      Last office visit: 1/10/22    Previous blood pressure: 151/97 mm Hg  Previous heart rate: 64 bpm    Time of visit:     Morning medications were taken at: 8:00 am     Today's blood pressure:  mm Hg  Today's heart rate:  bpm     Home monitor blood pressure: 124/85 mmHg  Home monitor heart rate:  bpm      Additional Comments:       Results routed to:     NAOMIE Saez

## 2022-08-28 ENCOUNTER — HEALTH MAINTENANCE LETTER (OUTPATIENT)
Age: 67
End: 2022-08-28

## 2022-08-30 DIAGNOSIS — I25.110 CORONARY ARTERY DISEASE INVOLVING NATIVE CORONARY ARTERY OF NATIVE HEART WITH UNSTABLE ANGINA PECTORIS (H): ICD-10-CM

## 2022-08-30 DIAGNOSIS — I10 BENIGN ESSENTIAL HYPERTENSION: ICD-10-CM

## 2022-08-30 RX ORDER — ATORVASTATIN CALCIUM 80 MG/1
80 TABLET, FILM COATED ORAL AT BEDTIME
Qty: 90 TABLET | Refills: 1 | Status: SHIPPED | OUTPATIENT
Start: 2022-08-30 | End: 2023-01-10

## 2022-08-30 RX ORDER — SPIRONOLACTONE 25 MG/1
25 TABLET ORAL DAILY
Qty: 90 TABLET | Refills: 0 | Status: SHIPPED | OUTPATIENT
Start: 2022-08-30 | End: 2022-11-29

## 2022-08-30 NOTE — PROGRESS NOTES
[facility] Refill Guideline reviewed.  Medication meets criteria for refill. SARAH Romero RN

## 2022-08-30 NOTE — TELEPHONE ENCOUNTER
Refill request from: pharmacy  Medication requested:  spironolactione  Last office visit:  Dr Wu 2022  Labs/EK2022  Future Appointments   Date Time Provider Department Center   1/10/2023  8:15 AM Shagufta Wu MD [facility] Refill Guideline reviewed. Medication meets criteria for refill. Refill sent.  April Layton RN 22 4:29 PM

## 2022-11-29 DIAGNOSIS — I10 BENIGN ESSENTIAL HYPERTENSION: ICD-10-CM

## 2022-11-29 DIAGNOSIS — I25.110 CORONARY ARTERY DISEASE INVOLVING NATIVE CORONARY ARTERY OF NATIVE HEART WITH UNSTABLE ANGINA PECTORIS (H): ICD-10-CM

## 2022-11-29 RX ORDER — SPIRONOLACTONE 25 MG/1
25 TABLET ORAL DAILY
Qty: 90 TABLET | Refills: 0 | Status: SHIPPED | OUTPATIENT
Start: 2022-11-29 | End: 2023-01-10

## 2022-12-29 ENCOUNTER — TELEPHONE (OUTPATIENT)
Dept: CARDIOLOGY | Facility: CLINIC | Age: 67
End: 2022-12-29

## 2022-12-29 DIAGNOSIS — E78.5 DYSLIPIDEMIA: Primary | ICD-10-CM

## 2022-12-29 NOTE — TELEPHONE ENCOUNTER
Message from chart prep team:  Viri Boykin LPN  [facility] Team 2  Patient is seeing Dr. Wu on 1/10/23. He is overdue for a 3 month follow-up that should've been in May, but now is one year from last visit. There is a BMP from 6/10/22 but last Lipid was in November 2021. Please place order for Lipids if needed and send to scheduling.     Thank you,   ANY Meredith     Order placed for annual flp and patient is taking lipitor.  Message sent to scheduling

## 2023-01-06 NOTE — PROGRESS NOTES
HPI and Plan:     I had the pleasure of seeing Mr. Ramos in follow up at the [facility] today.  He is a very pleasant, 67-year-old gentleman whom I last saw in 01/2022.     The patient had originally been seen in early 2021 after he had undergone removal of a loose staple post right ankle repair and postoperatively started experiencing substernal chest discomfort.  At the time I saw him, I ordered an echocardiogram, which demonstrated severe left ventricular dysfunction with an ejection fraction of 25%-30%.  This appeared to be global in nature, but given these findings, I recommended a coronary angiography, which was performed on 03/09/2021.       The patient was found to have 2 vessel obstructive coronary artery disease with a diffuse mid to distal LAD stenosis that was revascularized with 2 drug-eluting stents.  He also was noted to have a focal 70% stenosis in the RPDA, which was thought to be appropriate for medical therapy initially.  He was also initiated on appropriate medical therapy for his cardiomyopathy.    From a cardiovascular standpoint he underwent a stress cardiovascular MRI on 6/2/2021.  This demonstrated moderately reduced left ventricular systolic function with a calculated LVEF of 36%, no ischemia on stress perfusion imaging and subendocardial enhancement consistent with a prior infarct in the circumflex distribution.    At the time of his office visit in July 2021, I also recommended the addition of dapagliflozin given his type 2 diabetes and moderately reduced left ventricular systolic function.  His carvedilol was also increased to 6.25 mg twice a day.  Unfortunately the dapagliflozin was prohibitive from a cost standpoint and he did not end up taking this.    Today he presents with concerns regarding exertional dyspnea and chest discomfort over the past 6 months.  He states that he notices the chest discomfort and shortness of breath upon more than mild exertion and it  appears to have become more noticeable and prolonged over the past few months.  The symptoms are relieved by rest.  He has not taken nitroglycerin for this.    He remains fully compliant with his medical therapy.     PHYSICAL EXAMINATION:  Dictated below.    A cardiovascular MRI without contrast on 1/4/2022 demonstrated mildly to moderately reduced left ventricular systolic function with a quantitative LVEF of 43%.  Right ventricular systolic function was normal.    A lipid panel on 1/9/2023 demonstrated excellent LDL control with an LDL of 48 mg/dL, HDL 53 and triglycerides 144.  Most recent A1c of the summer was 6.3.       IMPRESSION:    1.  Coronary artery disease, status post drug-eluting stent placement to the LAD as described above in 03/2021.  Remains on aspirin 81 mg daily.   2.  Residual moderate RPDA stenosis, which was not intervened on at the time of his initial angiogram.   3.  Presumed ischemic cardiomyopathy with evidence of improvement in left ventricular systolic function on his most recent cardiovascular MRI.  4.  Exertional dyspnea and chest discomfort over the past 6 months which appears to be more noticeable over the past few months.  The symptoms are worrisome for progressive coronary artery disease.  5.  Diabetes mellitus.  6.  Hypertension.  7.  Obstructive sleep apnea.    PLAN    Mr. Ramos presents today with concerns regarding exertional dyspnea and occasional chest discomfort which is worrisome for progressive coronary artery disease.  I have recommended we proceed to coronary angiography for repeat assessment of his coronary anatomy and vascularization if clinically appropriate.  I have explained the indications, risks and benefits of coronary angiography to the patient in detail who is agreeable with proceeding.    An echocardiogram will also be obtained for a reassessment of left ventricular systolic function prior to coronary angiography.    It was a pleasure seeing him  today.    Medical decision making was of high complexity given the decision to proceed to coronary angiography given accelerating dyspnea on exertion and chest discomfort the past few months.                  CURRENT MEDICATIONS:  Current Outpatient Medications   Medication Sig Dispense Refill     amLODIPine (NORVASC) 5 MG tablet Take 5 mg by mouth daily        aspirin (ASA) 81 MG EC tablet Take 1 tablet (81 mg) by mouth daily 30 tablet 0     atorvastatin (LIPITOR) 80 MG tablet Take 1 tablet (80 mg) by mouth At Bedtime 90 tablet 1     carvedilol (COREG) 12.5 MG tablet Take 1 tablet (12.5 mg) by mouth 2 times daily (with meals) 180 tablet 3     HYDROcodone-acetaminophen (NORCO) 5-325 MG tablet Take 1-2 tablets by mouth every 4 hours as needed for moderate to severe pain 10 tablet 0     losartan (COZAAR) 100 MG tablet Take 100 mg by mouth daily        metFORMIN (GLUCOPHAGE-XR) 500 MG 24 hr tablet Take 500 mg by mouth daily (with dinner)       naproxen sodium (ANAPROX) 220 MG tablet Take 220 mg by mouth 2 times daily as needed for moderate pain       nitroGLYcerin (NITROSTAT) 0.4 MG sublingual tablet For chest pain place 1 tablet under the tongue every 5 minutes for 3 doses. If symptoms persist 5 minutes after 1st dose call 911. 15 tablet 0     ondansetron (ZOFRAN-ODT) 4 MG ODT tab Take 1-2 tablets (4-8 mg) by mouth every 8 hours as needed for nausea 4 tablet 0     prasugrel (EFFIENT) 10 MG TABS tablet Take 1 tablet (10 mg) by mouth daily 90 tablet 3     spironolactone (ALDACTONE) 25 MG tablet Take 1 tablet (25 mg) by mouth daily 90 tablet 0       ALLERGIES   No Known Allergies    PAST MEDICAL HISTORY:  Past Medical History:   Diagnosis Date     Arthritis     OA of ankle and hip     Colon polyp      Coronary artery disease involving native coronary artery of native heart without angina pectoris 3/18/2021    coronary angiogram with DESx2 to LAD, residual RPDA disease 3/2021      Dyslipidemia 3/18/2021     ED (erectile  dysfunction)      Gastro-oesophageal reflux disease      Hip dislocation, right (H)      Hypertension      Ischemic cardiomyopathy 3/18/2021     OA (osteoarthritis) of ankle      Osteoarthritis of hip      Sleep apnea     no cpap     T2DM (type 2 diabetes mellitus) (H) 3/18/2021     Testicular hypofunction        PAST SURGICAL HISTORY:  Past Surgical History:   Procedure Laterality Date     ANKLE SURGERY Right      ARTHRODESIS ANKLE  12/9/2013    Procedure: ARTHRODESIS ANKLE;;  Surgeon: Venkata Mccallum MD;  [facility]     ARTHROPLASTY ANKLE  12/9/2013    Procedure: ARTHROPLASTY ANKLE;  RIGHT TOTAL ANKLE ARTHROPLASTY, SUBTALAR FUSION, LIGAMENT REPAIR (Tornier VALENCIA)^;  Surgeon: Venkata Mccallum MD;  [facility]     ARTHROPLASTY HIP  7/23/2014    Procedure: ARTHROPLASTY HIP;  Surgeon: Cirilo Nassar MD;  Location:  OR     ARTHROPLASTY REVISION HIP Right 11/14/2014    Procedure: ARTHROPLASTY REVISION HIP;  Surgeon: Cirilo Nassar MD;  Location:  OR     ARTHROPLASTY REVISION HIP Right 11/9/2015    Procedure: ARTHROPLASTY REVISION HIP;  Surgeon: José Estrella MD;  Location:  OR     ARTHROPLASTY REVISION HIP Right 2/1/2017    Procedure: ARTHROPLASTY REVISION HIP;  Surgeon: Cirilo Nassar MD;  Location:  OR     CHOLECYSTECTOMY       CHOLECYSTECTOMY  2000     COLONOSCOPY       CV CORONARY ANGIOGRAM N/A 3/9/2021    Procedure: Coronary Angiogram;  Surgeon: Dilip Zamora MD;  Location:  [facility]     CV INSTANTANEOUS WAVE-FREE RATIO N/A 3/9/2021    Procedure: Instantaneous Wave-Free Ratio;  Surgeon: Dilip Zamora MD;  Location:  [facility]     CV INTRAVASULAR ULTRASOUND N/A 3/9/2021    Procedure: Intravascular Ultrasound;  Surgeon: Dilip Zamora MD;  Location:  [facility]     CV PCI STENT DRUG ELUTING N/A 3/9/2021    Procedure: Percutaneous Coronary Intervention Stent Drug Eluting;  Surgeon: Dilip Zamora  MD Tomás;  Location:  [facility]     HC CLOSED TX TRAUMATIC HIP DISLOC W/O ANESTH Right 2/15/2015    Procedure: HIP MANIPULATION / CLOSED REDUCTION;  Surgeon: Cliff Boss MD;  Location: [facility];  Service: Orthopedics     JOINT REPLACEMENT       REMOVE HARDWARE ANKLE Right 3/8/2021    Procedure: RIGHT ANKLE STAPLE REMOVAL;  Surgeon: Venkata Mccallum MD;  Location:  OR     REPAIR LIGAMENT ANKLE  12/9/2013    Procedure: REPAIR LIGAMENT ANKLE;;  Surgeon: Venkata Mccallum MD;  Location: [facility]       FAMILY HISTORY:  Family History   Problem Relation Age of Onset     Heart Failure Mother      Coronary Stenting Mother      Heart Surgery Mother        SOCIAL HISTORY:  Social History     Socioeconomic History     Marital status:    Tobacco Use     Smoking status: Never     Smokeless tobacco: Never   Substance and Sexual Activity     Alcohol use: Yes     Comment: 1 can beer a week     Drug use: No       Review of Systems:  Skin:          Eyes:         ENT:         Respiratory:          Cardiovascular:         Gastroenterology:        Genitourinary:         Musculoskeletal:         Neurologic:         Psychiatric:         Heme/Lymph/Imm:         Endocrine:           Physical Exam:  Vitals: There were no vitals taken for this visit.    Constitutional:  cooperative, alert and oriented, well developed, well nourished, in no acute distress        Skin:  warm and dry to the touch, no apparent skin lesions or masses noted          Head:  normocephalic, no masses or lesions        Eyes:  pupils equal and round        Lymph:      ENT:  no pallor or cyanosis, dentition good        Neck:  JVP normal        Respiratory:  clear to auscultation         Cardiac: regular rhythm                pulses full and equal                                        GI:  abdomen soft        Extremities and Muscular Skeletal:  no edema              Neurological:  affect appropriate        Psych:  Alert  and Oriented x 3        CC  Shagufta Wu MD  [id number]  [place] [id number]

## 2023-01-09 ENCOUNTER — LAB (OUTPATIENT)
Dept: LAB | Facility: CLINIC | Age: 68
End: 2023-01-09
Payer: COMMERCIAL

## 2023-01-09 DIAGNOSIS — E78.5 DYSLIPIDEMIA: ICD-10-CM

## 2023-01-09 LAB
CHOLEST SERPL-MCNC: 130 MG/DL
FASTING STATUS PATIENT QL REPORTED: YES
HDLC SERPL-MCNC: 53 MG/DL
LDLC SERPL CALC-MCNC: 48 MG/DL
NONHDLC SERPL-MCNC: 77 MG/DL
TRIGL SERPL-MCNC: 144 MG/DL

## 2023-01-09 PROCEDURE — 80061 LIPID PANEL: CPT | Performed by: INTERNAL MEDICINE

## 2023-01-09 PROCEDURE — 36415 COLL VENOUS BLD VENIPUNCTURE: CPT | Performed by: INTERNAL MEDICINE

## 2023-01-10 ENCOUNTER — OFFICE VISIT (OUTPATIENT)
Dept: CARDIOLOGY | Facility: CLINIC | Age: 68
End: 2023-01-10
Payer: COMMERCIAL

## 2023-01-10 VITALS
SYSTOLIC BLOOD PRESSURE: 134 MMHG | HEART RATE: 63 BPM | DIASTOLIC BLOOD PRESSURE: 88 MMHG | HEIGHT: 74 IN | BODY MASS INDEX: 40.43 KG/M2 | OXYGEN SATURATION: 95 % | WEIGHT: 315 LBS

## 2023-01-10 DIAGNOSIS — I25.110 CORONARY ARTERY DISEASE INVOLVING NATIVE CORONARY ARTERY OF NATIVE HEART WITH UNSTABLE ANGINA PECTORIS (H): ICD-10-CM

## 2023-01-10 DIAGNOSIS — R93.1 ABNORMAL FINDINGS ON DIAGNOSTIC IMAGING OF HEART AND CORONARY CIRCULATION: Primary | ICD-10-CM

## 2023-01-10 DIAGNOSIS — I25.10 CORONARY ARTERY DISEASE INVOLVING NATIVE CORONARY ARTERY OF NATIVE HEART WITHOUT ANGINA PECTORIS: Primary | ICD-10-CM

## 2023-01-10 DIAGNOSIS — I10 BENIGN ESSENTIAL HYPERTENSION: ICD-10-CM

## 2023-01-10 PROCEDURE — 99215 OFFICE O/P EST HI 40 MIN: CPT | Performed by: INTERNAL MEDICINE

## 2023-01-10 RX ORDER — SPIRONOLACTONE 25 MG/1
25 TABLET ORAL DAILY
Qty: 90 TABLET | Refills: 3 | Status: SHIPPED | OUTPATIENT
Start: 2023-01-10 | End: 2024-02-16

## 2023-01-10 RX ORDER — ATORVASTATIN CALCIUM 80 MG/1
80 TABLET, FILM COATED ORAL AT BEDTIME
Qty: 90 TABLET | Refills: 3 | Status: SHIPPED | OUTPATIENT
Start: 2023-01-10

## 2023-01-10 RX ORDER — CARVEDILOL 12.5 MG/1
12.5 TABLET ORAL 2 TIMES DAILY WITH MEALS
Qty: 180 TABLET | Refills: 3 | Status: SHIPPED | OUTPATIENT
Start: 2023-01-10 | End: 2023-03-09

## 2023-01-10 NOTE — LETTER
1/10/2023    Dilip Ellis MD  [address] [facility] [address]    RE: Paras Ramos       Dear Colleague,     I had the pleasure of seeing Paras Ramos in the [facility].  HPI and Plan:     I had the pleasure of seeing Mr. Ramos in follow up at the [facility] today.  He is a very pleasant, 67-year-old gentleman whom I last saw in 01/2022.     The patient had originally been seen in early 2021 after he had undergone removal of a loose staple post right ankle repair and postoperatively started experiencing substernal chest discomfort.  At the time I saw him, I ordered an echocardiogram, which demonstrated severe left ventricular dysfunction with an ejection fraction of 25%-30%.  This appeared to be global in nature, but given these findings, I recommended a coronary angiography, which was performed on 03/09/2021.       The patient was found to have 2 vessel obstructive coronary artery disease with a diffuse mid to distal LAD stenosis that was revascularized with 2 drug-eluting stents.  He also was noted to have a focal 70% stenosis in the RPDA, which was thought to be appropriate for medical therapy initially.  He was also initiated on appropriate medical therapy for his cardiomyopathy.    From a cardiovascular standpoint he underwent a stress cardiovascular MRI on 6/2/2021.  This demonstrated moderately reduced left ventricular systolic function with a calculated LVEF of 36%, no ischemia on stress perfusion imaging and subendocardial enhancement consistent with a prior infarct in the circumflex distribution.    At the time of his office visit in July 2021, I also recommended the addition of dapagliflozin given his type 2 diabetes and moderately reduced left ventricular systolic function.  His carvedilol was also increased to 6.25 mg twice a day.  Unfortunately the dapagliflozin was prohibitive from a cost standpoint and he did not end up taking this.    Today  he presents with concerns regarding exertional dyspnea and chest discomfort over the past 6 months.  He states that he notices the chest discomfort and shortness of breath upon more than mild exertion and it appears to have become more noticeable and prolonged over the past few months.  The symptoms are relieved by rest.  He has not taken nitroglycerin for this.    He remains fully compliant with his medical therapy.     PHYSICAL EXAMINATION:  Dictated below.    A cardiovascular MRI without contrast on 1/4/2022 demonstrated mildly to moderately reduced left ventricular systolic function with a quantitative LVEF of 43%.  Right ventricular systolic function was normal.    A lipid panel on 1/9/2023 demonstrated excellent LDL control with an LDL of 48 mg/dL, HDL 53 and triglycerides 144.  Most recent A1c of the summer was 6.3.       IMPRESSION:    1.  Coronary artery disease, status post drug-eluting stent placement to the LAD as described above in 03/2021.  Remains on aspirin 81 mg daily.   2.  Residual moderate RPDA stenosis, which was not intervened on at the time of his initial angiogram.   3.  Presumed ischemic cardiomyopathy with evidence of improvement in left ventricular systolic function on his most recent cardiovascular MRI.  4.  Exertional dyspnea and chest discomfort over the past 6 months which appears to be more noticeable over the past few months.  The symptoms are worrisome for progressive coronary artery disease.  5.  Diabetes mellitus.  6.  Hypertension.  7.  Obstructive sleep apnea.    PLAN    Mr. Ramos presents today with concerns regarding exertional dyspnea and occasional chest discomfort which is worrisome for progressive coronary artery disease.  I have recommended we proceed to coronary angiography for repeat assessment of his coronary anatomy and vascularization if clinically appropriate.  I have explained the indications, risks and benefits of coronary angiography to the patient in detail  who is agreeable with proceeding.    An echocardiogram will also be obtained for a reassessment of left ventricular systolic function prior to coronary angiography.    It was a pleasure seeing him today.    Medical decision making was of high complexity given the decision to proceed to coronary angiography given accelerating dyspnea on exertion and chest discomfort the past few months.                  CURRENT MEDICATIONS:  Current Outpatient Medications   Medication Sig Dispense Refill     amLODIPine (NORVASC) 5 MG tablet Take 5 mg by mouth daily        aspirin (ASA) 81 MG EC tablet Take 1 tablet (81 mg) by mouth daily 30 tablet 0     atorvastatin (LIPITOR) 80 MG tablet Take 1 tablet (80 mg) by mouth At Bedtime 90 tablet 1     carvedilol (COREG) 12.5 MG tablet Take 1 tablet (12.5 mg) by mouth 2 times daily (with meals) 180 tablet 3     HYDROcodone-acetaminophen (NORCO) 5-325 MG tablet Take 1-2 tablets by mouth every 4 hours as needed for moderate to severe pain 10 tablet 0     losartan (COZAAR) 100 MG tablet Take 100 mg by mouth daily        metFORMIN (GLUCOPHAGE-XR) 500 MG 24 hr tablet Take 500 mg by mouth daily (with dinner)       naproxen sodium (ANAPROX) 220 MG tablet Take 220 mg by mouth 2 times daily as needed for moderate pain       nitroGLYcerin (NITROSTAT) 0.4 MG sublingual tablet For chest pain place 1 tablet under the tongue every 5 minutes for 3 doses. If symptoms persist 5 minutes after 1st dose call 911. 15 tablet 0     ondansetron (ZOFRAN-ODT) 4 MG ODT tab Take 1-2 tablets (4-8 mg) by mouth every 8 hours as needed for nausea 4 tablet 0     prasugrel (EFFIENT) 10 MG TABS tablet Take 1 tablet (10 mg) by mouth daily 90 tablet 3     spironolactone (ALDACTONE) 25 MG tablet Take 1 tablet (25 mg) by mouth daily 90 tablet 0       ALLERGIES   No Known Allergies    PAST MEDICAL HISTORY:  Past Medical History:   Diagnosis Date     Arthritis     OA of ankle and hip     Colon polyp      Coronary artery disease  involving native coronary artery of native heart without angina pectoris 3/18/2021    coronary angiogram with DESx2 to LAD, residual RPDA disease 3/2021      Dyslipidemia 3/18/2021     ED (erectile dysfunction)      Gastro-oesophageal reflux disease      Hip dislocation, right (H)      Hypertension      Ischemic cardiomyopathy 3/18/2021     OA (osteoarthritis) of ankle      Osteoarthritis of hip      Sleep apnea     no cpap     T2DM (type 2 diabetes mellitus) (H) 3/18/2021     Testicular hypofunction        PAST SURGICAL HISTORY:  Past Surgical History:   Procedure Laterality Date     ANKLE SURGERY Right      ARTHRODESIS ANKLE  12/9/2013    Procedure: ARTHRODESIS ANKLE;;  Surgeon: Venkata Mccallum MD;  [facility]:  [facility]     ARTHROPLASTY ANKLE  12/9/2013    Procedure: ARTHROPLASTY ANKLE;  RIGHT TOTAL ANKLE ARTHROPLASTY, SUBTALAR FUSION, LIGAMENT REPAIR (Tornier VALENCIA)^;  Surgeon: Venkata Mccallum MD;  Location:  [facility]     ARTHROPLASTY HIP  7/23/2014    Procedure: ARTHROPLASTY HIP;  Surgeon: Cirilo Nasasr MD;  Location:  [facility]     ARTHROPLASTY REVISION HIP Right 11/14/2014    Procedure: ARTHROPLASTY REVISION HIP;  Surgeon: Cirilo Nassar MD;  Location:  OR     ARTHROPLASTY REVISION HIP Right 11/9/2015    Procedure: ARTHROPLASTY REVISION HIP;  Surgeon: José Estrella MD;  Location:  OR     ARTHROPLASTY REVISION HIP Right 2/1/2017    Procedure: ARTHROPLASTY REVISION HIP;  Surgeon: Cirilo Nassar MD;  Location:  OR     CHOLECYSTECTOMY       CHOLECYSTECTOMY  2000     COLONOSCOPY       CV CORONARY ANGIOGRAM N/A 3/9/2021    Procedure: Coronary Angiogram;  Surgeon: Dilip Zamora MD;  Location:  [facility]ARD[facility]ATH [facility]     CV INSTANTANEOUS WAVE-FREE RATIO N/A 3/9/2021    Procedure: Instantaneous Wave-Free Ratio;  Surgeon: Dilip Zamora MD;  Location:  [facility]     CV INTRAVASULAR ULTRASOUND N/A 3/9/2021    Procedure: Intravascular Ultrasound;  Surgeon: Noah  Dilip England MD;  Location:  [facility]     CV PCI STENT DRUG ELUTING N/A 3/9/2021    Procedure: Percutaneous Coronary Intervention Stent Drug Eluting;  Surgeon: Dilip Zamora MD;  Location:  HEART CARD[facility] CATH [facility]     HC CLOSED TX TRAUMATIC HIP DISLOC W/O ANESTH Right 2/15/2015    Procedure: HIP MANIPULATION / CLOSED REDUCTION;  Surgeon: Cliff Boss MD;  Location: [facility] Main OR;  Service: Orthopedics     JOINT REPLACEMENT       REMOVE HARDWARE ANKLE Right 3/8/2021    Procedure: RIGHT ANKLE STAPLE REMOVAL;  Surgeon: Venkata Mccallum MD;  Location:  OR     REPAIR LIGAMENT ANKLE  12/9/2013    Procedure: REPAIR LIGAMENT ANKLE;;  Surgeon: Venkata Mccallum MD;  Location: [facility]       FAMILY HISTORY:  Family History   Problem Relation Age of Onset     Heart Failure Mother      Coronary Stenting Mother      Heart Surgery Mother        SOCIAL HISTORY:  Social History     Socioeconomic History     Marital status:    Tobacco Use     Smoking status: Never     Smokeless tobacco: Never   Substance and Sexual Activity     Alcohol use: Yes     Comment: 1 can beer a week     Drug use: No       Review of Systems:  Skin:          Eyes:         ENT:         Respiratory:          Cardiovascular:         Gastroenterology:        Genitourinary:         Musculoskeletal:         Neurologic:         Psychiatric:         Heme/Lymph/Imm:         Endocrine:           Physical Exam:  Vitals: There were no vitals taken for this visit.    Constitutional:  cooperative, alert and oriented, well developed, well nourished, in no acute distress        Skin:  warm and dry to the touch, no apparent skin lesions or masses noted          Head:  normocephalic, no masses or lesions        Eyes:  pupils equal and round        Lymph:      ENT:  no pallor or cyanosis, dentition good        Neck:  JVP normal        Respiratory:  clear to auscultation         Cardiac: regular rhythm                 pulses full and equal                                        GI:  abdomen soft        Extremities and Muscular Skeletal:  no edema              Neurological:  affect appropriate        Psych:  Alert and Oriented x 3        CC  Shagufta Wu MD  [address]  [place] [address]    Thank you for allowing me to participate in the care of your patient.      Sincerely,     Shagufta Wu MD     [facility]

## 2023-01-14 ENCOUNTER — HEALTH MAINTENANCE LETTER (OUTPATIENT)
Age: 68
End: 2023-01-14

## 2023-01-24 ENCOUNTER — TELEPHONE (OUTPATIENT)
Dept: CARDIOLOGY | Facility: CLINIC | Age: 68
End: 2023-01-24
Payer: COMMERCIAL

## 2023-01-24 DIAGNOSIS — R93.1 ABNORMAL FINDINGS ON DIAGNOSTIC IMAGING OF HEART AND CORONARY CIRCULATION: Primary | ICD-10-CM

## 2023-01-24 RX ORDER — ASPIRIN 81 MG/1
243 TABLET, CHEWABLE ORAL ONCE
Status: CANCELLED | OUTPATIENT
Start: 2023-01-24

## 2023-01-24 RX ORDER — LIDOCAINE 40 MG/G
CREAM TOPICAL
Status: CANCELLED | OUTPATIENT
Start: 2023-01-24

## 2023-01-24 RX ORDER — ASPIRIN 325 MG
325 TABLET ORAL ONCE
Status: CANCELLED | OUTPATIENT
Start: 2023-01-24 | End: 2023-01-24

## 2023-01-24 RX ORDER — SODIUM CHLORIDE 9 MG/ML
INJECTION, SOLUTION INTRAVENOUS CONTINUOUS
Status: CANCELLED | OUTPATIENT
Start: 2023-01-24

## 2023-01-24 RX ORDER — POTASSIUM CHLORIDE 1500 MG/1
20 TABLET, EXTENDED RELEASE ORAL
Status: CANCELLED | OUTPATIENT
Start: 2023-01-24

## 2023-01-24 NOTE — TELEPHONE ENCOUNTER
Coronary angiogram/PCI/Right Heart Cath prep instructions.     Patient is scheduled for a Coronary Angiogram at [facility] - [address], [place] [address] - Main Entrance of the Hospital, on Friday 1/27/23.  Check in time is at 0730 and procedure to follow.    Patient instructed to remain NPO for solid foods 8 hours prior to arrival and may have clear liquids up to 2 hours prior to arrival.    Patient does not require extra fluids prior to procedure.    Patient is on metformin and has been advised to hold Metformin the day of the procedure. They should continue to hold until after follow up BMP scheduled Monday 1/31/22 is reviewed.  Pt will be called and advised when they can resume their metformin.    Patient is not on anticoagulation.    Patient is taking spironolactone and has been advised to hold this the morning of the procedure.    Patient is taking ASA 81mg daily and will take 4 tabs (324mg) the morning of the procedure.    Pt is not on a SGLT2 inhibitor.    Patient advised to take their other daily medications the morning of the procedure with small sips of water.     Verified patient does not have a contrast allergy.    Verified patient has someone available to drive them home from the hospital and can stay with them for 24 hours after the procedure.     Patient advised to notify care team with any new COVID like symptoms prior to procedure.    Patient will check their temperature the morning of procedure and call [facility] at [phone] if temp is >100.0.    Patient is aware of visitor policy.    Patient expresses understanding of above instructions and denies further questions at this time.

## 2023-01-25 ENCOUNTER — HOSPITAL ENCOUNTER (OUTPATIENT)
Dept: CARDIOLOGY | Facility: CLINIC | Age: 68
Discharge: HOME | End: 2023-01-25
Attending: INTERNAL MEDICINE | Admitting: INTERNAL MEDICINE
Payer: COMMERCIAL

## 2023-01-25 ENCOUNTER — TELEPHONE (OUTPATIENT)
Dept: CARDIOLOGY | Facility: CLINIC | Age: 68
End: 2023-01-25

## 2023-01-25 DIAGNOSIS — I25.110 CORONARY ARTERY DISEASE INVOLVING NATIVE CORONARY ARTERY OF NATIVE HEART WITH UNSTABLE ANGINA PECTORIS (H): ICD-10-CM

## 2023-01-25 LAB — LVEF ECHO: NORMAL

## 2023-01-25 PROCEDURE — 255N000002 HC RX 255 OP 636: Performed by: INTERNAL MEDICINE

## 2023-01-25 PROCEDURE — 999N000208 ECHOCARDIOGRAM COMPLETE

## 2023-01-25 PROCEDURE — 93306 TTE W/DOPPLER COMPLETE: CPT | Mod: 26 | Performed by: INTERNAL MEDICINE

## 2023-01-25 RX ADMIN — HUMAN ALBUMIN MICROSPHERES AND PERFLUTREN 9 ML: 10; .22 INJECTION, SOLUTION INTRAVENOUS at 08:49

## 2023-01-25 NOTE — TELEPHONE ENCOUNTER
Echo completed as below, ordered by Dr Wu for re-assessment of patient's LV function. Angiogram is scheduled for Friday 1/27. Routed to provider to review.       Technically difficult study due to body habitus.  Extremely difficult acoustic windows despite the use of contrast for  endocardial border definition.  The left ventricle is not well visualized.  Left ventricular systolic function is normal.  The visual ejection fraction is 55-60%.  Regional wall motion abnormalities cannot be excluded due to limited  visualization.  Right ventricular function cannot be assessed due to poor image quality.  Atria and cardiac valves not well visualized.  No significant valve disease based on Doppler data.  Inferior vena cava not well visualized.     On previous study dated 3/8/2021, LVEF was reported as 25-30%.

## 2023-01-26 NOTE — TELEPHONE ENCOUNTER
Spoke to patient, revieweed echo report. All questions answered. Angiogram is scheduled for tomorrow.

## 2023-01-27 ENCOUNTER — APPOINTMENT (OUTPATIENT)
Dept: CT IMAGING | Facility: CLINIC | Age: 68
End: 2023-01-27
Attending: INTERNAL MEDICINE
Payer: COMMERCIAL

## 2023-01-27 ENCOUNTER — HOSPITAL ENCOUNTER (OUTPATIENT)
Facility: CLINIC | Age: 68
Discharge: HOME | End: 2023-01-28
Admitting: INTERNAL MEDICINE
Payer: COMMERCIAL

## 2023-01-27 DIAGNOSIS — Z98.890 STATUS POST CORONARY ANGIOGRAM: Primary | ICD-10-CM

## 2023-01-27 DIAGNOSIS — R93.1 ABNORMAL FINDINGS ON DIAGNOSTIC IMAGING OF HEART AND CORONARY CIRCULATION: ICD-10-CM

## 2023-01-27 PROBLEM — S30.1XXA GROIN HEMATOMA: Status: ACTIVE | Noted: 2023-01-27

## 2023-01-27 LAB
ACT BLD: 429 SECONDS (ref 74–150)
ANION GAP SERPL CALCULATED.3IONS-SCNC: 11 MMOL/L (ref 7–15)
APTT PPP: 29 SECONDS (ref 22–38)
BUN SERPL-MCNC: 19.3 MG/DL (ref 8–23)
CALCIUM SERPL-MCNC: 9.2 MG/DL (ref 8.8–10.2)
CHLORIDE SERPL-SCNC: 101 MMOL/L (ref 98–107)
CHOLEST SERPL-MCNC: 116 MG/DL
CREAT SERPL-MCNC: 0.96 MG/DL (ref 0.67–1.17)
DEPRECATED HCO3 PLAS-SCNC: 26 MMOL/L (ref 22–29)
ERYTHROCYTE [DISTWIDTH] IN BLOOD BY AUTOMATED COUNT: 12.7 % (ref 10–15)
GFR SERPL CREATININE-BSD FRML MDRD: 86 ML/MIN/1.73M2
GLUCOSE SERPL-MCNC: 125 MG/DL (ref 70–99)
HCT VFR BLD AUTO: 42.9 % (ref 40–53)
HDLC SERPL-MCNC: 41 MG/DL
HGB BLD-MCNC: 14 G/DL (ref 13.3–17.7)
HGB BLD-MCNC: 14.1 G/DL (ref 13.3–17.7)
HGB BLD-MCNC: 14.3 G/DL (ref 13.3–17.7)
INR PPP: 1.02 (ref 0.85–1.15)
LDLC SERPL CALC-MCNC: 41 MG/DL
MCH RBC QN AUTO: 31.1 PG (ref 26.5–33)
MCHC RBC AUTO-ENTMCNC: 33.3 G/DL (ref 31.5–36.5)
MCV RBC AUTO: 93 FL (ref 78–100)
NONHDLC SERPL-MCNC: 75 MG/DL
PLATELET # BLD AUTO: 259 10E3/UL (ref 150–450)
POTASSIUM SERPL-SCNC: 4.4 MMOL/L (ref 3.4–5.3)
RBC # BLD AUTO: 4.6 10E6/UL (ref 4.4–5.9)
SODIUM SERPL-SCNC: 138 MMOL/L (ref 136–145)
TRIGL SERPL-MCNC: 172 MG/DL
WBC # BLD AUTO: 8.3 10E3/UL (ref 4–11)

## 2023-01-27 PROCEDURE — 250N000011 HC RX IP 250 OP 636

## 2023-01-27 PROCEDURE — 258N000003 HC RX IP 258 OP 636: Performed by: INTERNAL MEDICINE

## 2023-01-27 PROCEDURE — C1874 STENT, COATED/COV W/DEL SYS: HCPCS | Performed by: INTERNAL MEDICINE

## 2023-01-27 PROCEDURE — 85018 HEMOGLOBIN: CPT | Performed by: HOSPITALIST

## 2023-01-27 PROCEDURE — 272N000001 HC OR GENERAL SUPPLY STERILE: Performed by: INTERNAL MEDICINE

## 2023-01-27 PROCEDURE — 85610 PROTHROMBIN TIME: CPT | Performed by: INTERNAL MEDICINE

## 2023-01-27 PROCEDURE — 93005 ELECTROCARDIOGRAM TRACING: CPT

## 2023-01-27 PROCEDURE — 250N000013 HC RX MED GY IP 250 OP 250 PS 637: Performed by: INTERNAL MEDICINE

## 2023-01-27 PROCEDURE — C1760 CLOSURE DEV, VASC: HCPCS | Performed by: INTERNAL MEDICINE

## 2023-01-27 PROCEDURE — C1887 CATHETER, GUIDING: HCPCS | Performed by: INTERNAL MEDICINE

## 2023-01-27 PROCEDURE — 250N000011 HC RX IP 250 OP 636: Performed by: INTERNAL MEDICINE

## 2023-01-27 PROCEDURE — 999N000127 HC STATISTIC PERIPHERAL IV START W US GUIDANCE

## 2023-01-27 PROCEDURE — C9600 PERC DRUG-EL COR STENT SING: HCPCS | Performed by: INTERNAL MEDICINE

## 2023-01-27 PROCEDURE — 93454 CORONARY ARTERY ANGIO S&I: CPT | Mod: 26 | Performed by: INTERNAL MEDICINE

## 2023-01-27 PROCEDURE — 85347 COAGULATION TIME ACTIVATED: CPT

## 2023-01-27 PROCEDURE — 999N000054 HC STATISTIC EKG NON-CHARGEABLE

## 2023-01-27 PROCEDURE — 99152 MOD SED SAME PHYS/QHP 5/>YRS: CPT | Performed by: INTERNAL MEDICINE

## 2023-01-27 PROCEDURE — 36591 DRAW BLOOD OFF VENOUS DEVICE: CPT

## 2023-01-27 PROCEDURE — 99153 MOD SED SAME PHYS/QHP EA: CPT | Performed by: INTERNAL MEDICINE

## 2023-01-27 PROCEDURE — 36415 COLL VENOUS BLD VENIPUNCTURE: CPT | Performed by: INTERNAL MEDICINE

## 2023-01-27 PROCEDURE — C1725 CATH, TRANSLUMIN NON-LASER: HCPCS | Performed by: INTERNAL MEDICINE

## 2023-01-27 PROCEDURE — 250N000009 HC RX 250: Performed by: INTERNAL MEDICINE

## 2023-01-27 PROCEDURE — 85027 COMPLETE CBC AUTOMATED: CPT | Performed by: INTERNAL MEDICINE

## 2023-01-27 PROCEDURE — 74174 CTA ABD&PLVS W/CONTRAST: CPT

## 2023-01-27 PROCEDURE — 85730 THROMBOPLASTIN TIME PARTIAL: CPT | Performed by: INTERNAL MEDICINE

## 2023-01-27 PROCEDURE — 99203 OFFICE O/P NEW LOW 30 MIN: CPT | Performed by: HOSPITALIST

## 2023-01-27 PROCEDURE — C1769 GUIDE WIRE: HCPCS | Performed by: INTERNAL MEDICINE

## 2023-01-27 PROCEDURE — 999N000184 HC STATISTIC TELEMETRY

## 2023-01-27 PROCEDURE — 999N000071 HC STATISTIC HEART CATH LAB OR EP LAB

## 2023-01-27 PROCEDURE — 80048 BASIC METABOLIC PNL TOTAL CA: CPT | Performed by: INTERNAL MEDICINE

## 2023-01-27 PROCEDURE — 250N000009 HC RX 250

## 2023-01-27 PROCEDURE — 93010 ELECTROCARDIOGRAM REPORT: CPT | Mod: 76 | Performed by: INTERNAL MEDICINE

## 2023-01-27 PROCEDURE — 85018 HEMOGLOBIN: CPT | Performed by: INTERNAL MEDICINE

## 2023-01-27 PROCEDURE — 93454 CORONARY ARTERY ANGIO S&I: CPT | Performed by: INTERNAL MEDICINE

## 2023-01-27 PROCEDURE — 80061 LIPID PANEL: CPT | Performed by: INTERNAL MEDICINE

## 2023-01-27 PROCEDURE — 92928 PRQ TCAT PLMT NTRAC ST 1 LES: CPT | Mod: RC | Performed by: INTERNAL MEDICINE

## 2023-01-27 DEVICE — STENT COR ONYX FRONTIER 15X2.50MM ONYXNG25015UX: Type: IMPLANTABLE DEVICE | Status: FUNCTIONAL

## 2023-01-27 DEVICE — CLOSURE ANGIOSEAL 6FR 610130: Type: IMPLANTABLE DEVICE | Status: FUNCTIONAL

## 2023-01-27 RX ORDER — LIDOCAINE 40 MG/G
CREAM TOPICAL
Status: DISCONTINUED | OUTPATIENT
Start: 2023-01-27 | End: 2023-01-28 | Stop reason: HOSPADM

## 2023-01-27 RX ORDER — ACETAMINOPHEN 325 MG/1
650 TABLET ORAL EVERY 6 HOURS PRN
Status: DISCONTINUED | OUTPATIENT
Start: 2023-01-27 | End: 2023-01-28 | Stop reason: HOSPADM

## 2023-01-27 RX ORDER — ASPIRIN 81 MG/1
243 TABLET, CHEWABLE ORAL ONCE
Status: COMPLETED | OUTPATIENT
Start: 2023-01-27 | End: 2023-01-27

## 2023-01-27 RX ORDER — NITROGLYCERIN 5 MG/ML
VIAL (ML) INTRAVENOUS
Status: DISCONTINUED | OUTPATIENT
Start: 2023-01-27 | End: 2023-01-27 | Stop reason: HOSPADM

## 2023-01-27 RX ORDER — NALOXONE HYDROCHLORIDE 0.4 MG/ML
0.4 INJECTION, SOLUTION INTRAMUSCULAR; INTRAVENOUS; SUBCUTANEOUS
Status: ACTIVE | OUTPATIENT
Start: 2023-01-27 | End: 2023-01-27

## 2023-01-27 RX ORDER — NALOXONE HYDROCHLORIDE 0.4 MG/ML
0.2 INJECTION, SOLUTION INTRAMUSCULAR; INTRAVENOUS; SUBCUTANEOUS
Status: ACTIVE | OUTPATIENT
Start: 2023-01-27 | End: 2023-01-27

## 2023-01-27 RX ORDER — IOPAMIDOL 755 MG/ML
135 INJECTION, SOLUTION INTRAVASCULAR ONCE
Status: COMPLETED | OUTPATIENT
Start: 2023-01-27 | End: 2023-01-27

## 2023-01-27 RX ORDER — ASPIRIN 81 MG/1
81 TABLET, CHEWABLE ORAL ONCE
Status: COMPLETED | OUTPATIENT
Start: 2023-01-27 | End: 2023-01-27

## 2023-01-27 RX ORDER — HYDROCODONE BITARTRATE AND ACETAMINOPHEN 5; 325 MG/1; MG/1
1-2 TABLET ORAL EVERY 4 HOURS PRN
Status: DISCONTINUED | OUTPATIENT
Start: 2023-01-27 | End: 2023-01-27 | Stop reason: ALTCHOICE

## 2023-01-27 RX ORDER — ONDANSETRON 2 MG/ML
4 INJECTION INTRAMUSCULAR; INTRAVENOUS EVERY 6 HOURS PRN
Status: DISCONTINUED | OUTPATIENT
Start: 2023-01-27 | End: 2023-01-28 | Stop reason: HOSPADM

## 2023-01-27 RX ORDER — FLUMAZENIL 0.1 MG/ML
0.2 INJECTION, SOLUTION INTRAVENOUS
Status: ACTIVE | OUTPATIENT
Start: 2023-01-27 | End: 2023-01-27

## 2023-01-27 RX ORDER — CARVEDILOL 12.5 MG/1
12.5 TABLET ORAL 2 TIMES DAILY WITH MEALS
Status: DISCONTINUED | OUTPATIENT
Start: 2023-01-27 | End: 2023-01-28 | Stop reason: HOSPADM

## 2023-01-27 RX ORDER — ONDANSETRON 4 MG/1
4 TABLET, ORALLY DISINTEGRATING ORAL EVERY 6 HOURS PRN
Status: DISCONTINUED | OUTPATIENT
Start: 2023-01-27 | End: 2023-01-28 | Stop reason: HOSPADM

## 2023-01-27 RX ORDER — HEPARIN SODIUM 1000 [USP'U]/ML
INJECTION, SOLUTION INTRAVENOUS; SUBCUTANEOUS
Status: DISCONTINUED | OUTPATIENT
Start: 2023-01-27 | End: 2023-01-27 | Stop reason: HOSPADM

## 2023-01-27 RX ORDER — LOSARTAN POTASSIUM 100 MG/1
100 TABLET ORAL DAILY
Status: DISCONTINUED | OUTPATIENT
Start: 2023-01-28 | End: 2023-01-28 | Stop reason: HOSPADM

## 2023-01-27 RX ORDER — ACETAMINOPHEN 650 MG/1
650 SUPPOSITORY RECTAL EVERY 6 HOURS PRN
Status: DISCONTINUED | OUTPATIENT
Start: 2023-01-27 | End: 2023-01-28 | Stop reason: HOSPADM

## 2023-01-27 RX ORDER — LIDOCAINE 40 MG/G
CREAM TOPICAL
Status: DISCONTINUED | OUTPATIENT
Start: 2023-01-27 | End: 2023-01-27 | Stop reason: HOSPADM

## 2023-01-27 RX ORDER — SODIUM CHLORIDE 9 MG/ML
INJECTION, SOLUTION INTRAVENOUS CONTINUOUS
Status: ACTIVE | OUTPATIENT
Start: 2023-01-27 | End: 2023-01-27

## 2023-01-27 RX ORDER — OXYCODONE HYDROCHLORIDE 5 MG/1
10 TABLET ORAL EVERY 4 HOURS PRN
Status: DISCONTINUED | OUTPATIENT
Start: 2023-01-27 | End: 2023-01-28 | Stop reason: HOSPADM

## 2023-01-27 RX ORDER — POTASSIUM CHLORIDE 1500 MG/1
20 TABLET, EXTENDED RELEASE ORAL
Status: DISCONTINUED | OUTPATIENT
Start: 2023-01-27 | End: 2023-01-27 | Stop reason: HOSPADM

## 2023-01-27 RX ORDER — METOPROLOL TARTRATE 1 MG/ML
5 INJECTION, SOLUTION INTRAVENOUS
Status: DISCONTINUED | OUTPATIENT
Start: 2023-01-27 | End: 2023-01-28 | Stop reason: HOSPADM

## 2023-01-27 RX ORDER — NITROGLYCERIN 0.4 MG/1
0.4 TABLET SUBLINGUAL EVERY 5 MIN PRN
Status: DISCONTINUED | OUTPATIENT
Start: 2023-01-27 | End: 2023-01-28 | Stop reason: HOSPADM

## 2023-01-27 RX ORDER — OXYCODONE HYDROCHLORIDE 5 MG/1
5 TABLET ORAL EVERY 4 HOURS PRN
Status: DISCONTINUED | OUTPATIENT
Start: 2023-01-27 | End: 2023-01-28 | Stop reason: HOSPADM

## 2023-01-27 RX ORDER — ATORVASTATIN CALCIUM 80 MG/1
80 TABLET, FILM COATED ORAL AT BEDTIME
Status: DISCONTINUED | OUTPATIENT
Start: 2023-01-27 | End: 2023-01-28 | Stop reason: HOSPADM

## 2023-01-27 RX ORDER — SPIRONOLACTONE 25 MG/1
25 TABLET ORAL DAILY
Status: DISCONTINUED | OUTPATIENT
Start: 2023-01-28 | End: 2023-01-28 | Stop reason: HOSPADM

## 2023-01-27 RX ORDER — ASPIRIN 325 MG
325 TABLET ORAL ONCE
Status: COMPLETED | OUTPATIENT
Start: 2023-01-27 | End: 2023-01-27

## 2023-01-27 RX ORDER — ONDANSETRON 2 MG/ML
4 INJECTION INTRAMUSCULAR; INTRAVENOUS EVERY 6 HOURS PRN
Status: DISCONTINUED | OUTPATIENT
Start: 2023-01-27 | End: 2023-01-27

## 2023-01-27 RX ORDER — SODIUM CHLORIDE 9 MG/ML
INJECTION, SOLUTION INTRAVENOUS CONTINUOUS
Status: DISCONTINUED | OUTPATIENT
Start: 2023-01-27 | End: 2023-01-27 | Stop reason: HOSPADM

## 2023-01-27 RX ORDER — FENTANYL CITRATE 50 UG/ML
25 INJECTION, SOLUTION INTRAMUSCULAR; INTRAVENOUS
Status: DISCONTINUED | OUTPATIENT
Start: 2023-01-27 | End: 2023-01-28 | Stop reason: HOSPADM

## 2023-01-27 RX ORDER — AMLODIPINE BESYLATE 5 MG/1
5 TABLET ORAL DAILY
Status: DISCONTINUED | OUTPATIENT
Start: 2023-01-28 | End: 2023-01-28 | Stop reason: HOSPADM

## 2023-01-27 RX ORDER — IOPAMIDOL 755 MG/ML
INJECTION, SOLUTION INTRAVASCULAR
Status: DISCONTINUED | OUTPATIENT
Start: 2023-01-27 | End: 2023-01-27 | Stop reason: HOSPADM

## 2023-01-27 RX ORDER — FENTANYL CITRATE 50 UG/ML
INJECTION, SOLUTION INTRAMUSCULAR; INTRAVENOUS
Status: DISCONTINUED | OUTPATIENT
Start: 2023-01-27 | End: 2023-01-27 | Stop reason: HOSPADM

## 2023-01-27 RX ORDER — ACETAMINOPHEN 325 MG/1
650 TABLET ORAL EVERY 4 HOURS PRN
Status: DISCONTINUED | OUTPATIENT
Start: 2023-01-27 | End: 2023-01-27

## 2023-01-27 RX ORDER — HYDRALAZINE HYDROCHLORIDE 20 MG/ML
10 INJECTION INTRAMUSCULAR; INTRAVENOUS EVERY 4 HOURS PRN
Status: DISCONTINUED | OUTPATIENT
Start: 2023-01-27 | End: 2023-01-28 | Stop reason: HOSPADM

## 2023-01-27 RX ORDER — ONDANSETRON 4 MG/1
4 TABLET, ORALLY DISINTEGRATING ORAL EVERY 6 HOURS PRN
Status: DISCONTINUED | OUTPATIENT
Start: 2023-01-27 | End: 2023-01-27

## 2023-01-27 RX ORDER — ATROPINE SULFATE 0.1 MG/ML
0.5 INJECTION INTRAVENOUS
Status: ACTIVE | OUTPATIENT
Start: 2023-01-27 | End: 2023-01-27

## 2023-01-27 RX ORDER — ASPIRIN 81 MG/1
81 TABLET ORAL DAILY
Status: DISCONTINUED | OUTPATIENT
Start: 2023-01-28 | End: 2023-01-28 | Stop reason: HOSPADM

## 2023-01-27 RX ADMIN — SODIUM CHLORIDE: 9 INJECTION, SOLUTION INTRAVENOUS at 07:57

## 2023-01-27 RX ADMIN — SODIUM CHLORIDE 85 ML: 900 INJECTION INTRAVENOUS at 21:27

## 2023-01-27 RX ADMIN — IOPAMIDOL 135 ML: 755 INJECTION, SOLUTION INTRAVENOUS at 21:27

## 2023-01-27 RX ADMIN — OXYCODONE HYDROCHLORIDE 5 MG: 5 TABLET ORAL at 12:17

## 2023-01-27 RX ADMIN — ACETAMINOPHEN 650 MG: 325 TABLET, FILM COATED ORAL at 13:18

## 2023-01-27 RX ADMIN — ONDANSETRON 4 MG: 4 TABLET, ORALLY DISINTEGRATING ORAL at 13:36

## 2023-01-27 ASSESSMENT — ACTIVITIES OF DAILY LIVING (ADL)
ADLS_ACUITY_SCORE: 39
ADLS_ACUITY_SCORE: 37

## 2023-01-27 NOTE — Clinical Note
Max pressure = 10 shabbir. Total duration = 27 seconds.     Max pressure = 14 shabbir. Total duration = 49 seconds.

## 2023-01-27 NOTE — Clinical Note
6 Fr Angio-Seal utilized for closure of site.  Mechanical pressure applied applied by scrub person; hemostasis achieved.

## 2023-01-27 NOTE — PROGRESS NOTES
Pt turned on side - log -rolled  Back - massaged and lotion  Rt groin remains ecchymotic and   Hematoma remains  VSS   Denies any CP- SOB

## 2023-01-27 NOTE — H&P
[facility]    History and Physical - Hospitalist Service       Date of Admission:  1/27/2023    Assessment & Plan      Paras Ramos is a 67 year old male admitted on 1/27/2023. Past medical history of coronary artery disease (CAD) w/ stent(s), hyperlipidemia, type 2 diabetes mellitus, acid reflux, sleep apnea, hypertension, and morbid obesity.  Admitted observation following cardiac catheterization 1/27/2023 with subsequent right groin bleeding and concerns of hematoma.    Right groin hematoma, s/p cardiac catheterization 1/27/23  Coronary artery disease (CAD), s/p stent to right coronary artery (RCA) 1/27/23  History of ischemic cardiomyopathy  History of coronary disease with coronary stent on admission that subsequent bleeding right groin site with hematoma formation.  Hospital admission observation under hospitalist service at the request of cardiology.    Admit observation    Cardiology consult follow-up; patient discussed with Dr. Caldwell 1/27 by phone    Recheck hemoglobin; monitor for signs/symptoms of bleeding    Post-catheterization protocol per Cardiology with regard to groin site, local pressure, further intervention, and any cardiac medications (includinig anticoagulation, antiplatelet drugs)    Pain control; oral opiates as needed for severe pain, monitor for side effects; non-opiates as able for mild to moderate pain, Tylenol    Monitor vital signs    Cardiac, diabetic diet    Pharmacy consult for medication review and reconciliation; rounding provider to please review    AM labs    Essential hypertension    Stable, monitor blood pressure and heart rate, recent bradycardia    Telemetry    Continue PTA amlodipine, carvedilol, losartan, spironolactone    AM basic profile    Type II diabetes mellitus   Glucose on admission 125, mildly elevated    Blood sugar monitoring 4X daily and prn    HOLD PTA metformin for now, reassess daily, monitor serum Cr s/p  "catheterization    Novolog insulin sliding scale if significant hyperglycemia, monitor    Diabetic diet    Hyperlipidemia    Stable, continue PTA atorvastatin    Gastroesophageal reflux disease (GERD)    Stable, monitor    Obstructive sleep apnea (RAUL)    Continuous positive airway pressure (CPAP) per home routine    Morbid obesity    Long-term weight loss appropriate, follow-up with primary clinic provider    Consider nutrition consult, inpatient vs outpatient       Diet: Low Saturated Fat Na <2400 mg    DVT Prophylaxis: Defer to Cardiology service  Díaz Catheter: Not present  Lines: None     Cardiac Monitoring: ACTIVE order. Indication: Post- PCI/Angiogram (24 hours)  Code Status:   Full    Clinically Significant Risk Factors Present on Admission                  # Hypertension: home medication list includes antihypertensive(s)      # Severe Obesity: Estimated body mass index is 43.1 kg/m  as calculated from the following:    Height as of this encounter: 1.88 m (6' 2\").    Weight as of this encounter: 152.3 kg (335 lb 11.2 oz).           Disposition Plan      Expected Discharge Date: 01/28/2023                  Jairo Mendez MD  Hospitalist Service  [facility]  Securely message with [employer] (more info)  Text page via en-Gauge Paging/Directory     ______________________________________________________________________    Chief Complaint   Groin hematoma s/p catheterization w/ stent to right coronary artery (RCA), consult/admit request by Dr. Caldwell (Cardiology)    History is obtained from the patient    History of Present Illness   Paras Ramos is a 67 year old male who underwent cardiac catheterization on day of admission with coronary stent to the right coronary artery, see catheterization report for details.  Post procedure developed concerns of bleeding and hematoma formation in the groin.  Cardiology mentioned possible malformed 6F guide sheath.  Ecchymoses, bleeding, and " "hematoma postprocedure.  Recommendations for bedrest per cardiology, overnight observation, and holding further antiplatelet medication with reassessment 1/28/2023.  Patient complains of right hip/groin pain on site of puncture site.  Past history of coronary to disease with obstructive lesions involving LAD, previously revascularized with 2 drug-eluting stents.  Recent intervention right coronary artery.  Past history of cardiomyopathy with left ventricular dysfunction, EF previously 25 to 30%.  Presenting signs and symptoms of exertional dyspnea and chest discomfort over the past 6 months, relieved with rest.  Most recent echocardiogram 1/25/2023 reviewed with improved EF 55 to 60%, see report for details.  At present, comfortable, lying in bed, with some mild to moderate groin pain on the right.  No nausea or vomiting.  No shortness of breath or chest pain.  Pain described as \"an ache\", up to 4 out of 10 in intensity.  Appetite stable.  Care plan discussed with nursing staff.    Past Medical History    Past Medical History:   Diagnosis Date     Arthritis     OA of ankle and hip     Colon polyp      Coronary artery disease involving native coronary artery of native heart without angina pectoris 3/18/2021    coronary angiogram with DESx2 to LAD, residual RPDA disease 3/2021      Dyslipidemia 3/18/2021     ED (erectile dysfunction)      Gastro-oesophageal reflux disease      Hip dislocation, right (H)      Hypertension      Ischemic cardiomyopathy 3/18/2021     OA (osteoarthritis) of ankle      Osteoarthritis of hip      Sleep apnea     no cpap     T2DM (type 2 diabetes mellitus) (H) 3/18/2021     Testicular hypofunction        Past Surgical History   Past Surgical History:   Procedure Laterality Date     ANKLE SURGERY Right      ARTHRODESIS ANKLE  12/9/2013    Procedure: ARTHRODESIS ANKLE;;  Surgeon: Venkata Mccallum MD;  Location: [facility]     ARTHROPLASTY ANKLE  12/9/2013    Procedure: ARTHROPLASTY ANKLE;  RIGHT " TOTAL ANKLE ARTHROPLASTY, SUBTALAR FUSION, LIGAMENT REPAIR ([place])^;  Surgeon: Venkata Mccallum MD;  [facility]     ARTHROPLASTY HIP  7/23/2014    Procedure: ARTHROPLASTY HIP;  Surgeon: Cirilo Nassar MD;  Location:  OR     ARTHROPLASTY REVISION HIP Right 11/14/2014    Procedure: ARTHROPLASTY REVISION HIP;  Surgeon: Cirilo Nassar MD;  Location:  OR     ARTHROPLASTY REVISION HIP Right 11/9/2015    Procedure: ARTHROPLASTY REVISION HIP;  Surgeon: José Estrella MD;  Location:  OR     ARTHROPLASTY REVISION HIP Right 2/1/2017    Procedure: ARTHROPLASTY REVISION HIP;  Surgeon: Cirilo Nassar MD;  Location:  OR     CHOLECYSTECTOMY       CHOLECYSTECTOMY  2000     COLONOSCOPY       CV CORONARY ANGIOGRAM N/A 3/9/2021    Procedure: Coronary Angiogram;  Surgeon: Dilip Zamora MD;  Location:  [facility]     CV INSTANTANEOUS WAVE-FREE RATIO N/A 3/9/2021    Procedure: Instantaneous Wave-Free Ratio;  Surgeon: Dilip Zamora MD;  Location:  [facility]     CV INTRAVASULAR ULTRASOUND N/A 3/9/2021    Procedure: Intravascular Ultrasound;  Surgeon: Dilip Zamora MD;  Location:  [facility]     CV PCI STENT DRUG ELUTING N/A 3/9/2021    Procedure: Percutaneous Coronary Intervention Stent Drug Eluting;  Surgeon: Dilip Zamora MD;  Location:  [facility]     HC CLOSED TX TRAUMATIC HIP DISLOC W/O ANESTH Right 2/15/2015    Procedure: HIP MANIPULATION / CLOSED REDUCTION;  Surgeon: Cliff Boss MD;  Location: [facility];  Service: Orthopedics     JOINT REPLACEMENT       REMOVE HARDWARE ANKLE Right 3/8/2021    Procedure: RIGHT ANKLE STAPLE REMOVAL;  Surgeon: Venkata Mccallum MD;  Location:  OR     REPAIR LIGAMENT ANKLE  12/9/2013    Procedure: REPAIR LIGAMENT ANKLE;;  Surgeon: Venkata Mccallum MD;  [facility]       Prior to Admission Medications   Prior to Admission Medications    Prescriptions Last Dose Informant Patient Reported? Taking?   HYDROcodone-acetaminophen (NORCO) 5-325 MG tablet More than a month  No Yes   Sig: Take 1-2 tablets by mouth every 4 hours as needed for moderate to severe pain   Multiple Vitamin (MULTI-VITAMIN PO) 1/26/2023  Yes Yes   amLODIPine (NORVASC) 5 MG tablet 1/27/2023 Self Yes Yes   Sig: Take 5 mg by mouth daily    aspirin (ASA) 81 MG EC tablet 1/27/2023  No Yes   Sig: Take 1 tablet (81 mg) by mouth daily   atorvastatin (LIPITOR) 80 MG tablet 1/27/2023  No Yes   Sig: Take 1 tablet (80 mg) by mouth At Bedtime   carvedilol (COREG) 12.5 MG tablet 1/27/2023  No Yes   Sig: Take 1 tablet (12.5 mg) by mouth 2 times daily (with meals)   losartan (COZAAR) 100 MG tablet 1/27/2023 Self Yes Yes   Sig: Take 100 mg by mouth daily    metFORMIN (GLUCOPHAGE-XR) 500 MG 24 hr tablet 1/25/2023  Yes No   Sig: Take 500 mg by mouth daily (with dinner)   naproxen sodium (ANAPROX) 220 MG tablet 1/25/2023  Yes No   Sig: Take 220 mg by mouth 2 times daily as needed for moderate pain   nitroGLYcerin (NITROSTAT) 0.4 MG sublingual tablet   No No   Sig: For chest pain place 1 tablet under the tongue every 5 minutes for 3 doses. If symptoms persist 5 minutes after 1st dose call 911.   spironolactone (ALDACTONE) 25 MG tablet 1/26/2023  No Yes   Sig: Take 1 tablet (25 mg) by mouth daily      Facility-Administered Medications: None        Social History   I have reviewed this patient's social history and updated it with pertinent information if needed.  Social History     Tobacco Use     Smoking status: Never     Smokeless tobacco: Never   Substance Use Topics     Alcohol use: Yes     Comment: 1 can beer every 2 weeks     Drug use: No       Family History   I have reviewed this patient's family history and updated it with pertinent information if needed.  Family History   Problem Relation Age of Onset     Heart Failure Mother      Coronary Stenting Mother      Heart Surgery Mother         Allergies   No Known Allergies     Physical Exam   Vital Signs: Temp: 97.9  F (36.6  C) Temp src: Oral BP: (!) 115/91 Pulse: 51   Resp: 18 SpO2: 99 % O2 Device: Nasal cannula Oxygen Delivery: 2 LPM  Weight: 335 lbs 11.2 oz    GENERAL awake and alert, lying in bed  HEAD normocephalic, atraumatic  EYES pupils equal, round; no conjunctival injection or jaundice  ENT mucous membranes moist; no lesions or exudates  LYMPH no cervical or submandibular adenopathy  LUNGS moderate inspiratory effort; breath sounds symmetric; no wheezes, crackles, or rhonchi  HEART S1,S2 with regular rate and rhythm; no rubs or gallops  ABDOMEN soft, non-tender to palpation; no guarding, rebound, or rigidity; no palpable masses or organomegaly  MUSCULOSKELETAL range of motion of joints intact upper and lower extremities; no gross joint deformities; no calf pain or tenderness on palpation; extremities with mild pedal edema  PULSES dorsalis pedis pulses present, symmetric  SKIN warm and dry; ecchymoses involving the right groin with pressure bandage in place, further exam per cardiology, no active bleeding identified  NEURO moves upper and lower extremities spontaneously and to command; no focal weakness appreciated; sensation intact to light touch upper and lower extremities  PSYCHIATRIC awake and alert; answers questions and follows simple commands    Medical Decision Making             Data     I have personally reviewed the following data over the past 24 hrs:    8.3  \   14.0   / 259     138 101 19.3 /  125 (H)   4.4 26 0.96 \       INR:  1.02 PTT:  29   D-dimer:  N/A Fibrinogen:  N/A       Imaging results reviewed over the past 24 hrs:   Recent Results (from the past 24 hour(s))   Cardiac Catheterization    Narrative      Prox LAD lesion is 50% stenosed.    2nd Diag lesion is 30% stenosed.    1st Diag lesion is 30% stenosed.    RPDA-1 lesion is 40% stenosed.    RPDA-2 lesion is 90% stenosed.     Successful AYO to mid PDA.

## 2023-01-27 NOTE — PRE-PROCEDURE
GENERAL PRE-PROCEDURE:   Procedure:  Coronary angiogram, possible intervention  Date/Time:  1/27/2023 9:09 AM    Verbal consent obtained?: Yes    Risks and benefits: Risks, benefits and alternatives were discussed    Consent given by:  Patient  Patient states understanding of procedure being performed: Yes    Patient's understanding of procedure matches consent: Yes    Procedure consent matches procedure scheduled: Yes    Expected level of sedation:  Moderate  Appropriately NPO:  Yes  ASA Class:  4  Mallampati  :  Grade 2- soft palate, base of uvula, tonsillar pillars, and portion of posterior pharyngeal wall visible  Lungs:  Lungs clear with good breath sounds bilaterally  Heart:  Normal heart sounds and rate  History & Physical reviewed:  History and physical reviewed and no updates needed  Statement of review:  I have reviewed the lab findings, diagnostic data, medications, and the plan for sedation

## 2023-01-27 NOTE — PROGRESS NOTES
Dilated fundus exam was performed today. There was no evidence of a retinal tear or break. HPI and Plan:     I had the pleasure of seeing Mr. Ramos in follow up at the [facility] today.  He is a very pleasant, 66-year-old gentleman whom I saw as an inpatient at [facility] in March of this year.     The patient had undergone removal of a loose staple post right ankle repair and postoperatively started experiencing substernal chest discomfort.  At the time I saw him, I ordered an echocardiogram, which demonstrated severe left ventricular dysfunction with an ejection fraction of 25%-30%.  This appeared to be global in nature, but given these findings, I recommended a coronary angiography, which was performed on 03/09/2021.       The patient was found to have 2 vessel obstructive coronary artery disease with a diffuse mid to distal LAD stenosis that was revascularized with 2 drug-eluting stents.  He also was noted to have a focal 70% stenosis in the RPDA, which was thought to be appropriate for medical therapy initially.  He was also initiated on appropriate medical therapy for his cardiomyopathy.     When the patient was seen in followup in late March of this year, he was actually doing better overall from a cardiovascular standpoint.  He did report some chest discomfort at the time, and revascularization of the RPDA was again discussed, but ultimately not performed.       Unfortunately, he was hospitalized at [facility] in early May of this year with COVID-19 pneumonia. Essentially, he developed fevers/ache/malaise with progressive dyspnea.  He tested positive for COVID-19 and was actually treated with remdesivir and dexamethasone.  He also required supplemental oxygen, which was fortunately weaned off prior to discharge.       The patient had noted persistent dyspnea on exertion since then which was still ongoing at the time of his last office visit in May of this year.  I ordered a comprehensive work-up at the time that included a troponin which was negative, and  N-terminal proBNP which was mildly elevated and a CBC which was within normal limits.  A D-dimer was mildly elevated and I referred him for a CT of the chest with contrast which demonstrated no evidence of pulmonary embolism but bilateral multifocal groundglass nodular opacities consistent with COVID-19 pneumonia.  I recommended pulmonary consultation which the patient actually has scheduled for later on this month.    From a cardiovascular standpoint he underwent a stress cardiovascular MRI on 6/2/2021.  This demonstrated moderately reduced left ventricular systolic function with a calculated LVEF of 36%, no ischemia on stress perfusion imaging and subendocardial enhancement consistent with a prior infarct in the circumflex distribution.    Since the time of his last office visit he actually feels his dyspnea has improved.  He does experience bendopnea on occasion but in general his exertional capacity has increased.  He denies any exertional chest pain, palpitations, syncope or presyncope.  He has continued to lose weight at an impressive pace and is down over 50 pounds since he was initially seen as an inpatient.     He remains fully compliant with his medical therapy.     PHYSICAL EXAMINATION:  Dictated below.          IMPRESSION:    1.  Coronary artery disease, status post drug-eluting stent placement to the LAD as described above in 03/2021.  Remains on dual antiplatelet therapy with aspirin 81 mg daily and prasugrel 10 mg daily.  2.  Residual moderate RPDA stenosis, which was not intervened on at the time of his initial angiogram.  A recent stress cardiovascular MRI was negative for ischemia.  3.  Presumed ischemic cardiomyopathy with evidence of improvement in left ventricular systolic function on his most recent cardiovascular MRI.  4.  Recent COVID-19 pneumonia.  5.  Dyspnea on exertion and generalized fatigue since his recent hospitalization in early May of this year.  This appears to be improving.  6.   Diabetes, type 2.    The patient appears to be doing better overall from a cardiovascular standpoint.  Pulmonary consultation is still appropriate given his recent COVID-19 pneumonia and I will follow-up on the results of this evaluation.    Regarding his ischemic cardiomyopathy, I have recommended the addition of dapagliflozin given his type 2 diabetes and moderately reduced LVEF.  I have also increased his carvedilol dosage to 6.25 mg p.o. twice daily.  We will obtain a follow-up cardiovascular MRI without contrast in approximately 6 months to reassess his left ventricular systolic function, at which point cardiology follow-up will also be scheduled.    It was a pleasure seeing him in follow-up today.        Encounter Diagnosis   Name Primary?     Unstable angina (H)        CURRENT MEDICATIONS:  Current Outpatient Medications   Medication Sig Dispense Refill     amLODIPine (NORVASC) 5 MG tablet Take 5 mg by mouth daily        aspirin (ASA) 81 MG EC tablet Take 1 tablet (81 mg) by mouth daily 30 tablet 0     atorvastatin (LIPITOR) 80 MG tablet Take 1 tablet (80 mg) by mouth At Bedtime 30 tablet 3     carvedilol (COREG) 3.125 MG tablet Take 1 tablet (3.125 mg) by mouth 2 times daily (with meals) 60 tablet 3     HYDROcodone-acetaminophen (NORCO) 5-325 MG tablet Take 1-2 tablets by mouth every 4 hours as needed for moderate to severe pain 10 tablet 0     losartan (COZAAR) 100 MG tablet Take 100 mg by mouth daily        metFORMIN (GLUCOPHAGE-XR) 500 MG 24 hr tablet Take 500 mg by mouth daily (with dinner)       naproxen sodium (ANAPROX) 220 MG tablet Take 220 mg by mouth 2 times daily as needed for moderate pain       nitroGLYcerin (NITROSTAT) 0.4 MG sublingual tablet For chest pain place 1 tablet under the tongue every 5 minutes for 3 doses. If symptoms persist 5 minutes after 1st dose call 911. 15 tablet 0     ondansetron (ZOFRAN-ODT) 4 MG ODT tab Take 1-2 tablets (4-8 mg) by mouth every 8 hours as needed for nausea  4 tablet 0     prasugrel (EFFIENT) 10 MG TABS tablet Take 1 tablet (10 mg) by mouth daily 30 tablet 3     spironolactone (ALDACTONE) 25 MG tablet Take 1 tablet (25 mg) by mouth daily 30 tablet 3       ALLERGIES   No Known Allergies    PAST MEDICAL HISTORY:  Past Medical History:   Diagnosis Date     Arthritis     OA of ankle and hip     Colon polyp      Coronary artery disease involving native coronary artery of native heart without angina pectoris 3/18/2021    coronary angiogram with DESx2 to LAD, residual RPDA disease 3/2021      Dyslipidemia 3/18/2021     ED (erectile dysfunction)      Gastro-oesophageal reflux disease      Hip dislocation, right (H)      Hypertension      Ischemic cardiomyopathy 3/18/2021     OA (osteoarthritis) of ankle      Osteoarthritis of hip      Sleep apnea     no cpap     T2DM (type 2 diabetes mellitus) (H) 3/18/2021     Testicular hypofunction        PAST SURGICAL HISTORY:  Past Surgical History:   Procedure Laterality Date     ANKLE SURGERY Right      ARTHRODESIS ANKLE  12/9/2013    Procedure: ARTHRODESIS ANKLE;;  Surgeon: Venkata Mccallum MD;  Location:  [facility]     ARTHROPLASTY ANKLE  12/9/2013    Procedure: ARTHROPLASTY ANKLE;  RIGHT TOTAL ANKLE ARTHROPLASTY, SUBTALAR FUSION, LIGAMENT REPAIR (Tornier VALENCIA)^;  Surgeon: Venkata Mccallum MD;  Location:  [facility]     ARTHROPLASTY HIP  7/23/2014    Procedure: ARTHROPLASTY HIP;  Surgeon: Cirilo Nassar MD;  Location:  OR     ARTHROPLASTY REVISION HIP Right 11/14/2014    Procedure: ARTHROPLASTY REVISION HIP;  Surgeon: Cirilo Nassar MD;  Location:  OR     ARTHROPLASTY REVISION HIP Right 11/9/2015    Procedure: ARTHROPLASTY REVISION HIP;  Surgeon: José Estrella MD;  Location:  OR     ARTHROPLASTY REVISION HIP Right 2/1/2017    Procedure: ARTHROPLASTY REVISION HIP;  Surgeon: Cirilo Nassar MD;  Location:  OR     CHOLECYSTECTOMY       CHOLECYSTECTOMY  2000     COLONOSCOPY       CV CORONARY ANGIOGRAM N/A 3/9/2021     Procedure: Coronary Angiogram;  Surgeon: Dilip Zamora MD;  Location:  HE[facility]ARDI[facility] CATH LAB     CV INSTANTANEOUS WAVE-FREE RATIO N/A 3/9/2021    Procedure: Instantaneous Wave-Free Ratio;  Surgeon: Dilip Zamora MD;  Location:  HE[facility] CARDI[facility] CATH LAB     CV INTRAVASULAR ULTRASOUND N/A 3/9/2021    Procedure: Intravascular Ultrasound;  Surgeon: Dilip Zamora MD;  Location:  HEART CARDI[facility] CATH LAB     CV PCI STENT DRUG ELUTING N/A 3/9/2021    Procedure: Percutaneous Coronary Intervention Stent Drug Eluting;  Surgeon: Dilip Zamora MD;  Location:  HEART CARDIAC CATH LAB     HC CLOSED TX TRAUMATIC HIP DISLOC W/O ANESTH Right 2/15/2015    Procedure: HIP MANIPULATION / CLOSED REDUCTION;  Surgeon: Cliff Boss MD;  Location: [facility] OR;  Service: Orthopedics     JOINT REPLACEMENT       REMOVE HARDWARE ANKLE Right 3/8/2021    Procedure: RIGHT ANKLE STAPLE REMOVAL;  Surgeon: Venkata Mccallum MD;  Location:  OR     REPAIR LIGAMENT ANKLE  12/9/2013    Procedure: REPAIR LIGAMENT ANKLE;;  Surgeon: Venkata Mccallum MD;  Location: [facility]       FAMILY HISTORY:  Family History   Problem Relation Age of Onset     Heart Failure Mother      Coronary Stenting Mother      Heart Surgery Mother        SOCIAL HISTORY:  Social History     Socioeconomic History     Marital status:      Spouse name: None     Number of children: None     Years of education: None     Highest education level: None   Occupational History     None   Social Needs     Financial resource strain: None     Food insecurity     Worry: None     Inability: None     Transportation needs     Medical: None     Non-medical: None   Tobacco Use     Smoking status: Never Smoker     Smokeless tobacco: Never Used   Substance and Sexual Activity     Alcohol use: Yes     Comment: 1 can beer a week     Drug use: No     Sexual activity: None   Lifestyle     Physical activity     Days per week: None      Minutes per session: None     Stress: None   Relationships     Social connections     Talks on phone: None     Gets together: None     Attends [employer] service: None     Active member of club or organization: None     Attends meetings of clubs or organizations: None     Relationship status: None     Intimate partner violence     Fear of current or ex partner: None     Emotionally abused: None     Physically abused: None     Forced sexual activity: None   Other Topics Concern     None   Social History Narrative     None       Review of Systems:  Skin:  Negative       Eyes:  Positive for glasses    ENT:  Negative      Respiratory:  Positive for dyspnea on exertion;sleep apnea;CPAP     Cardiovascular:    Positive for;chest pain;palpitations;edema;lightheadedness    Gastroenterology: Negative      Genitourinary:  Negative      Musculoskeletal:  Positive for arthritis    Neurologic:  Positive for numbness or tingling of hands(left hand)    Psychiatric:  Negative      Heme/Lymph/Imm:  Negative      Endocrine:  Positive for diabetes      Physical Exam:  Vitals: /83   Pulse 63   Wt (!) 153.3 kg (338 lb)   SpO2 98%   BMI 42.25 kg/m      Constitutional:  cooperative, alert and oriented, well developed, well nourished, in no acute distress        Skin:  warm and dry to the touch, no apparent skin lesions or masses noted          Head:  normocephalic, no masses or lesions        Eyes:  pupils equal and round        Lymph:      ENT:  no pallor or cyanosis, dentition good        Neck:  JVP normal        Respiratory:  clear to auscultation         Cardiac: regular rhythm                pulses full and equal                                        GI:  abdomen soft        Extremities and Muscular Skeletal:  no edema              Neurological:  no gross motor deficits        Psych:  Alert and Oriented x 3        CC  Shagufta Wu MD  [address]  [place] [id number]

## 2023-01-27 NOTE — Clinical Note
Potential access sites were evaluated for patency using ultrasound.   The right femoral artery was selected. Access was obtained under with Sonosite guidance using a standard 18 gauge needle with direct visualization of needle entry.

## 2023-01-27 NOTE — DISCHARGE INSTRUCTIONS
Cardiac Stent Discharge Instructions - Femoral    After you go home:    Have an adult stay with you until tomorrow.  Drink extra fluids for 2 days.  You may resume your normal diet.  No smoking       For 24 hours - due to the sedation you received:  Relax and take it easy.  Do NOT make any important or legal decisions.  Do NOT drive or operate machines at home or at work.  Do NOT drink alcohol.    Care of Groin Puncture Site:    For the first 24 hrs - check the puncture site every 1-2 hours while awake.  For 2 days, when you cough, sneeze, laugh or move your bowels, hold your hand over the puncture site and press firmly.  Remove the bandaid after 24 hours. If there is minor oozing, apply another bandaid and remove it after 12 hours.  It is normal to have a small bruise or pea size lump at the site.  You may shower tomorrow. Do NOT take a bath, or use a hot tub or pool for at least 3 days. Do NOT scrub the site. Do not use lotion or powder near the puncture site.     Activity:            For 2 days:  No stooping or squatting  Do NOT do any heavy activity such as exercise, lifting, or straining.   No housework, yard work or any activity that make you sweat  Do NOT lift more than 10 pounds    Bleeding:    If you start bleeding from the site in your groin, lie down flat and press firmly on/above the site for 10 minutes.   Once bleeding stops, lay flat for 2 hours.   Call [facility] as soon as you can.       Call 911 right away if you have heavy bleeding or bleeding that does not stop.      Medicines:    If you are taking an antiplatelet medication such as Plavix, Brilinta or Effient, do not stop taking it until you talk to your cardiologist.      If you are on Metformin (Glucophage), do not restart it until you have blood tests (within 2 to 3 days after discharge).  After you have your blood drawn, you may restart the Metformin.   Take your medications, including blood thinners, unless your provider tells you not to.     If you take Coumadin (Warfarin), have your INR checked by your provider in  3-5 days. Call your clinic to schedule this.  If you have stopped any medicines, check with your provider about when to restart them.    Follow Up Appointments:    Follow up with [facility] Nurse Practitioner at [facility] of patient preference in 7-10 days.  Cardiac Rehab will contact you for follow up care.    Call the clinic if:    You have increased pain or a large or growing hard lump around the site.  The site is red, swollen, hot or tender.  Blood or fluid is draining from the site.  You have chills or a fever greater than 101 F (38 C).  Your leg feels numb, cool or changes color.  You have hives, a rash or unusual itching.  New pain in the back or belly that you cannot control with Tylenol.  Any questions or concerns.      Other Instructions:    If you received a stent - carry your stent card with you at all times.      [facility] [place]:    [phone] [facility] (7 days a week)

## 2023-01-27 NOTE — PROGRESS NOTES
"Page to Dr. Caldwell 12:45 PM: \"R) groin site with large hematoma now, able to reduce with holding pressure 15 min. Pt reports pain at site and radiating to right hip on side of puncture. PRN oxy given.\"    1300: Dr. Caldwell at bedside assessing groin site. Agrees that site is stable and now soft after manual pressure was held. Dr. Caldwell would like pt on bedrest until 1600. Pt agreeable.    Brit Downey RN on 1/27/2023 at 12:49 PM    "

## 2023-01-27 NOTE — PROGRESS NOTES
Care Suites Post Procedure Note    Patient Information  Name: Paras Ramos  Age: 67 year old    Post Procedure  Time patient returned to Care Suites: 1045  Concerns/abnormal assessment: none at this time  If abnormal assessment, provider notified: N/A  Plan/Other: Proceed as planned per orders    Brit Downey RN

## 2023-01-27 NOTE — PROGRESS NOTES
Recurrent bleed with significant hematoma forming.  Malformed 6F guide sheath.  Will extend bed rest and admit to hospitals for observation over night.

## 2023-01-27 NOTE — Clinical Note
Stent deployed in the posterior descending artery. Max pressure = 12 shabbir. Total duration = 32 seconds.

## 2023-01-27 NOTE — PROGRESS NOTES
Care Suites Admission Nursing Note    Patient Information  Name: Paras Ramos  Age: 67 year old  Reason for admission: coronary angiogram  Care Suites arrival time: 0725    Patient Admission/Assessment   Pre-procedure assessment complete: Yes  If abnormal assessment/labs, provider notified: N/A  NPO: Yes  Medications held per instructions/orders: Yes  Consent: deferred to MD  Patient oriented to room: Yes  Education/questions answered: Yes  Plan/other: Proceed as planned    Discharge Planning  Discharge name/phone number: Jacquelyn [phone]  Overnight post sedation caregiver: Jacquelyn  Discharge location: home    Brit Downey RN

## 2023-01-27 NOTE — PROGRESS NOTES
"Page to Dr. Caldwell: \"Pt formed hematoma again. Pressure held 15 minutes. Softer now, site very ecchymotic. Would you like bedrest extended? Hold off on brilinta until tomorrow if discharges?\"    Call from Dr. Caldwell: Pt will spend the night on the observation unit. Bedrest until 2000. Resume brilinta in AM, hold dose this evening.    Brit Downey RN on 1/27/2023 at 3:21 PM    "

## 2023-01-28 VITALS
SYSTOLIC BLOOD PRESSURE: 99 MMHG | RESPIRATION RATE: 16 BRPM | WEIGHT: 315 LBS | HEIGHT: 74 IN | BODY MASS INDEX: 40.43 KG/M2 | DIASTOLIC BLOOD PRESSURE: 69 MMHG | HEART RATE: 89 BPM | TEMPERATURE: 97.4 F | OXYGEN SATURATION: 98 %

## 2023-01-28 LAB
ANION GAP SERPL CALCULATED.3IONS-SCNC: 11 MMOL/L (ref 7–15)
BUN SERPL-MCNC: 17.1 MG/DL (ref 8–23)
CALCIUM SERPL-MCNC: 9.2 MG/DL (ref 8.8–10.2)
CHLORIDE SERPL-SCNC: 102 MMOL/L (ref 98–107)
CREAT SERPL-MCNC: 0.94 MG/DL (ref 0.67–1.17)
DEPRECATED HCO3 PLAS-SCNC: 25 MMOL/L (ref 22–29)
GFR SERPL CREATININE-BSD FRML MDRD: 88 ML/MIN/1.73M2
GLUCOSE SERPL-MCNC: 110 MG/DL (ref 70–99)
HGB BLD-MCNC: 13.9 G/DL (ref 13.3–17.7)
POTASSIUM SERPL-SCNC: 4.7 MMOL/L (ref 3.4–5.3)
SODIUM SERPL-SCNC: 138 MMOL/L (ref 136–145)

## 2023-01-28 PROCEDURE — 36415 COLL VENOUS BLD VENIPUNCTURE: CPT | Performed by: HOSPITALIST

## 2023-01-28 PROCEDURE — 99213 OFFICE O/P EST LOW 20 MIN: CPT | Performed by: STUDENT IN AN ORGANIZED HEALTH CARE EDUCATION/TRAINING PROGRAM

## 2023-01-28 PROCEDURE — 93005 ELECTROCARDIOGRAM TRACING: CPT

## 2023-01-28 PROCEDURE — 250N000013 HC RX MED GY IP 250 OP 250 PS 637: Performed by: INTERNAL MEDICINE

## 2023-01-28 PROCEDURE — 99214 OFFICE O/P EST MOD 30 MIN: CPT | Performed by: INTERNAL MEDICINE

## 2023-01-28 PROCEDURE — 85018 HEMOGLOBIN: CPT | Performed by: HOSPITALIST

## 2023-01-28 PROCEDURE — 250N000013 HC RX MED GY IP 250 OP 250 PS 637: Performed by: HOSPITALIST

## 2023-01-28 PROCEDURE — 999N000054 HC STATISTIC EKG NON-CHARGEABLE

## 2023-01-28 PROCEDURE — 82310 ASSAY OF CALCIUM: CPT | Performed by: INTERNAL MEDICINE

## 2023-01-28 PROCEDURE — 99222 1ST HOSP IP/OBS MODERATE 55: CPT | Mod: FS | Performed by: SURGERY

## 2023-01-28 PROCEDURE — 93010 ELECTROCARDIOGRAM REPORT: CPT | Performed by: INTERNAL MEDICINE

## 2023-01-28 RX ADMIN — ASPIRIN 81 MG: 81 TABLET, COATED ORAL at 08:19

## 2023-01-28 RX ADMIN — LOSARTAN POTASSIUM 100 MG: 100 TABLET, FILM COATED ORAL at 08:19

## 2023-01-28 RX ADMIN — TICAGRELOR 90 MG: 90 TABLET ORAL at 09:57

## 2023-01-28 RX ADMIN — SPIRONOLACTONE 25 MG: 25 TABLET, FILM COATED ORAL at 08:19

## 2023-01-28 RX ADMIN — AMLODIPINE BESYLATE 5 MG: 5 TABLET ORAL at 08:19

## 2023-01-28 RX ADMIN — CARVEDILOL 12.5 MG: 12.5 TABLET, FILM COATED ORAL at 08:19

## 2023-01-28 ASSESSMENT — ACTIVITIES OF DAILY LIVING (ADL)
ADLS_ACUITY_SCORE: 39
ADLS_ACUITY_SCORE: 36
ADLS_ACUITY_SCORE: 35
ADLS_ACUITY_SCORE: 36
ADLS_ACUITY_SCORE: 39

## 2023-01-28 ASSESSMENT — COLUMBIA-SUICIDE SEVERITY RATING SCALE - C-SSRS
5. HAVE YOU STARTED TO WORK OUT OR WORKED OUT THE DETAILS OF HOW TO KILL YOURSELF? DO YOU INTEND TO CARRY OUT THIS PLAN?: NO
2. HAVE YOU ACTUALLY HAD ANY THOUGHTS OF KILLING YOURSELF IN THE PAST MONTH?: NO
6. HAVE YOU EVER DONE ANYTHING, STARTED TO DO ANYTHING, OR PREPARED TO DO ANYTHING TO END YOUR LIFE?: NO
4. HAVE YOU HAD THESE THOUGHTS AND HAD SOME INTENTION OF ACTING ON THEM?: NO
1. IN THE PAST MONTH, HAVE YOU WISHED YOU WERE DEAD OR WISHED YOU COULD GO TO SLEEP AND NOT WAKE UP?: NO
3. HAVE YOU BEEN THINKING ABOUT HOW YOU MIGHT KILL YOURSELF?: NO

## 2023-01-28 NOTE — PLAN OF CARE
Dr. Hinojosa at pt's bedside after CT scan completed. Instructed RN to leave Femstop on with belt pressure only for 2hrs ensuring that pt's CMS and pulses are okay. Hgb q4hrs. Notify MD if condition worsens.

## 2023-01-28 NOTE — PROGRESS NOTES
Reviewed CT abdomen and pelvis and discussed with Vascular Surgery on call. There is no active extravasation of contrast or pseudoaneurysm which is reassuring. Fem stop has been on and off since the patient transitioned to [facility] with large area of bruising and some stiffness on the lateral R groin into the hip but soft elsewhere. Pain is currently 4/10.     Plan  1. Hold ticagrelor tonight and possibly tomorrow am as well.   2. Fem stop to be applied x 2 hours going forward, remove earlier if symptoms of R extremity hypoperfusion  3. Will see if platelet transfusion is needed overnight, but in the absence of active extravasation will hold right now.   4. Hgb checks q 4 hours. Stable so far and he has been hemodynamically stable.   5. RN will call with any updates or concerns.     Domingo Hinojosa M.D.

## 2023-01-28 NOTE — PROVIDER NOTIFICATION
MD Notification    Notified Person: MD Cardiologist     Notified Person Name: Dr. Hinojosa    Notification Date/Time: 1/1954    Notification Interaction: Telephone     Purpose of Notification: Right groin hematoma, Femstop in place     Orders Received: Possible US with thrombin injection, awaiting further orders

## 2023-01-28 NOTE — CONSULTS
Vascular Surgery Consultation    Paras Ramos MRN# [id number]   Age: 67 year old YOB: 1955     Date of Admission:  1/27/2023    Date of Consult:   01/28/23            Assessment and Plan:   67-year-old male postop day 1 status post coronary artery stenting via the right common femoral artery.  Complicated by a large right groin hematoma.  There is no pseudoaneurysm or active extravasation.  -Okay to begin dual antiplatelet therapy with Brilinta and aspirin  -Okay to ambulate  -Home statin  -Okay to discharge when cleared by cardiology.  The patient does not need to follow-up with vascular surgery.    Vascular surgery will sign off, please call with questions.    Seen and discussed with staff, Dr. Munoz.    Rodo Vital MD          History of Present Illness:   Mr. Ramos is a 59-year-old male with a history of coronary artery disease who underwent coronary stenting yesterday to the right femoral artery.  After the procedure he developed a hematoma.  There was concern for his hematoma enlarging despite having a FemoStop on for most of the day yesterday.  CTA overnight demonstrated large right groin hematoma with no evidence of pseudoaneurysm and a widely patent aortoiliac system as well as common femoral and proximal profundofemoral and superficial femoral arteries.  This morning his hematoma remained stable and within the marked borders.  He is resting comfortably in bed.            Past Medical History:     Past Medical History:   Diagnosis Date     Arthritis     OA of ankle and hip     Colon polyp      Coronary artery disease involving native coronary artery of native heart without angina pectoris 3/18/2021    coronary angiogram with DESx2 to LAD, residual RPDA disease 3/2021      Dyslipidemia 3/18/2021     ED (erectile dysfunction)      Gastro-oesophageal reflux disease      Hip dislocation, right (H)      Hypertension      Ischemic cardiomyopathy 3/18/2021     OA (osteoarthritis) of ankle       Osteoarthritis of hip      Sleep apnea     no cpap     T2DM (type 2 diabetes mellitus) (H) 3/18/2021     Testicular hypofunction              Past Surgical History:     Past Surgical History:   Procedure Laterality Date     ANKLE SURGERY Right      ARTHRODESIS ANKLE  12/9/2013    Procedure: ARTHRODESIS ANKLE;;  Surgeon: Venkata Mccallum MD;  [facility]     ARTHROPLASTY ANKLE  12/9/2013    Procedure: ARTHROPLASTY ANKLE;  RIGHT TOTAL ANKLE ARTHROPLASTY, SUBTALAR FUSION, LIGAMENT REPAIR ([employer] VALENCIA)^;  Surgeon: Venkata Mccallum MD;  [facility]     ARTHROPLASTY HIP  7/23/2014    Procedure: ARTHROPLASTY HIP;  Surgeon: Cirilo Nassar MD;  Location:  [facility]     ARTHROPLASTY REVISION HIP Right 11/14/2014    Procedure: ARTHROPLASTY REVISION HIP;  Surgeon: Cirilo Nassar MD;  Location:  OR     ARTHROPLASTY REVISION HIP Right 11/9/2015    Procedure: ARTHROPLASTY REVISION HIP;  Surgeon: José Estrella MD;  Location:  OR     ARTHROPLASTY REVISION HIP Right 2/1/2017    Procedure: ARTHROPLASTY REVISION HIP;  Surgeon: Cirilo Nassar MD;  Location:  OR     CHOLECYSTECTOMY       CHOLECYSTECTOMY  2000     COLONOSCOPY       CV CORONARY ANGIOGRAM N/A 3/9/2021    Procedure: Coronary Angiogram;  Surgeon: Dilip Zamora MD;  Location:  [facility]     CV INSTANTANEOUS WAVE-FREE RATIO N/A 3/9/2021    Procedure: Instantaneous Wave-Free Ratio;  Surgeon: Dilip Zamora MD;  Location:  [facility]     CV INTRAVASULAR ULTRASOUND N/A 3/9/2021    Procedure: Intravascular Ultrasound;  Surgeon: Dilip aZmora MD;  Location:  [facility]     CV PCI STENT DRUG ELUTING N/A 3/9/2021    Procedure: Percutaneous Coronary Intervention Stent Drug Eluting;  Surgeon: Dilip Zamora MD;  Location:  [facility]     HC CLOSED TX TRAUMATIC HIP DISLOC W/O ANESTH Right 2/15/2015    Procedure: HIP MANIPULATION / CLOSED REDUCTION;   Surgeon: Cliff Boss MD;  Location: [facility] OR;  Service: Orthopedics     JOINT REPLACEMENT       REMOVE HARDWARE ANKLE Right 3/8/2021    Procedure: RIGHT ANKLE STAPLE REMOVAL;  Surgeon: Venkata Mccallum MD;  Location:  OR     REPAIR LIGAMENT ANKLE  12/9/2013    Procedure: REPAIR LIGAMENT ANKLE;;  Surgeon: Venkata Mccallum MD;  Location: [facility]             Social History:     Social History     Tobacco Use     Smoking status: Never     Smokeless tobacco: Never   Substance Use Topics     Alcohol use: Yes     Comment: 1 can beer every 2 weeks             Family History:     Family History   Problem Relation Age of Onset     Heart Failure Mother      Coronary Stenting Mother      Heart Surgery Mother                 Allergies:   No Known Allergies          Medications:     Current Facility-Administered Medications   Medication     acetaminophen (TYLENOL) tablet 650 mg    Or     acetaminophen (TYLENOL) Suppository 650 mg     amLODIPine (NORVASC) tablet 5 mg     aspirin EC tablet 81 mg     atorvastatin (LIPITOR) tablet 80 mg     carvedilol (COREG) tablet 12.5 mg     Continuing beta blocker from home medication list OR beta blocker order already placed during this visit     Continuing statin from home medication list OR statin order already placed during this visit     fentaNYL (PF) (SUBLIMAZE) injection 25 mcg     Hold: metformin and metformin containing medications on day of the procedure and for 48 hours after IV contrast given- Patients with acute kidney injury or severe chronic kidney disease (stage IV or stage V; i.e., eGFR less than 30)     hydrALAZINE (APRESOLINE) injection 10 mg     lidocaine (LMX4) cream     lidocaine 1 % 0.1-1 mL     losartan (COZAAR) tablet 100 mg     melatonin tablet 1 mg     metoprolol (LOPRESSOR) injection 5 mg     midazolam (VERSED) injection 0.5 mg     nitroGLYcerin (NITROSTAT) sublingual tablet 0.4 mg     ondansetron (ZOFRAN ODT) ODT tab 4 mg    Or      "ondansetron (ZOFRAN) injection 4 mg     oxyCODONE (ROXICODONE) tablet 5 mg    Or     oxyCODONE (ROXICODONE) tablet 10 mg     Percutaneous Coronary Intervention orders placed (this is information for BPA alerting)     sodium chloride (PF) 0.9% PF flush 3 mL     sodium chloride (PF) 0.9% PF flush 3 mL     spironolactone (ALDACTONE) tablet 25 mg     ticagrelor (BRILINTA) tablet 90 mg               Review of Systems:   A 12 point review of systems was completed and found to be negative unless otherwise stated in the above HPI         Physical Exam:   /82 (BP Location: Right arm)   Pulse 85   Temp 97.4  F (36.3  C) (Oral)   Resp 16   Ht 1.88 m (6' 2\")   Wt (!) 150 kg (330 lb 12.8 oz)   SpO2 95%   BMI 42.47 kg/m        Intake/Output Summary (Last 24 hours) at 1/28/2023 0951  Last data filed at 1/28/2023 0800  Gross per 24 hour   Intake 360 ml   Output 525 ml   Net -165 ml       GEN: A&Ox3, no acute distress  NEURO: CN II-XII grossly intact. No gross neurologic deficits  NECK: trachea midline, no JVD  HEENT: No scleral icterus.  RESP: Nonlabored breathing on room air, no cough  CV: RRR by radial pulse, noncyanotic   ABD: soft, non-tender, nondistended. No guarding or rebound tenderness  PSYCH: cooperative   PULSES: Biphasic right DP and PT Doppler signals.  Palpable left DP Doppler signal.  Multiphasic left PT signal.  EXTREMITIES: Large right groin hematoma within the marked borders.  No evidence of skin compromise.          Data:   All laboratory data reviewed    Results:  BMP  Recent Labs   Lab 01/28/23  0604 01/27/23  0800    138   POTASSIUM 4.7 4.4   CHLORIDE 102 101   CO2 25 26   BUN 17.1 19.3   CR 0.94 0.96   * 125*     CBC  Recent Labs   Lab 01/28/23  0604 01/27/23  2058 01/27/23  1722 01/27/23  0800   WBC  --   --   --  8.3   HGB 13.9 14.1 14.0 14.3   PLT  --   --   --  259     LFT  Recent Labs   Lab 01/27/23  0800   INR 1.02     Recent Labs   Lab 01/28/23  0604 01/27/23  0800   * " 125*       Imaging:  Cardiac Catheterization    Result Date: 2023    Prox LAD lesion is 50% stenosed.   2nd Diag lesion is 30% stenosed.   1st Diag lesion is 30% stenosed.   RPDA-1 lesion is 40% stenosed.   RPDA-2 lesion is 90% stenosed.  Successful AYO to mid PDA.     Echocardiogram Complete    Result Date: 2023  [id number]^ALBERTO^PHI^NANDA  [facility] [address] [place] [address] Phone [phone] Fax [phone]  Name: LARA FREY MRN: [id number] : 1955 Study Date: 2023 08:50 AM Age: 67 yrs Gender: Male Patient Location: [facility] Reason For Study: Coronary artery disease involving native coronary artery of ning Ordering Physician: PHI DOMINGUEZ Referring Physician: PHI DOMINGUEZ Performed By: Tere Luz  BSA: 2.7 m2 Height: 74 in Weight: 342 lb HR: 65 BP: 100/60 mmHg ______________________________________________________________________________ Procedure Complete Echo Adult. Optison (NDC #8145-8158) given intravenously. Poor acoustic windows. Technically difficult study. Technically difficult study.Extremely difficult acoustic windows despite the use of contrast for endcardial border definition.  ______________________________________________________________________________ Interpretation Summary  Technically difficult study due to body habitus. Extremely difficult acoustic windows despite the use of contrast for endocardial border definition. The left ventricle is not well visualized. Left ventricular systolic function is normal. The visual ejection fraction is 55-60%. Regional wall motion abnormalities cannot be excluded due to limited visualization. Right ventricular function cannot be assessed due to poor image quality. Atria and cardiac valves not well visualized. No significant valve disease based on Doppler data. Inferior vena cava not  well visualized.  On previous study dated 3/8/2021, LVEF was reported as 25-30%.  ______________________________________________________________________________ Left Ventricle The left ventricle is not well visualized. There is normal left ventricular wall thickness. Left ventricular systolic function is normal. The visual ejection fraction is 55-60%. Left ventricular diastolic function is not assessable. Regional wall motion abnormalities cannot be excluded due to limited visualization.  Right Ventricle Right ventricular function cannot be assessed due to poor image quality.  Atria The left atrium is not well visualized. Right atrium not well visualized.  Mitral Valve The mitral valve leaflets appear normal. There is no evidence of stenosis, fluttering, or prolapse. There is trace mitral regurgitation. There is no mitral valve stenosis.  Tricuspid Valve The tricuspid valve is not well visualized. There is trace tricuspid regurgitation. Right ventricular systolic pressure could not be approximated due to inadequate tricuspid regurgitation.  Aortic Valve There is mild trileaflet aortic sclerosis. The aortic valve is not well visualized. No aortic regurgitation is present. No aortic stenosis is present.  Pulmonic Valve The pulmonic valve is not well visualized. There is trace pulmonic valvular regurgitation.  Vessels The aortic root is normal size. Normal size ascending aorta. The inferior vena cava is normal.  Pericardium There is no pericardial effusion.  Rhythm Sinus rhythm was noted.  ______________________________________________________________________________ MMode/2D Measurements & Calculations IVSd: 0.97 cm LVIDd: 6.0 cm LVIDs: 4.3 cm LVPWd: 0.97 cm FS: 29.2 % LV mass(C)d: 238.3 grams LV mass(C)dI: 87.3 grams/m2 LA dimension: 3.6 cm  asc Aorta Diam: 3.6 cm RWT: 0.32  Doppler Measurements & Calculations MV E max yue: 65.5 cm/sec MV A max yue: 75.2 cm/sec MV E/A: 0.87 MV dec slope: 254.8 cm/sec2 MV dec time:  0.26 sec PA acc time: 0.10 sec E/E' av.7 Lateral E/e': 7.4 Medial E/e': 7.9  ______________________________________________________________________________ Report approved by: Dr Donovan Marsh 2023 02:31 PM       CTA Abdomen Pelvis with Contrast    Result Date: 2023  EXAM: CTA ABDOMEN PELVIS WITH CONTRAST LOCATION: [facility] DATE/TIME: 2023 9:51 PM INDICATION: Right groin hematoma COMPARISON: None. TECHNIQUE: CT angiogram abdomen pelvis during arterial phase of injection of IV contrast. 2D and 3D MIP reconstructions were performed by the CT technologist. Dose reduction techniques were used. CONTRAST: 135 mL Isovue 370 FINDINGS: ANGIOGRAM ABDOMEN/PELVIS: Extensive ill-defined fluid infiltrating the subcutaneous fat of the right inguinal region and right lower quadrant ventral abdominal wall. No extravasation of contrast. No pseudoaneurysm. Abdominal Aorta: Patent and normal in caliber. Moderate aortic calcifications. No evidence of dissection or penetrating ulcer. Celiac Artery: Patent. Superior Mesenteric Artery: Patent. Replaced right hepatic artery arising from the superior mesenteric artery. Right Renal Artery: Patent. Left Renal Artery: Patent. Inferior Mesenteric Artery: Patent. Right Common Iliac Artery: Patent. Right External Iliac Artery: Patent. Right Internal Iliac Artery: Patent. Right Common Femoral Artery: Patent. Proximal Right Superficial Femoral Artery: Patent. Proximal Right Deep Femoral Artery: Patent. Left Common Iliac Artery: Patent. Left External Iliac Artery: Patent. Left Internal Iliac Artery: Patent. Left Common Femoral Artery: Patent. Proximal Left Superficial Femoral Artery: Patent. Proximal Left Deep Femoral Artery: Patent. LOWER CHEST: Unremarkable. HEPATOBILIARY: Liver appears normal. Status post cholecystectomy. No biliary dilatation. PANCREAS: Normal. SPLEEN: Normal. ADRENAL GLANDS: Normal. KIDNEYS/BLADDER: Multiple low-attenuation  lesions in both kidneys, with the larger ones compatible with simple renal cysts for which no follow-up is indicated and the smaller ones too small to characterize. Excreted contrast in the urinary bladder. No filling defects. No bladder wall thickening. BOWEL: Extensive colonic diverticulosis. No obstruction or inflammatory change. Normal appendix. No evidence of acute appendicitis. LYMPH NODES: No lymphadenopathy. PELVIC ORGANS: Mildly enlarged prostate. MUSCULOSKELETAL: Extensive ill-defined fluid/fat stranding infiltrating the subcutaneous fat of the right inguinal region and right lower quadrant ventral abdominal wall. No discrete fluid collection or hematoma. Multilevel degenerative changes of the spine. Right total hip arthroplasty, incompletely imaged.     IMPRESSION: 1.  Subcutaneous ill-defined fluid/stranding of the right inguinal region and right lower quadrant ventral abdominal wall. No discrete fluid collection or hematoma. No active arterial extravasation. No evidence of pseudoaneurysm.

## 2023-01-28 NOTE — PLAN OF CARE
Goal Outcome Evaluation:  Patient a/o x4. After bedrest, up with SBA. Femstop removed at 0030. Right groin site remains soft, bruising perimeter possibly slighter larger. Denies pain in area except with palpation. CMS pulse wnl. SB,BP stable. O2 2L sats in mid 90's. Continue to monitor    Plan of Care Reviewed With: patient

## 2023-01-28 NOTE — PROVIDER NOTIFICATION
MD Notification    Notified Person: MD Hospitalist     Notified Person Name: Dr. Mendez     Notification Date/Time: 1/27/23 1850    Notification Interaction: AmCom page    Purpose of Notification: Pt arrived to [facility] from  after having and intervention today. Right groin firm hematoma small cantaloupe size, very tender to touch. Femstop applied, CMS and pulse good.     Orders Received: Awaiting further orders

## 2023-01-28 NOTE — PROGRESS NOTES
Pt transferred to [facility]  via cart   Site checked ( Rt groin ) remains ecchymotic with hematoma   VSS   Denies any CP -SOB  Hgb pending

## 2023-01-28 NOTE — PROGRESS NOTES
Pt discharged home via wheel chair with wife. All belongings with patient. Reagan filled here in care suites. 2/10 R groin pain at baseline. All paper work reviewed with pt. Bleeding instructions reviewed. AMBULATORY IN room. PIV removed per plan of care. Plan for BMP 1/31 for metformin. CR and 7-10 day cardiac NP.

## 2023-01-29 NOTE — DISCHARGE SUMMARY
[facility]  Hospitalist Discharge Summary      Date of Admission:  1/27/2023  Date of Discharge:  1/28/2023  2:21 PM  Discharging Provider: Joellen Leo DO  Discharge Service: Hospitalist Service    Discharge Diagnoses   See below    Follow-ups Needed After Discharge   Follow-up Appointments     Follow-up and recommended labs and tests       Follow up with cardiology as previous scheduled             Discharge Disposition   Discharged to home  Condition at discharge: Stable      Hospital Course   Paras Ramos is a 67 year old male admitted on 1/27/2023. Past medical history of coronary artery disease (CAD) w/ stent(s), hyperlipidemia, type 2 diabetes mellitus, acid reflux, sleep apnea, hypertension, and morbid obesity.  Admitted observation following cardiac catheterization 1/27/2023 with subsequent right groin bleeding and concerns of hematoma.     Right groin hematoma, s/p cardiac catheterization 1/27/23  Coronary artery disease (CAD), s/p stent to right coronary artery (RCA) 1/27/23  History of ischemic cardiomyopathy  History of coronary disease with coronary stent on admission that subsequent bleeding right groin site with hematoma formation.  Hospital admission observation under hospitalist service at the request of cardiology.    -Hb remained stable and vascular surgery followed. No intervention. Stable to discharge home       Essential hypertension  Resume home meds on discharge     Type II diabetes mellitus   Resume home regime on discharge     Hyperlipidemia  Stable, continue PTA atorvastatin     Gastroesophageal reflux disease (GERD)  Stable, monitor     Obstructive sleep apnea (RAUL)  Continuous positive airway pressure (CPAP) per home routine     Morbid obesity      Consultations This Hospital Stay   HOSPITALIST IP CONSULT  CARDIOLOGY IP CONSULT  VASCULAR ACCESS ADULT IP CONSULT    Code Status   Prior    Time Spent on this Encounter   I, Joellen Leo DO, personally  saw the patient today and spent greater than 30 minutes discharging this patient.       Joellen Leo, [facility]  [address]  [place] [address]  Phone: [phone]  ______________________________________________________________________    Physical Exam   Vital Signs:                    Weight: 330 lbs 12.8 oz       Primary Care Physician   Dilip Ellis    Discharge Orders      CARDIAC REHAB REFERRAL      Follow-Up with Cardiology [facility]      Discharge Instructions - IF on Metformin (Glucophage or Glucovance) or Metformin containing medications on day of the procedure and for 48 hours after IV contrast given- Patients with acute kidney injury or severe chronic kidney disease (stage IV or stage V; i.e., eGFR less than 30)    IF on Metformin (Glucophage or Glucovance) or Metformin containing medications , schedule a Basic Metabolic Panel at [facility] or Primary Clinic in 48 - 72 hours post procedure and PRIOR TO resuming the Metformin or Metformin containing medications.  Hold Metformin (Glucophage or Glucovance) or Metformin containing medications until after the Basic Metabolic Panel on the 2nd or 3rd day following the procedure.  May resume after blood draw is complete.     Discharge Instructions - Follow up with [facility] Nurse Practitioner     Follow up with [facility] Nurse Practitioner at [facility] of patient preference in 7-10 days.     Reason Lipid Lowering Medications not prescribed from this order set     Reason for your hospital stay    You required admission to monitor your groin. This remains stable and your Hb remained stable. Normal follow up with cardiology per their schedule     Activity    Your activity upon discharge: activity as tolerated     Follow-up and recommended labs and tests     Follow up with cardiology as previous scheduled     Diet    Follow this diet upon discharge: regular diet       Significant Results and  Procedures   Results for orders placed or performed during the hospital encounter of 01/27/23   CTA Abdomen Pelvis with Contrast    Narrative    EXAM: CTA ABDOMEN PELVIS WITH CONTRAST  LOCATION: [facility]  DATE/TIME: 1/27/2023 9:51 PM    INDICATION: Right groin hematoma  COMPARISON: None.  TECHNIQUE: CT angiogram abdomen pelvis during arterial phase of injection of IV contrast. 2D and 3D MIP reconstructions were performed by the CT technologist. Dose reduction techniques were used.  CONTRAST: 135 mL Isovue 370    FINDINGS:  ANGIOGRAM ABDOMEN/PELVIS:   Extensive ill-defined fluid infiltrating the subcutaneous fat of the right inguinal region and right lower quadrant ventral abdominal wall. No extravasation of contrast. No pseudoaneurysm.    Abdominal Aorta: Patent and normal in caliber. Moderate aortic calcifications. No evidence of dissection or penetrating ulcer.    Celiac Artery: Patent.  Superior Mesenteric Artery: Patent. Replaced right hepatic artery arising from the superior mesenteric artery.  Right Renal Artery: Patent.  Left Renal Artery: Patent.  Inferior Mesenteric Artery: Patent.    Right Common Iliac Artery: Patent.  Right External Iliac Artery: Patent.  Right Internal Iliac Artery: Patent.  Right Common Femoral Artery: Patent.  Proximal Right Superficial Femoral Artery: Patent.  Proximal Right Deep Femoral Artery: Patent.    Left Common Iliac Artery: Patent.  Left External Iliac Artery: Patent.  Left Internal Iliac Artery: Patent.  Left Common Femoral Artery: Patent.  Proximal Left Superficial Femoral Artery: Patent.  Proximal Left Deep Femoral Artery: Patent.        LOWER CHEST: Unremarkable.    HEPATOBILIARY: Liver appears normal. Status post cholecystectomy. No biliary dilatation.    PANCREAS: Normal.    SPLEEN: Normal.    ADRENAL GLANDS: Normal.    KIDNEYS/BLADDER: Multiple low-attenuation lesions in both kidneys, with the larger ones compatible with simple renal cysts  for which no follow-up is indicated and the smaller ones too small to characterize. Excreted contrast in the urinary bladder. No   filling defects. No bladder wall thickening.    BOWEL: Extensive colonic diverticulosis. No obstruction or inflammatory change. Normal appendix. No evidence of acute appendicitis.    LYMPH NODES: No lymphadenopathy.    PELVIC ORGANS: Mildly enlarged prostate.    MUSCULOSKELETAL: Extensive ill-defined fluid/fat stranding infiltrating the subcutaneous fat of the right inguinal region and right lower quadrant ventral abdominal wall. No discrete fluid collection or hematoma. Multilevel degenerative changes of the   spine. Right total hip arthroplasty, incompletely imaged.      Impression    IMPRESSION:  1.  Subcutaneous ill-defined fluid/stranding of the right inguinal region and right lower quadrant ventral abdominal wall. No discrete fluid collection or hematoma. No active arterial extravasation. No evidence of pseudoaneurysm.   Cardiac Catheterization    Narrative      Prox LAD lesion is 50% stenosed.    2nd Diag lesion is 30% stenosed.    1st Diag lesion is 30% stenosed.    RPDA-1 lesion is 40% stenosed.    RPDA-2 lesion is 90% stenosed.     Successful AYO to mid PDA.           Discharge Medications   Discharge Medication List as of 1/28/2023  1:41 PM      START taking these medications    Details   !! aspirin (ASA) 81 MG EC tablet Take 1 tablet (81 mg) by mouth daily Start tomorrow., Disp-30 tablet, R-3, Local Print      ticagrelor (BRILINTA) 90 MG tablet Take 1 tablet (90 mg) by mouth 2 times daily Start tomorrow morning., Disp-180 tablet, R-3, Local Print       !! - Potential duplicate medications found. Please discuss with provider.      CONTINUE these medications which have NOT CHANGED    Details   amLODIPine (NORVASC) 5 MG tablet Take 5 mg by mouth daily , Historical      !! aspirin (ASA) 81 MG EC tablet Take 1 tablet (81 mg) by mouth daily, Disp-30 tablet, R-0, E-PrescribeFuture  refills by PCP Dr. [facility] with phone number [phone].      atorvastatin (LIPITOR) 80 MG tablet Take 1 tablet (80 mg) by mouth At Bedtime, Disp-90 tablet, R-3, E-Prescribe      carvedilol (COREG) 12.5 MG tablet Take 1 tablet (12.5 mg) by mouth 2 times daily (with meals), Disp-180 tablet, R-3, E-Prescribe      HYDROcodone-acetaminophen (NORCO) 5-325 MG tablet Take 1-2 tablets by mouth every 4 hours as needed for moderate to severe pain, Disp-10 tablet, R-0, E-Prescribe      losartan (COZAAR) 100 MG tablet Take 100 mg by mouth daily , Historical      Multiple Vitamin (MULTI-VITAMIN PO) Historical      spironolactone (ALDACTONE) 25 MG tablet Take 1 tablet (25 mg) by mouth daily, Disp-90 tablet, R-3, E-Prescribe      metFORMIN (GLUCOPHAGE-XR) 500 MG 24 hr tablet Take 500 mg by mouth daily (with dinner), Historical      nitroGLYcerin (NITROSTAT) 0.4 MG sublingual tablet For chest pain place 1 tablet under the tongue every 5 minutes for 3 doses. If symptoms persist 5 minutes after 1st dose call 911., Disp-15 tablet, R-0, E-PrescribeFuture refills by PCP Dr. [facility] with phone number [phone].       !! - Potential duplicate medications found. Please discuss with provider.      STOP taking these medications       naproxen sodium (ANAPROX) 220 MG tablet Comments:   Reason for Stopping:             Allergies   No Known Allergies

## 2023-01-30 ENCOUNTER — TELEPHONE (OUTPATIENT)
Dept: CARDIOLOGY | Facility: CLINIC | Age: 68
End: 2023-01-30
Payer: COMMERCIAL

## 2023-01-30 NOTE — TELEPHONE ENCOUNTER
Patient was admitted to [facility] on 1/27/23 with known hx of coronary artery disease with increased angina over the past 6 months with known residual RPDA stenosis. Here for OP LHC.    PMH: CAD s/p AYO to LAD 3/92078 with residual RPDA stenosis, ICM, DM, HTN, RAUL.    1/27/23: Coronary angiogram via RFA resulted in AYO to proximal to mid PDA. Post procedure was complicated by right groin bleeding/hematoma without bruit. Pt was admitted overnight for observation. Abdomen/pelvis CT was negative.    Pt was started on ASA and Brilinta at time of discharge. PTA NTG was continued.    Called patient to discuss any post hospital d/c questions, review medication changes, and confirm f/u appts. Patient denied any questions regarding new medications or changes to PTA medications.     RN confirmed with patient that he was d/c with an adequate supply of the antiplatelet Brilinta, and reminded of importance of taking without interruption.     Pt has an Rx for PRN SL Nitroglycerin.     Patient denied any SOB, chest pain, fever or light headedness.     RFA cardiac cath site is without bleeding, swelling, redness or signs of infection. States is tender and bruised.    RN confirmed with patient that he needs to be scheduled for cardiology [facility] OV. Dr. Wu's Team RN and scheduling phone numbers were provided.    Cardiac rehab still needs to be scheduled. Pt states they have been in contact with him. He wants to wait until RFA hematoma has resolved.    Patient advised to call clinic with any cardiac related questions or concerns prior to this marge't. Patient verbalized understanding and agreed with plan. DEN Gentile RN.

## 2023-01-31 ENCOUNTER — TELEPHONE (OUTPATIENT)
Dept: CARDIOLOGY | Facility: CLINIC | Age: 68
End: 2023-01-31

## 2023-01-31 ENCOUNTER — LAB (OUTPATIENT)
Dept: LAB | Facility: CLINIC | Age: 68
End: 2023-01-31
Payer: COMMERCIAL

## 2023-01-31 DIAGNOSIS — I25.10 CORONARY ARTERY DISEASE INVOLVING NATIVE CORONARY ARTERY OF NATIVE HEART WITHOUT ANGINA PECTORIS: ICD-10-CM

## 2023-01-31 LAB
ANION GAP SERPL CALCULATED.3IONS-SCNC: 16 MMOL/L (ref 7–15)
ATRIAL RATE - MUSE: 56 BPM
ATRIAL RATE - MUSE: 66 BPM
ATRIAL RATE - MUSE: 79 BPM
BUN SERPL-MCNC: 16.3 MG/DL (ref 8–23)
CALCIUM SERPL-MCNC: 9.6 MG/DL (ref 8.8–10.2)
CHLORIDE SERPL-SCNC: 102 MMOL/L (ref 98–107)
CREAT SERPL-MCNC: 0.95 MG/DL (ref 0.67–1.17)
DEPRECATED HCO3 PLAS-SCNC: 23 MMOL/L (ref 22–29)
DIASTOLIC BLOOD PRESSURE - MUSE: NORMAL MMHG
GFR SERPL CREATININE-BSD FRML MDRD: 87 ML/MIN/1.73M2
GLUCOSE SERPL-MCNC: 132 MG/DL (ref 70–99)
INTERPRETATION ECG - MUSE: NORMAL
P AXIS - MUSE: 29 DEGREES
P AXIS - MUSE: 31 DEGREES
P AXIS - MUSE: 62 DEGREES
POTASSIUM SERPL-SCNC: 4.3 MMOL/L (ref 3.4–5.3)
PR INTERVAL - MUSE: 152 MS
PR INTERVAL - MUSE: 162 MS
PR INTERVAL - MUSE: 170 MS
QRS DURATION - MUSE: 104 MS
QRS DURATION - MUSE: 108 MS
QRS DURATION - MUSE: 112 MS
QT - MUSE: 380 MS
QT - MUSE: 412 MS
QT - MUSE: 440 MS
QTC - MUSE: 424 MS
QTC - MUSE: 431 MS
QTC - MUSE: 435 MS
R AXIS - MUSE: -10 DEGREES
R AXIS - MUSE: -15 DEGREES
R AXIS - MUSE: 9 DEGREES
SODIUM SERPL-SCNC: 141 MMOL/L (ref 136–145)
SYSTOLIC BLOOD PRESSURE - MUSE: NORMAL MMHG
T AXIS - MUSE: 25 DEGREES
T AXIS - MUSE: 57 DEGREES
T AXIS - MUSE: 84 DEGREES
VENTRICULAR RATE- MUSE: 56 BPM
VENTRICULAR RATE- MUSE: 66 BPM
VENTRICULAR RATE- MUSE: 79 BPM

## 2023-01-31 PROCEDURE — 36415 COLL VENOUS BLD VENIPUNCTURE: CPT | Performed by: INTERNAL MEDICINE

## 2023-01-31 PROCEDURE — 80048 BASIC METABOLIC PNL TOTAL CA: CPT | Performed by: INTERNAL MEDICINE

## 2023-01-31 NOTE — TELEPHONE ENCOUNTER
Bmp 1/31/2023 noted. Ordered for post-angiogram review to restart metformin.  1140 spoke with patient to restart his metformin. Patient verbalized understanding and agreed with plan.

## 2023-01-31 NOTE — TELEPHONE ENCOUNTER
Watch for BMP for metformin restart    Detail message left for Patient regarding normal lab and ok to restart Metformin as prescribed.     Patient will call back with any questions or concerns,     BMP 1-31-23 done post coronary angiogram 1-27-23, - Successful AYO to mid PDA.    Na 141, K+ 4.3, BUN 16  , Creat 0.95 , GFR 87,     Patient on Metformin. mg DAILY WITH DINNER.     F/Up [facility] 2-6-23 for post angiogram.

## 2023-02-06 ENCOUNTER — OFFICE VISIT (OUTPATIENT)
Dept: CARDIOLOGY | Facility: CLINIC | Age: 68
End: 2023-02-06
Attending: INTERNAL MEDICINE
Payer: COMMERCIAL

## 2023-02-06 VITALS
HEIGHT: 74 IN | OXYGEN SATURATION: 97 % | SYSTOLIC BLOOD PRESSURE: 119 MMHG | DIASTOLIC BLOOD PRESSURE: 79 MMHG | BODY MASS INDEX: 40.43 KG/M2 | HEART RATE: 67 BPM | RESPIRATION RATE: 17 BRPM | WEIGHT: 315 LBS

## 2023-02-06 DIAGNOSIS — E11.9 TYPE 2 DIABETES MELLITUS WITHOUT COMPLICATION, WITHOUT LONG-TERM CURRENT USE OF INSULIN (H): ICD-10-CM

## 2023-02-06 DIAGNOSIS — E66.01 MORBID OBESITY DUE TO EXCESS CALORIES (H): ICD-10-CM

## 2023-02-06 DIAGNOSIS — Z98.890 STATUS POST CORONARY ANGIOGRAM: ICD-10-CM

## 2023-02-06 DIAGNOSIS — E78.5 DYSLIPIDEMIA: ICD-10-CM

## 2023-02-06 DIAGNOSIS — I20.0 UNSTABLE ANGINA (H): ICD-10-CM

## 2023-02-06 DIAGNOSIS — I10 BENIGN ESSENTIAL HYPERTENSION: ICD-10-CM

## 2023-02-06 DIAGNOSIS — I25.5 ISCHEMIC CARDIOMYOPATHY: ICD-10-CM

## 2023-02-06 DIAGNOSIS — I25.10 CORONARY ARTERY DISEASE INVOLVING NATIVE CORONARY ARTERY OF NATIVE HEART WITHOUT ANGINA PECTORIS: Primary | ICD-10-CM

## 2023-02-06 DIAGNOSIS — G47.33 OSA (OBSTRUCTIVE SLEEP APNEA): ICD-10-CM

## 2023-02-06 PROCEDURE — 99214 OFFICE O/P EST MOD 30 MIN: CPT | Performed by: NURSE PRACTITIONER

## 2023-02-06 NOTE — PATIENT INSTRUCTIONS
Thank you for your visit with the [facility] today.    Today's plan:   Follow up with [facility] this Spring/Summer for a check-in.  Keep your heart medications the same.  Take ASA and Brilinta for a full year.  Do not miss any doses.    If you have questions or concerns, please do not hesitate to call my nursing support team at [phone].    Scheduling phone number: [phone]  [facility] Number: [phone]    It was a pleasure seeing you today.     BRODY Bergman, CNP  Nurse Practitioner  [facility]  February 6, 2023  ________________________________________________________

## 2023-02-06 NOTE — PROGRESS NOTES
~Cardiology Clinic Visit~    Primary Cardiologist: Dr. Wu  Last Office Visit: 1/10/2023  Reason for visit: post-angiogram follow up    History of Present Illness    Paras Ramos is a very pleasant 67 year old male with a past medical history notable for CAD s/p AYO x2 to LAD 3/2021, HTN, ICM, dyslipidemia, family history of CAD, RAUL/CPAP, Obesity, DM2, COVID-19 + April 2021.    He originally established in 2021 after experiencing substernal chest discomfort.  An echocardiogram done at that time demonstrated severe LV dysfunction with EF 25-30%.  It appeared to be global in nature, but he was also recommended additional testing with a coronary angiogram.    A LHC performed on 3/9/2022 showed two-vessel obstructive coronary artery disease with diffuse mid to distal LAD stenosis that was revascularized with 2 AYO.  He also had a focal 70% stenosis in the RPDA, which was initially thought to be appropriate for medical therapy.    Patient distress cardiovascular MRI on 6/2/21 which demonstrated moderately reduced LV function with EF of 36%.  No ischemia on stress perfusion imaging was demonstrated, and subendocardial enhancement was consistent with prior infarct in the circumflex area.    At his last visit, patient had concerns regarding exertional dyspnea and chest discomfort over the past 6 months.  The symptoms have become more noticeable and prolonged and did relieved with rest.  A cardiovascular MRI without contrast on 1/4/2022 due to revealed an improved LV function to 43%, and normal LV systolic function.    Interval 02/06/23    Patient is doing well from a cardiac perspective.  Has no new or concerning symptoms.  He is quite bruised after his angiogram.  Denies any lower extremity numbness, tingling or swelling.  Has had no recurrent bleeding since his angiogram.  Denies chest pain, shortness of breath, MANTILLA, LH or dizziness, palpitations.  Aching all of his medications as ordered, has not missed  any doses.  He is anticipating establishing with cardiac rehab soon as his angiogram site is feeling more stable at this point.    We reviewed his most recent echocardiogram; notably discussed, the excellent improvement in his EF to normal range.    Impression    Recent exertional dyspnea and chest discomfort   Coronary artery disease  S/p PCI with AYO to the LAD, 03/2021  Residual moderate RPDA stenosis tx with medical management  Ischemic cardiomyopathy, recovered to 55-60%  -No current symptoms.  -Coronary angiogram 1/27/2023 with successful AYO to mPDA for 90% stenosis.  Angiographically, the study revealed pLAD stenosis of 50%, D2 30%, D1 30%, RPDA- 40%.  -Has not initiated outpatient cardiac rehab at this time yet.  - Post-angiogram c/b bleeding; hemoglobin stable at 13.9  -On ASA, Brilinta, carvedilol, spironolactone, losartan.  -1/4/22 cMRI with Evidence of improvement in LV function.  -1/25/2023 echocardiogram with EF 55-60%.  Full LV and RV visualization difficult to assess due to body habitus, however there was no significant valvular disease, EF markedly improved.    Diabetes mellitus, on metformin  Hypertension, on carvedilol, amlodipine, losartan  Obstructive sleep apnea  __________________________________________________________________    Plan:  1. Continue current medications --> DAPT (ASA, Brilinta) uninterrupted for 1-year.  2. Continue other cardiac medications as ordered; no changes today.  3. Start outpatient cardiac rehab.  4. Continue healthy lifestyle changes for risk reduction.  5. Will follow up with [facility] in ~3-4 months after recent stent placement.  __________________________________________________________________    Thank you for the opportunity to participate in this pleasant patient's care.    We would be happy to see this patient sooner for any concerns in the meantime.    BRODY Bergman, CNP   Nurse Practitioner  [facility]    Today's clinic visit  entailed:  Review of the result(s) of each unique test - echo x2, angiogram, labs  Prescription drug management    The level of medical decision making during this visit was of moderate complexity.    Orders this Visit:  Orders Placed This Encounter   Procedures     Follow-Up with Cardiology [facility]     No orders of the defined types were placed in this encounter.    There are no discontinued medications.  Encounter Diagnoses   Name Primary?     Coronary artery disease involving native coronary artery of native heart without angina pectoris Yes     Ischemic cardiomyopathy      Type 2 diabetes mellitus without complication, without long-term current use of insulin (H)      Morbid obesity due to excess calories (H)      RAUL (obstructive sleep apnea)      Status post coronary angiogram      Benign essential hypertension      Dyslipidemia      Unstable angina (H)      CURRENT MEDICATIONS:  Current Outpatient Medications   Medication Sig Dispense Refill     amLODIPine (NORVASC) 5 MG tablet Take 5 mg by mouth daily        aspirin (ASA) 81 MG EC tablet Take 1 tablet (81 mg) by mouth daily Start tomorrow. 30 tablet 3     aspirin (ASA) 81 MG EC tablet Take 1 tablet (81 mg) by mouth daily 30 tablet 0     atorvastatin (LIPITOR) 80 MG tablet Take 1 tablet (80 mg) by mouth At Bedtime 90 tablet 3     carvedilol (COREG) 12.5 MG tablet Take 1 tablet (12.5 mg) by mouth 2 times daily (with meals) 180 tablet 3     HYDROcodone-acetaminophen (NORCO) 5-325 MG tablet Take 1-2 tablets by mouth every 4 hours as needed for moderate to severe pain 10 tablet 0     losartan (COZAAR) 100 MG tablet Take 100 mg by mouth daily        metFORMIN (GLUCOPHAGE-XR) 500 MG 24 hr tablet Take 500 mg by mouth daily (with dinner)       Multiple Vitamin (MULTI-VITAMIN PO)        nitroGLYcerin (NITROSTAT) 0.4 MG sublingual tablet For chest pain place 1 tablet under the tongue every 5 minutes for 3 doses. If symptoms persist 5 minutes after 1st dose call 141. 51  "tablet 0     spironolactone (ALDACTONE) 25 MG tablet Take 1 tablet (25 mg) by mouth daily 90 tablet 3     ticagrelor (BRILINTA) 90 MG tablet Take 1 tablet (90 mg) by mouth 2 times daily Start tomorrow morning. 180 tablet 3     ALLERGIES   No Known Allergies  PAST MEDICAL, SURGICAL, FAMILY, SOCIAL HISTORY:  History was reviewed and updated as needed, see medical record.    Review of Systems:  Review Of Systems  Skin: positive for bruising  Eyes: negative  Ears/Nose/Throat: negative  Respiratory: No shortness of breath, dyspnea on exertion, cough, or hemoptysis  Cardiovascular: negative, palpitations, chest pain, exertional chest pain or pressure, paroxysmal nocturnal dyspnea, dyspnea on exertion and syncope or near-syncope  Gastrointestinal: negative, melena and hematochezia  Genitourinary: negative and hematuria  Musculoskeletal: negative  Neurologic: negative  Psychiatric: negative  Hematologic/Lymphatic/Immunologic: negative and bleeding disorder  Endocrine: negative     Physical Exam:    Vitals: /79   Pulse 67   Resp 17   Ht 1.89 m (6' 2.4\")   Wt (!) 154.4 kg (340 lb 8 oz)   SpO2 97%   BMI 43.25 kg/m    Constitutional: Appears stated age, well nourished, NAD.  Eyes: Pupils equal, round. Sclerae anicteric.   HEENT: Normocephalic, atraumatic.   Neck: Supple. Carotid pulses full and equal. No carotid bruit.  No JVD appreciated.  Respiratory: Non-labored. Lungs CTAB.  Cardiovascular: RRR, distant, normal S1 and S2. No M/G/R.  No edema.  GI: Soft, obese habitus, non-distended, non-tender.  Skin: Warm and dry. No rashes, cyanosis, edema.  Musculoskeletal/Extremities: Symmetrical movement to all extremities.  Neurologic: No gross focal deficits. Alert, awake, and oriented to all spheres.  Psychiatric: Affect appropriate. Mentation normal.    Recent Lab Results:  LIPID RESULTS:  Lab Results   Component Value Date    CHOL 116 01/27/2023    CHOL 177 03/09/2021    HDL 41 01/27/2023    HDL 46 03/09/2021    LDL 41 " 01/27/2023    LDL 98 03/09/2021    TRIG 172 (H) 01/27/2023    TRIG 167 (H) 03/09/2021     LIVER ENZYME RESULTS:  Lab Results   Component Value Date    AST 18 05/27/2021    ALT 32 05/27/2021     CBC RESULTS:  Lab Results   Component Value Date    WBC 8.3 01/27/2023    WBC 8.5 05/27/2021    RBC 4.60 01/27/2023    RBC 4.74 05/27/2021    HGB 13.9 01/28/2023    HGB 13.9 05/27/2021    HCT 42.9 01/27/2023    HCT 44.5 05/27/2021    MCV 93 01/27/2023    MCV 94 05/27/2021    MCH 31.1 01/27/2023    MCH 29.3 05/27/2021    MCHC 33.3 01/27/2023    MCHC 31.2 (L) 05/27/2021    RDW 12.7 01/27/2023    RDW 14.2 05/27/2021     01/27/2023     05/27/2021     BMP RESULTS:  Lab Results   Component Value Date     01/31/2023     05/27/2021    POTASSIUM 4.3 01/31/2023    POTASSIUM 4.1 05/27/2021    CHLORIDE 102 01/31/2023    CHLORIDE 107 05/27/2021    CO2 23 01/31/2023    CO2 28 05/27/2021    ANIONGAP 16 (H) 01/31/2023    ANIONGAP 5 05/27/2021     (H) 01/31/2023     (H) 05/27/2021    BUN 16.3 01/31/2023    BUN 12 05/27/2021    CR 0.95 01/31/2023    CR 0.95 05/27/2021    GFRESTIMATED 87 01/31/2023    GFRESTIMATED 83 05/27/2021    GFRESTBLACK >90 05/27/2021    CASE 9.6 01/31/2023    CASE 9.2 05/27/2021      A1C RESULTS:  Lab Results   Component Value Date    A1C 7.0 (H) 03/08/2021     INR RESULTS:  Lab Results   Component Value Date    INR 1.02 01/27/2023

## 2023-02-06 NOTE — LETTER
2/6/2023    Dilip Ellis MD  [address] [facility] [address]    RE: Paras Ramos       Dear Colleague,     I had the pleasure of seeing Paras Ramos in the [facility].              ~Cardiology Clinic Visit~    Primary Cardiologist: Dr. Wu  Last Office Visit: 1/10/2023  Reason for visit: post-angiogram follow up    History of Present Illness    Paras Ramos is a very pleasant 67 year old male with a past medical history notable for CAD s/p AYO x2 to LAD 3/2021, HTN, ICM, dyslipidemia, family history of CAD, RAUL/CPAP, Obesity, DM2, COVID-19 + April 2021.    He originally established in 2021 after experiencing substernal chest discomfort.  An echocardiogram done at that time demonstrated severe LV dysfunction with EF 25-30%.  It appeared to be global in nature, but he was also recommended additional testing with a coronary angiogram.    A LHC performed on 3/9/2022 showed two-vessel obstructive coronary artery disease with diffuse mid to distal LAD stenosis that was revascularized with 2 AYO.  He also had a focal 70% stenosis in the RPDA, which was initially thought to be appropriate for medical therapy.    Patient distress cardiovascular MRI on 6/2/21 which demonstrated moderately reduced LV function with EF of 36%.  No ischemia on stress perfusion imaging was demonstrated, and subendocardial enhancement was consistent with prior infarct in the circumflex area.    At his last visit, patient had concerns regarding exertional dyspnea and chest discomfort over the past 6 months.  The symptoms have become more noticeable and prolonged and did relieved with rest.  A cardiovascular MRI without contrast on 1/4/2022 due to revealed an improved LV function to 43%, and normal LV systolic function.    Interval 02/06/23    Patient is doing well from a cardiac perspective.  Has no new or concerning symptoms.  He is quite bruised after his angiogram.  Denies any lower extremity numbness,  tingling or swelling.  Has had no recurrent bleeding since his angiogram.  Denies chest pain, shortness of breath, MANTILLA, LH or dizziness, palpitations.  Aching all of his medications as ordered, has not missed any doses.  He is anticipating establishing with cardiac rehab soon as his angiogram site is feeling more stable at this point.    We reviewed his most recent echocardiogram; notably discussed, the excellent improvement in his EF to normal range.    Impression    Recent exertional dyspnea and chest discomfort   Coronary artery disease  S/p PCI with AYO to the LAD, 03/2021  Residual moderate RPDA stenosis tx with medical management  Ischemic cardiomyopathy, recovered to 55-60%  -No current symptoms.  -Coronary angiogram 1/27/2023 with successful AYO to mPDA for 90% stenosis.  Angiographically, the study revealed pLAD stenosis of 50%, D2 30%, D1 30%, RPDA- 40%.  -Has not initiated outpatient cardiac rehab at this time yet.  - Post-angiogram c/b bleeding; hemoglobin stable at 13.9  -On ASA, Brilinta, carvedilol, spironolactone, losartan.  -1/4/22 cMRI with Evidence of improvement in LV function.  -1/25/2023 echocardiogram with EF 55-60%.  Full LV and RV visualization difficult to assess due to body habitus, however there was no significant valvular disease, EF markedly improved.    Diabetes mellitus, on metformin  Hypertension, on carvedilol, amlodipine, losartan  Obstructive sleep apnea  __________________________________________________________________    Plan:  1. Continue current medications --> DAPT (ASA, Brilinta) uninterrupted for 1-year.  2. Continue other cardiac medications as ordered; no changes today.  3. Start outpatient cardiac rehab.  4. Continue healthy lifestyle changes for risk reduction.  5. Will follow up with [facility] in ~3-4 months after recent stent placement.  __________________________________________________________________    Thank you for the opportunity to participate in this pleasant  patient's care.    We would be happy to see this patient sooner for any concerns in the meantime.    BORDY Bergman, CNP   Nurse Practitioner  [facility]    Today's clinic visit entailed:  Review of the result(s) of each unique test - echo x2, angiogram, labs  Prescription drug management    The level of medical decision making during this visit was of moderate complexity.    Orders this Visit:  Orders Placed This Encounter   Procedures     Follow-Up with Cardiology [facility]     No orders of the defined types were placed in this encounter.    There are no discontinued medications.  Encounter Diagnoses   Name Primary?     Coronary artery disease involving native coronary artery of native heart without angina pectoris Yes     Ischemic cardiomyopathy      Type 2 diabetes mellitus without complication, without long-term current use of insulin (H)      Morbid obesity due to excess calories (H)      RAUL (obstructive sleep apnea)      Status post coronary angiogram      Benign essential hypertension      Dyslipidemia      Unstable angina (H)      CURRENT MEDICATIONS:  Current Outpatient Medications   Medication Sig Dispense Refill     amLODIPine (NORVASC) 5 MG tablet Take 5 mg by mouth daily        aspirin (ASA) 81 MG EC tablet Take 1 tablet (81 mg) by mouth daily Start tomorrow. 30 tablet 3     aspirin (ASA) 81 MG EC tablet Take 1 tablet (81 mg) by mouth daily 30 tablet 0     atorvastatin (LIPITOR) 80 MG tablet Take 1 tablet (80 mg) by mouth At Bedtime 90 tablet 3     carvedilol (COREG) 12.5 MG tablet Take 1 tablet (12.5 mg) by mouth 2 times daily (with meals) 180 tablet 3     HYDROcodone-acetaminophen (NORCO) 5-325 MG tablet Take 1-2 tablets by mouth every 4 hours as needed for moderate to severe pain 10 tablet 0     losartan (COZAAR) 100 MG tablet Take 100 mg by mouth daily        metFORMIN (GLUCOPHAGE-XR) 500 MG 24 hr tablet Take 500 mg by mouth daily (with dinner)       Multiple Vitamin  "(MULTI-VITAMIN PO)        nitroGLYcerin (NITROSTAT) 0.4 MG sublingual tablet For chest pain place 1 tablet under the tongue every 5 minutes for 3 doses. If symptoms persist 5 minutes after 1st dose call 911. 15 tablet 0     spironolactone (ALDACTONE) 25 MG tablet Take 1 tablet (25 mg) by mouth daily 90 tablet 3     ticagrelor (BRILINTA) 90 MG tablet Take 1 tablet (90 mg) by mouth 2 times daily Start tomorrow morning. 180 tablet 3     ALLERGIES   No Known Allergies  PAST MEDICAL, SURGICAL, FAMILY, SOCIAL HISTORY:  History was reviewed and updated as needed, see medical record.    Review of Systems:  Review Of Systems  Skin: positive for bruising  Eyes: negative  Ears/Nose/Throat: negative  Respiratory: No shortness of breath, dyspnea on exertion, cough, or hemoptysis  Cardiovascular: negative, palpitations, chest pain, exertional chest pain or pressure, paroxysmal nocturnal dyspnea, dyspnea on exertion and syncope or near-syncope  Gastrointestinal: negative, melena and hematochezia  Genitourinary: negative and hematuria  Musculoskeletal: negative  Neurologic: negative  Psychiatric: negative  Hematologic/Lymphatic/Immunologic: negative and bleeding disorder  Endocrine: negative     Physical Exam:    Vitals: /79   Pulse 67   Resp 17   Ht 1.89 m (6' 2.4\")   Wt (!) 154.4 kg (340 lb 8 oz)   SpO2 97%   BMI 43.25 kg/m    Constitutional: Appears stated age, well nourished, NAD.  Eyes: Pupils equal, round. Sclerae anicteric.   HEENT: Normocephalic, atraumatic.   Neck: Supple. Carotid pulses full and equal. No carotid bruit.  No JVD appreciated.  Respiratory: Non-labored. Lungs CTAB.  Cardiovascular: RRR, distant, normal S1 and S2. No M/G/R.  No edema.  GI: Soft, obese habitus, non-distended, non-tender.  Skin: Warm and dry. No rashes, cyanosis, edema.  Musculoskeletal/Extremities: Symmetrical movement to all extremities.  Neurologic: No gross focal deficits. Alert, awake, and oriented to all spheres.  Psychiatric: " Affect appropriate. Mentation normal.    Recent Lab Results:  LIPID RESULTS:  Lab Results   Component Value Date    CHOL 116 01/27/2023    CHOL 177 03/09/2021    HDL 41 01/27/2023    HDL 46 03/09/2021    LDL 41 01/27/2023    LDL 98 03/09/2021    TRIG 172 (H) 01/27/2023    TRIG 167 (H) 03/09/2021     LIVER ENZYME RESULTS:  Lab Results   Component Value Date    AST 18 05/27/2021    ALT 32 05/27/2021     CBC RESULTS:  Lab Results   Component Value Date    WBC 8.3 01/27/2023    WBC 8.5 05/27/2021    RBC 4.60 01/27/2023    RBC 4.74 05/27/2021    HGB 13.9 01/28/2023    HGB 13.9 05/27/2021    HCT 42.9 01/27/2023    HCT 44.5 05/27/2021    MCV 93 01/27/2023    MCV 94 05/27/2021    MCH 31.1 01/27/2023    MCH 29.3 05/27/2021    MCHC 33.3 01/27/2023    MCHC 31.2 (L) 05/27/2021    RDW 12.7 01/27/2023    RDW 14.2 05/27/2021     01/27/2023     05/27/2021     BMP RESULTS:  Lab Results   Component Value Date     01/31/2023     05/27/2021    POTASSIUM 4.3 01/31/2023    POTASSIUM 4.1 05/27/2021    CHLORIDE 102 01/31/2023    CHLORIDE 107 05/27/2021    CO2 23 01/31/2023    CO2 28 05/27/2021    ANIONGAP 16 (H) 01/31/2023    ANIONGAP 5 05/27/2021     (H) 01/31/2023     (H) 05/27/2021    BUN 16.3 01/31/2023    BUN 12 05/27/2021    CR 0.95 01/31/2023    CR 0.95 05/27/2021    GFRESTIMATED 87 01/31/2023    GFRESTIMATED 83 05/27/2021    GFRESTBLACK >90 05/27/2021    CASE 9.6 01/31/2023    CASE 9.2 05/27/2021      A1C RESULTS:  Lab Results   Component Value Date    A1C 7.0 (H) 03/08/2021     INR RESULTS:  Lab Results   Component Value Date    INR 1.02 01/27/2023       Thank you for allowing me to participate in the care of your patient.      Sincerely,     BRODY Bergman [facility]  cc:   Bryon Soni MD  [address]  [place] [address]

## 2023-02-23 DIAGNOSIS — R93.1 ABNORMAL FINDINGS ON DIAGNOSTIC IMAGING OF HEART AND CORONARY CIRCULATION: ICD-10-CM

## 2023-03-09 DIAGNOSIS — I10 BENIGN ESSENTIAL HYPERTENSION: ICD-10-CM

## 2023-03-09 DIAGNOSIS — I25.110 CORONARY ARTERY DISEASE INVOLVING NATIVE CORONARY ARTERY OF NATIVE HEART WITH UNSTABLE ANGINA PECTORIS (H): ICD-10-CM

## 2023-03-09 RX ORDER — CARVEDILOL 12.5 MG/1
12.5 TABLET ORAL 2 TIMES DAILY WITH MEALS
Qty: 180 TABLET | Refills: 4 | Status: SHIPPED | OUTPATIENT
Start: 2023-03-09 | End: 2024-03-12

## 2023-03-21 ENCOUNTER — HOSPITAL ENCOUNTER (OUTPATIENT)
Dept: CARDIAC REHAB | Facility: HOSPITAL | Age: 68
Discharge: HOME | End: 2023-03-21
Attending: INTERNAL MEDICINE
Payer: COMMERCIAL

## 2023-03-21 DIAGNOSIS — R93.1 ABNORMAL FINDINGS ON DIAGNOSTIC IMAGING OF HEART AND CORONARY CIRCULATION: ICD-10-CM

## 2023-03-21 PROCEDURE — 93798 PHYS/QHP OP CAR RHAB W/ECG: CPT

## 2023-03-21 PROCEDURE — 93797 PHYS/QHP OP CAR RHAB WO ECG: CPT | Mod: 59

## 2023-03-27 ENCOUNTER — HOSPITAL ENCOUNTER (OUTPATIENT)
Dept: CARDIAC REHAB | Facility: HOSPITAL | Age: 68
Discharge: HOME | End: 2023-03-27
Attending: INTERNAL MEDICINE
Payer: COMMERCIAL

## 2023-03-27 PROCEDURE — 93798 PHYS/QHP OP CAR RHAB W/ECG: CPT

## 2023-03-29 ENCOUNTER — HOSPITAL ENCOUNTER (OUTPATIENT)
Dept: CARDIAC REHAB | Facility: HOSPITAL | Age: 68
Discharge: HOME | End: 2023-03-29
Attending: INTERNAL MEDICINE
Payer: COMMERCIAL

## 2023-03-29 PROCEDURE — 93798 PHYS/QHP OP CAR RHAB W/ECG: CPT

## 2023-03-31 ENCOUNTER — HOSPITAL ENCOUNTER (OUTPATIENT)
Dept: CARDIAC REHAB | Facility: HOSPITAL | Age: 68
Discharge: HOME | End: 2023-03-31
Attending: INTERNAL MEDICINE
Payer: COMMERCIAL

## 2023-03-31 PROCEDURE — 93798 PHYS/QHP OP CAR RHAB W/ECG: CPT

## 2023-04-03 ENCOUNTER — HOSPITAL ENCOUNTER (OUTPATIENT)
Dept: CARDIAC REHAB | Facility: HOSPITAL | Age: 68
Discharge: HOME | End: 2023-04-03
Attending: INTERNAL MEDICINE
Payer: COMMERCIAL

## 2023-04-03 PROCEDURE — 93798 PHYS/QHP OP CAR RHAB W/ECG: CPT

## 2023-04-05 ENCOUNTER — HOSPITAL ENCOUNTER (OUTPATIENT)
Dept: CARDIAC REHAB | Facility: HOSPITAL | Age: 68
Discharge: HOME | End: 2023-04-05
Attending: INTERNAL MEDICINE
Payer: COMMERCIAL

## 2023-04-05 PROCEDURE — 93798 PHYS/QHP OP CAR RHAB W/ECG: CPT

## 2023-04-07 ENCOUNTER — HOSPITAL ENCOUNTER (OUTPATIENT)
Dept: CARDIAC REHAB | Facility: HOSPITAL | Age: 68
Discharge: HOME | End: 2023-04-07
Attending: INTERNAL MEDICINE
Payer: COMMERCIAL

## 2023-04-07 PROCEDURE — 93798 PHYS/QHP OP CAR RHAB W/ECG: CPT

## 2023-04-10 ENCOUNTER — HOSPITAL ENCOUNTER (OUTPATIENT)
Dept: CARDIAC REHAB | Facility: HOSPITAL | Age: 68
Discharge: HOME | End: 2023-04-10
Attending: INTERNAL MEDICINE
Payer: COMMERCIAL

## 2023-04-10 PROCEDURE — 93798 PHYS/QHP OP CAR RHAB W/ECG: CPT

## 2023-04-17 ENCOUNTER — HOSPITAL ENCOUNTER (OUTPATIENT)
Dept: CARDIAC REHAB | Facility: HOSPITAL | Age: 68
Discharge: HOME | End: 2023-04-17
Attending: INTERNAL MEDICINE
Payer: COMMERCIAL

## 2023-04-17 PROCEDURE — 93798 PHYS/QHP OP CAR RHAB W/ECG: CPT

## 2023-04-19 ENCOUNTER — HOSPITAL ENCOUNTER (OUTPATIENT)
Dept: CARDIAC REHAB | Facility: HOSPITAL | Age: 68
Discharge: HOME | End: 2023-04-19
Attending: INTERNAL MEDICINE
Payer: COMMERCIAL

## 2023-04-19 PROCEDURE — 93798 PHYS/QHP OP CAR RHAB W/ECG: CPT

## 2023-04-21 ENCOUNTER — HOSPITAL ENCOUNTER (OUTPATIENT)
Dept: CARDIAC REHAB | Facility: HOSPITAL | Age: 68
Discharge: HOME | End: 2023-04-21
Attending: INTERNAL MEDICINE
Payer: COMMERCIAL

## 2023-04-21 PROCEDURE — 93798 PHYS/QHP OP CAR RHAB W/ECG: CPT

## 2023-04-23 ENCOUNTER — HEALTH MAINTENANCE LETTER (OUTPATIENT)
Age: 68
End: 2023-04-23

## 2023-04-24 ENCOUNTER — HOSPITAL ENCOUNTER (OUTPATIENT)
Dept: CARDIAC REHAB | Facility: HOSPITAL | Age: 68
Discharge: HOME | End: 2023-04-24
Attending: INTERNAL MEDICINE
Payer: COMMERCIAL

## 2023-04-24 PROCEDURE — 93798 PHYS/QHP OP CAR RHAB W/ECG: CPT

## 2023-04-26 ENCOUNTER — HOSPITAL ENCOUNTER (OUTPATIENT)
Dept: CARDIAC REHAB | Facility: HOSPITAL | Age: 68
Discharge: HOME | End: 2023-04-26
Attending: INTERNAL MEDICINE
Payer: COMMERCIAL

## 2023-04-26 PROCEDURE — 93798 PHYS/QHP OP CAR RHAB W/ECG: CPT

## 2023-04-28 ENCOUNTER — HOSPITAL ENCOUNTER (OUTPATIENT)
Dept: CARDIAC REHAB | Facility: HOSPITAL | Age: 68
Discharge: HOME | End: 2023-04-28
Attending: INTERNAL MEDICINE
Payer: COMMERCIAL

## 2023-04-28 PROCEDURE — 93798 PHYS/QHP OP CAR RHAB W/ECG: CPT

## 2023-05-03 ENCOUNTER — HOSPITAL ENCOUNTER (OUTPATIENT)
Dept: CARDIAC REHAB | Facility: HOSPITAL | Age: 68
Discharge: HOME | End: 2023-05-03
Attending: INTERNAL MEDICINE
Payer: COMMERCIAL

## 2023-05-03 PROCEDURE — 93798 PHYS/QHP OP CAR RHAB W/ECG: CPT

## 2023-05-05 ENCOUNTER — HOSPITAL ENCOUNTER (OUTPATIENT)
Dept: CARDIAC REHAB | Facility: HOSPITAL | Age: 68
Discharge: HOME | End: 2023-05-05
Attending: INTERNAL MEDICINE
Payer: COMMERCIAL

## 2023-05-05 PROCEDURE — 93798 PHYS/QHP OP CAR RHAB W/ECG: CPT

## 2023-05-08 ENCOUNTER — HOSPITAL ENCOUNTER (OUTPATIENT)
Dept: CARDIAC REHAB | Facility: HOSPITAL | Age: 68
Discharge: HOME | End: 2023-05-08
Attending: INTERNAL MEDICINE
Payer: COMMERCIAL

## 2023-05-08 PROCEDURE — 93798 PHYS/QHP OP CAR RHAB W/ECG: CPT

## 2023-05-10 ENCOUNTER — HOSPITAL ENCOUNTER (OUTPATIENT)
Dept: CARDIAC REHAB | Facility: HOSPITAL | Age: 68
Discharge: HOME | End: 2023-05-10
Attending: INTERNAL MEDICINE
Payer: COMMERCIAL

## 2023-05-10 PROCEDURE — 93798 PHYS/QHP OP CAR RHAB W/ECG: CPT

## 2023-05-12 ENCOUNTER — HOSPITAL ENCOUNTER (OUTPATIENT)
Dept: CARDIAC REHAB | Facility: HOSPITAL | Age: 68
Discharge: HOME | End: 2023-05-12
Attending: INTERNAL MEDICINE
Payer: COMMERCIAL

## 2023-05-12 PROCEDURE — 93798 PHYS/QHP OP CAR RHAB W/ECG: CPT

## 2023-05-15 ENCOUNTER — HOSPITAL ENCOUNTER (OUTPATIENT)
Dept: CARDIAC REHAB | Facility: HOSPITAL | Age: 68
Discharge: HOME | End: 2023-05-15
Attending: INTERNAL MEDICINE
Payer: COMMERCIAL

## 2023-05-15 PROCEDURE — 93798 PHYS/QHP OP CAR RHAB W/ECG: CPT

## 2023-05-17 ENCOUNTER — HOSPITAL ENCOUNTER (OUTPATIENT)
Dept: CARDIAC REHAB | Facility: HOSPITAL | Age: 68
Discharge: HOME | End: 2023-05-17
Attending: INTERNAL MEDICINE
Payer: COMMERCIAL

## 2023-05-17 PROCEDURE — 93798 PHYS/QHP OP CAR RHAB W/ECG: CPT

## 2023-05-22 ENCOUNTER — HOSPITAL ENCOUNTER (OUTPATIENT)
Dept: CARDIAC REHAB | Facility: HOSPITAL | Age: 68
Discharge: HOME | End: 2023-05-22
Attending: INTERNAL MEDICINE
Payer: COMMERCIAL

## 2023-05-22 PROCEDURE — 93798 PHYS/QHP OP CAR RHAB W/ECG: CPT

## 2023-05-24 ENCOUNTER — HOSPITAL ENCOUNTER (OUTPATIENT)
Dept: CARDIAC REHAB | Facility: HOSPITAL | Age: 68
Discharge: HOME | End: 2023-05-24
Attending: INTERNAL MEDICINE
Payer: COMMERCIAL

## 2023-05-24 PROCEDURE — 93798 PHYS/QHP OP CAR RHAB W/ECG: CPT

## 2023-05-26 ENCOUNTER — HOSPITAL ENCOUNTER (OUTPATIENT)
Dept: CARDIAC REHAB | Facility: HOSPITAL | Age: 68
Discharge: HOME | End: 2023-05-26
Attending: INTERNAL MEDICINE
Payer: COMMERCIAL

## 2023-05-26 PROCEDURE — 93798 PHYS/QHP OP CAR RHAB W/ECG: CPT

## 2023-05-31 ENCOUNTER — HOSPITAL ENCOUNTER (OUTPATIENT)
Dept: CARDIAC REHAB | Facility: HOSPITAL | Age: 68
Discharge: HOME | End: 2023-05-31
Attending: INTERNAL MEDICINE
Payer: COMMERCIAL

## 2023-05-31 PROCEDURE — 93798 PHYS/QHP OP CAR RHAB W/ECG: CPT

## 2023-06-02 ENCOUNTER — HOSPITAL ENCOUNTER (OUTPATIENT)
Dept: CARDIAC REHAB | Facility: HOSPITAL | Age: 68
Discharge: HOME | End: 2023-06-02
Attending: INTERNAL MEDICINE
Payer: COMMERCIAL

## 2023-06-02 PROCEDURE — 93798 PHYS/QHP OP CAR RHAB W/ECG: CPT

## 2023-06-07 ENCOUNTER — HOSPITAL ENCOUNTER (OUTPATIENT)
Dept: CARDIAC REHAB | Facility: HOSPITAL | Age: 68
Discharge: HOME | End: 2023-06-07
Attending: INTERNAL MEDICINE
Payer: COMMERCIAL

## 2023-06-07 PROCEDURE — 93798 PHYS/QHP OP CAR RHAB W/ECG: CPT

## 2023-06-12 ENCOUNTER — MEDICAL CORRESPONDENCE (OUTPATIENT)
Dept: CARDIAC REHAB | Facility: HOSPITAL | Age: 68
End: 2023-06-12
Payer: COMMERCIAL

## 2023-06-12 ENCOUNTER — HOSPITAL ENCOUNTER (OUTPATIENT)
Dept: CARDIAC REHAB | Facility: HOSPITAL | Age: 68
Discharge: HOME | End: 2023-06-12
Attending: INTERNAL MEDICINE
Payer: COMMERCIAL

## 2023-06-12 PROCEDURE — 93798 PHYS/QHP OP CAR RHAB W/ECG: CPT

## 2023-06-14 ENCOUNTER — HOSPITAL ENCOUNTER (OUTPATIENT)
Dept: CARDIAC REHAB | Facility: HOSPITAL | Age: 68
Discharge: HOME | End: 2023-06-14
Attending: INTERNAL MEDICINE
Payer: COMMERCIAL

## 2023-06-14 PROCEDURE — 93798 PHYS/QHP OP CAR RHAB W/ECG: CPT

## 2023-06-20 ENCOUNTER — HOSPITAL ENCOUNTER (OUTPATIENT)
Dept: CARDIAC REHAB | Facility: HOSPITAL | Age: 68
Discharge: HOME | End: 2023-06-20
Attending: INTERNAL MEDICINE
Payer: COMMERCIAL

## 2023-06-20 PROCEDURE — 93798 PHYS/QHP OP CAR RHAB W/ECG: CPT

## 2023-09-14 ENCOUNTER — OFFICE VISIT (OUTPATIENT)
Dept: CARDIOLOGY | Facility: CLINIC | Age: 68
End: 2023-09-14
Payer: COMMERCIAL

## 2023-09-14 ENCOUNTER — TELEPHONE (OUTPATIENT)
Dept: CARDIOLOGY | Facility: CLINIC | Age: 68
End: 2023-09-14

## 2023-09-14 VITALS
SYSTOLIC BLOOD PRESSURE: 120 MMHG | HEIGHT: 74 IN | HEART RATE: 60 BPM | WEIGHT: 315 LBS | BODY MASS INDEX: 40.43 KG/M2 | OXYGEN SATURATION: 97 % | DIASTOLIC BLOOD PRESSURE: 80 MMHG

## 2023-09-14 DIAGNOSIS — I25.10 CORONARY ARTERY DISEASE INVOLVING NATIVE CORONARY ARTERY OF NATIVE HEART WITHOUT ANGINA PECTORIS: ICD-10-CM

## 2023-09-14 DIAGNOSIS — I25.110 CORONARY ARTERY DISEASE INVOLVING NATIVE CORONARY ARTERY OF NATIVE HEART WITH UNSTABLE ANGINA PECTORIS (H): Primary | ICD-10-CM

## 2023-09-14 DIAGNOSIS — I25.110 CORONARY ARTERY DISEASE INVOLVING NATIVE CORONARY ARTERY OF NATIVE HEART WITH UNSTABLE ANGINA PECTORIS (H): ICD-10-CM

## 2023-09-14 PROCEDURE — 99214 OFFICE O/P EST MOD 30 MIN: CPT | Performed by: NURSE PRACTITIONER

## 2023-09-14 RX ORDER — NITROGLYCERIN 0.4 MG/1
TABLET SUBLINGUAL
Qty: 15 TABLET | Refills: 0 | Status: SHIPPED | OUTPATIENT
Start: 2023-09-14

## 2023-09-14 RX ORDER — COVID-19 ANTIGEN TEST
220 KIT MISCELLANEOUS 2 TIMES DAILY WITH MEALS
COMMUNITY

## 2023-09-14 RX ORDER — ISOSORBIDE MONONITRATE 30 MG/1
15 TABLET, EXTENDED RELEASE ORAL DAILY
Qty: 45 TABLET | Refills: 3 | Status: SHIPPED | OUTPATIENT
Start: 2023-09-14

## 2023-09-14 NOTE — TELEPHONE ENCOUNTER
Discussed with patient who has verbalized understanding.  Adrienne Franklin RN on 9/14/2023 at 4:07 PM

## 2023-09-14 NOTE — PATIENT INSTRUCTIONS
Today's Recommendations    I would like you to start taking a half tablet of the isosorbide and we will see if this takes care of the remaining chest tightness you have at times  Continue to work on losing weight, I will check on holding the brillinta, ideally after January would be best to do surgery  Your diabetes and blood pressure are well controlled  I recommend you make an appointment with the sleep specialist   Please follow up with Dr. Wu in 4 months with a lipid panel before, this is a fasting lab.    Please send a [email or web address] message or call [phone] to the RN team with questions or concerns.     Scheduling number [phone]  BRODY Chowdary, CNP

## 2023-09-14 NOTE — LETTER
9/14/2023    Dilip Ellis MD  [address] [facility] [address]    RE: Paras Ramos       Dear Colleague,     I had the pleasure of seeing Paras Ramos in the [facility].  Cardiology Clinic Progress Note  Paras Ramos MRN# [id number]   YOB: 1955 Age: 68 year old   Primary Cardiologist: Dr. Wu  Reason for visit: 3 month follow up             Assessment and Plan:       1.  Coronary artery disease, s/p AYO to LAD (3/2021), s/p AYO to mPDA (1/2023)  -Residual stenosis of 50% in the proximal LAD, D2 30%, D1 30%, and RPDA 40%  -1/2023 LDL 41, on Atorvastatin 80mg   -DAPT with aspirin and brillinta    2.  Hypertension, controlled  -On carvedilol, amlodipine, losartan, and spironolactone    3.  Type 2 diabetes, controlled  -7/2023 HgbA1c 6.0%    4.  Obstructive sleep apnea, not compliant with CPAP    5. Morbid obesity, BMI 42.93    6. Ischemic cardiomyopathy, recovered  -2021 EF 25 to 30%  -1/2023 EF 55 to 60%    Paras feels overall well however continues to have symptoms concerning for atypical angina, but similar in nature to his presenting symptoms in January 2023. Considering he has residual LAD disease, although not flow limiting, I will start him on a low dose of isosorbide. Advised him to contact us if he has any side effects and not to use with PDE inhibitors with this medication.    Recommend a follow up with sleep medicine to assess for an alternative CPAP or adjust settings.     He is considering having left hip arthroplasty due to continued pain and is wondering about holding his brillinta temporarily for this surgery. It does limit his mobility and limit his ability to exercise. I did encourage him to continue exercising and working on weight loss. We did discuss the option of the [place] weight management services referral, however he has done this in the past with [facility] and prefers to wait until after his hip  arthroplasty.    His diabetes and hypertension are currently well controlled.  He is due for a repeat fasting lipid profile in January, however his previous LDL was well controlled on his current dose of atorvastatin.    Changes today: Start imdur 15mg daily     Follow up plan: Follow up with Dr. Wu in January with fasting lipid/ALT prior         History of Presenting Illness:    Paras Ramos is a very pleasant 68 year old male with a history of CAD s/p AYO x2 to LAD 3/2021, HTN, ICM, dyslipidemia, family history of CAD, RAUL/CPAP, Obesity, DM2, COVID-19 + April 2021.     Patient was initially seen in cardiology clinic in 2021 after experiencing substernal chest discomfort.  An echocardiogram at that time showed severe LV dysfunction with an EF of 25 to 30%.  A subsequent coronary angiography was done and showed two-vessel obstructive coronary disease with diffuse mid to distal LAD stenosis that was revascularized with drug-eluting stents x2.  He also had a focal stenosis of 70% in the RPDA which was originally treated with medical therapy.    In June 2021 he underwent a cardiac MRI which showed moderately reduced LV function with an EF of 36%.  No ischemia on stress perfusion imaging and subendocardial enhancement was consistent with a prior infarct in the circumflex area.    He was seen in follow-up by Dr. Wu in January 2023 at which point he was experiencing exertional chest discomfort and dyspnea over the last 6 months.  Coronary angiography was recommended which revealed a 90% and PDA lesion and was treated with AYO x1.  Residual stenosis of 50% in the proximal LAD, D2 30%, D1 30%, and RPDA 40%.  Repeat echocardiogram showed an ejection fraction of 55 to 60% without any significant valvular disease.    Patient is here today for follow-up. He continues to get chest tightness sporadically at rest and with exertion that does not radiate and resolves on its own without stopping his activity. Lasts 1-2 minutes.  He has used nitroglycerin a few years ago it did not resolve the pain and he has not tried it again since. These symptoms have been present for years, and have improved since his last intervention in January and completing cardiac rehab. He does have issues with GERD and has not tried any PPI recently.  Denies any bleeding issues and has been compliant with his dual antiplatelet therapy.    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 He goes fishing and hunting often, typically without exertional chest tightness.    He will have dizziness or lightheadedness without pre-syncope if he stands up too fast. He has not been wearing his cpap, he says he feels worse when he wears it and often takes it off at night without knowing it.     Denies tachycardia or palpitations.     Blood pressure 120/80 and HR 60 in clinic today.        Recent Hospitalizations   None in 2023          Social History      Social History     Socioeconomic History    Marital status:      Spouse name: Not on file    Number of children: Not on file    Years of education: Not on file    Highest education level: Not on file   Occupational History    Not on file   Tobacco Use    Smoking status: Never    Smokeless tobacco: Never   Substance and Sexual Activity    Alcohol use: Yes     Comment: 1 can beer every 2 weeks    Drug use: No    Sexual activity: Not on file   Other Topics Concern    Not on file   Social History Narrative    Not on file     Social Determinants of Health     Financial Resource Strain: Not on file   Food Insecurity: Not on file   Transportation Needs: Not on file   Physical Activity: Not on file   Stress: Not on file   Social Connections: Not on file   Intimate Partner Violence: Not on file   Housing Stability: Not on file            Review of Systems:   Skin:  not assessed     Eyes:       ENT:  not assessed    Respiratory:  Positive for dyspnea on  "exertion;sleep apnea  Cardiovascular:    Positive for;chest pain;edema;lightheadedness  Gastroenterology: not assessed    Genitourinary:  not assessed    Musculoskeletal:  not assessed    Neurologic:  not assessed    Psychiatric:  not assessed    Heme/Lymph/Imm:  not assessed    Endocrine:  not assessed           Physical Exam:   Vitals: /80   Pulse 60   Ht 1.88 m (6' 2\")   Wt (!) 151.7 kg (334 lb 6.4 oz)   SpO2 97%   BMI 42.93 kg/m     Wt Readings from Last 4 Encounters:   09/14/23 (!) 151.7 kg (334 lb 6.4 oz)   02/06/23 (!) 154.4 kg (340 lb 8 oz)   01/28/23 (!) 150 kg (330 lb 12.8 oz)   01/10/23 (!) 155.1 kg (342 lb)     GEN: well nourished, in no acute distress.  HEENT:  Pupils equal, round. Sclerae nonicteric.   NECK: Supple, no masses appreciated.  No JVD with patient supine.  C/V:  Regular rate and rhythm, no murmur, rub or gallop.    RESP: Respirations are unlabored. Clear to auscultation bilaterally without wheezing, rales, or rhonchi.  GI: Abdomen soft, nontender.  EXTREM: Trace bilateral LE edema.  NEURO: Alert and oriented, cooperative.  SKIN: Warm and dry.       Data:     LIPID RESULTS:  Lab Results   Component Value Date    CHOL 116 01/27/2023    CHOL 177 03/09/2021    HDL 41 01/27/2023    HDL 46 03/09/2021    LDL 41 01/27/2023    LDL 98 03/09/2021    TRIG 172 (H) 01/27/2023    TRIG 167 (H) 03/09/2021     LIVER ENZYME RESULTS:  Lab Results   Component Value Date    AST 18 05/27/2021    ALT 32 05/27/2021     CBC RESULTS:  Lab Results   Component Value Date    WBC 8.3 01/27/2023    WBC 8.5 05/27/2021    RBC 4.60 01/27/2023    RBC 4.74 05/27/2021    HGB 13.9 01/28/2023    HGB 13.9 05/27/2021    HCT 42.9 01/27/2023    HCT 44.5 05/27/2021    MCV 93 01/27/2023    MCV 94 05/27/2021    MCH 31.1 01/27/2023    MCH 29.3 05/27/2021    MCHC 33.3 01/27/2023    MCHC 31.2 (L) 05/27/2021    RDW 12.7 01/27/2023    RDW 14.2 05/27/2021     01/27/2023     05/27/2021     BMP RESULTS:  Lab Results "   Component Value Date     01/31/2023     05/27/2021    POTASSIUM 4.3 01/31/2023    POTASSIUM 4.1 05/27/2021    CHLORIDE 102 01/31/2023    CHLORIDE 107 05/27/2021    CO2 23 01/31/2023    CO2 28 05/27/2021    ANIONGAP 16 (H) 01/31/2023    ANIONGAP 5 05/27/2021     (H) 01/31/2023     (H) 05/27/2021    BUN 16.3 01/31/2023    BUN 12 05/27/2021    CR 0.95 01/31/2023    CR 0.95 05/27/2021    GFRESTIMATED 87 01/31/2023    GFRESTIMATED 83 05/27/2021    GFRESTBLACK >90 05/27/2021    CASE 9.6 01/31/2023    CASE 9.2 05/27/2021      A1C RESULTS:  Lab Results   Component Value Date    A1C 7.0 (H) 03/08/2021     INR RESULTS:  Lab Results   Component Value Date    INR 1.02 01/27/2023            Medications     Current Outpatient Medications   Medication Sig Dispense Refill    amLODIPine (NORVASC) 5 MG tablet Take 5 mg by mouth daily       aspirin (ASA) 81 MG EC tablet Take 1 tablet (81 mg) by mouth daily Start tomorrow. 30 tablet 3    atorvastatin (LIPITOR) 80 MG tablet Take 1 tablet (80 mg) by mouth At Bedtime 90 tablet 3    carvedilol (COREG) 12.5 MG tablet Take 1 tablet (12.5 mg) by mouth 2 times daily (with meals) 180 tablet 4    HYDROcodone-acetaminophen (NORCO) 5-325 MG tablet Take 1-2 tablets by mouth every 4 hours as needed for moderate to severe pain 10 tablet 0    isosorbide mononitrate (IMDUR) 30 MG 24 hr tablet Take 0.5 tablets (15 mg) by mouth daily 45 tablet 3    losartan (COZAAR) 100 MG tablet Take 100 mg by mouth daily       metFORMIN (GLUCOPHAGE-XR) 500 MG 24 hr tablet Take 500 mg by mouth daily (with dinner)      Multiple Vitamin (MULTI-VITAMIN PO)       naproxen sodium (ALEVE) 220 MG capsule Take 220 mg by mouth 2 times daily (with meals)      nitroGLYcerin (NITROSTAT) 0.4 MG sublingual tablet For chest pain place 1 tablet under the tongue every 5 minutes for 3 doses. If symptoms persist 5 minutes after 1st dose call 911. 15 tablet 0    spironolactone (ALDACTONE) 25 MG tablet Take 1  tablet (25 mg) by mouth daily 90 tablet 3    ticagrelor (BRILINTA) 90 MG tablet Take 1 tablet (90 mg) by mouth 2 times daily 180 tablet 3          Past Medical History     Past Medical History:   Diagnosis Date    Arthritis     OA of ankle and hip    Colon polyp     Coronary artery disease involving native coronary artery of native heart without angina pectoris 3/18/2021    coronary angiogram with DESx2 to LAD, residual RPDA disease 3/2021     Dyslipidemia 3/18/2021    ED (erectile dysfunction)     Gastro-oesophageal reflux disease     Hip dislocation, right (H)     Hypertension     Ischemic cardiomyopathy 3/18/2021    OA (osteoarthritis) of ankle     Osteoarthritis of hip     Sleep apnea     no cpap    T2DM (type 2 diabetes mellitus) (H) 3/18/2021    Testicular hypofunction      Past Surgical History:   Procedure Laterality Date    ANKLE SURGERY Right     ARTHRODESIS ANKLE  12/9/2013    Procedure: ARTHRODESIS ANKLE;;  Surgeon: Venkata Mccallum MD;  [facility]    ARTHROPLASTY ANKLE  12/9/2013    Procedure: ARTHROPLASTY ANKLE;  RIGHT TOTAL ANKLE ARTHROPLASTY, SUBTALAR FUSION, LIGAMENT REPAIR (Tornier VALENCIA)^;  Surgeon: Venkata Mccallum MD;  Location: [facility]    ARTHROPLASTY HIP  7/23/2014    Procedure: ARTHROPLASTY HIP;  Surgeon: Cirilo Nassar MD;  Location:  OR    ARTHROPLASTY REVISION HIP Right 11/14/2014    Procedure: ARTHROPLASTY REVISION HIP;  Surgeon: Cirilo Nassar MD;  Location:  OR    ARTHROPLASTY REVISION HIP Right 11/9/2015    Procedure: ARTHROPLASTY REVISION HIP;  Surgeon: José Estrella MD;  Location:  OR    ARTHROPLASTY REVISION HIP Right 2/1/2017    Procedure: ARTHROPLASTY REVISION HIP;  Surgeon: Cirilo Nassar MD;  Location:  OR    CHOLECYSTECTOMY      CHOLECYSTECTOMY  2000    COLONOSCOPY      CV CORONARY ANGIOGRAM N/A 3/9/2021    Procedure: Coronary Angiogram;  Surgeon: Dilip Zamora MD;  Location:  [facility]    CV CORONARY ANGIOGRAM N/A  1/27/2023    Procedure: Coronary Angiogram;  Surgeon: Zeyad Caldwell MD;  Location:  [facility]    CV INSTANTANEOUS WAVE-FREE RATIO N/A 3/9/2021    Procedure: Instantaneous Wave-Free Ratio;  Surgeon: Dilip Zamora MD;  Location:  HE[facility] C[facility] LAB    CV INTRAVASULAR ULTRASOUND N/A 3/9/2021    Procedure: Intravascular Ultrasound;  Surgeon: Dilip Zamora MD;  Location:  [facility] LAB    CV PCI STENT DRUG ELUTING N/A 3/9/2021    Procedure: Percutaneous Coronary Intervention Stent Drug Eluting;  Surgeon: Dilip Zamora MD;  Location:  HEART [facility]ARD[facility] CATH LAB    HC CLOSED TX TRAUMATIC HIP DISLOC W/O ANESTH Right 2/15/2015    Procedure: HIP MANIPULATION / CLOSED REDUCTION;  Surgeon: Cliff Boss MD;  Location: [facility] OR;  Service: Orthopedics    JOINT REPLACEMENT      REMOVE HARDWARE ANKLE Right 3/8/2021    Procedure: RIGHT ANKLE STAPLE REMOVAL;  Surgeon: Venkata Mccallum MD;  Location:  OR    REPAIR LIGAMENT ANKLE  12/9/2013    Procedure: REPAIR LIGAMENT ANKLE;;  Surgeon: Venkata Mccallum MD;  Location: [facility]     Family History   Problem Relation Age of Onset    Heart Failure Mother     Coronary Stenting Mother     Heart Surgery Mother             Allergies   Patient has no known allergies.        Ginger Ozuna NP  [facility]  Pager: [phone]     Thank you for allowing me to participate in the care of your patient.      Sincerely,     Ginger Ozuna NP     [facility]  cc:   America Baxter, APRN CNP  [address]  [place] [address]

## 2023-09-14 NOTE — TELEPHONE ENCOUNTER
I reviewed the need for possible hip surgery and continued dual antiplatelet therapy with Dr. Wu. He was ok with holding brillinta and transitioning to aspirin 81mg daily monotherapy if orthopedics recommends patient undergo surgery prior to January 2024. He should contact us if he is planning to proceed with surgery prior to his next appointment with Dr. Wu.

## 2023-09-30 ENCOUNTER — HEALTH MAINTENANCE LETTER (OUTPATIENT)
Age: 68
End: 2023-09-30

## 2024-01-11 ENCOUNTER — LAB (OUTPATIENT)
Dept: LAB | Facility: CLINIC | Age: 69
End: 2024-01-11
Payer: COMMERCIAL

## 2024-01-11 DIAGNOSIS — I25.110 CORONARY ARTERY DISEASE INVOLVING NATIVE CORONARY ARTERY OF NATIVE HEART WITH UNSTABLE ANGINA PECTORIS (H): ICD-10-CM

## 2024-01-11 LAB
ALT SERPL W P-5'-P-CCNC: 25 U/L (ref 0–70)
CHOLEST SERPL-MCNC: 116 MG/DL
FASTING STATUS PATIENT QL REPORTED: YES
HDLC SERPL-MCNC: 42 MG/DL
LDLC SERPL CALC-MCNC: 45 MG/DL
NONHDLC SERPL-MCNC: 74 MG/DL
TRIGL SERPL-MCNC: 144 MG/DL

## 2024-01-11 PROCEDURE — 36415 COLL VENOUS BLD VENIPUNCTURE: CPT | Performed by: NURSE PRACTITIONER

## 2024-01-11 PROCEDURE — 84460 ALANINE AMINO (ALT) (SGPT): CPT | Performed by: NURSE PRACTITIONER

## 2024-01-11 PROCEDURE — 80061 LIPID PANEL: CPT | Performed by: NURSE PRACTITIONER

## 2024-01-14 NOTE — PROGRESS NOTES
I had the pleasure of seeing Mr. Ramos in follow up at the [facility] today.  He is a very pleasant, 68-year-old gentleman whom I last saw in 01/2023.     I originally saw him in early 2021 after he had undergone removal of a loose staple post right ankle repair and postoperatively started experiencing substernal chest discomfort.  At the time I saw him, an echocardiogram demonstrated severe left ventricular dysfunction with an ejection fraction of 25%-30%.  This appeared to be global in nature, but given these findings, I recommended a coronary angiography, which was performed on 03/09/2021.       He was found to have 2 vessel obstructive coronary artery disease with a diffuse mid to distal LAD stenosis that was revascularized with 2 drug-eluting stents.  He also was noted to have a focal 70% stenosis in the RPDA, which was thought to be appropriate for medical therapy initially.  He was also initiated on appropriate medical therapy for his cardiomyopathy.    At his last office visit he presented with concerns regarding exertional dyspnea and chest discomfort over the past 6 months.  He underwent repeat coronary angiography on 1/27/2023 and successful drug-eluting stent placement to the PDA.  Moderate nonobstructive disease was noted elsewhere.  An echocardiogram prior to his angiogram demonstrated normal left ventricular systolic function.    He was seen in follow-up in September 2023 at which point he did report occasional chest tightness for which she was started on isosorbide mononitrate 15 mg daily.    Today he presents feeling well overall from a cardiovascular standpoint.  After he started the isosorbide he did notice generalized fatigue and weakness for approximately 2 hours after taking this medication but those symptoms have gradually subsided.  He remains very active, working part-time and climbing stairs at home without any limitations.  He denies any palpitations, syncope or  presyncope.  He denies any PND or orthopnea.  He does have chronic left lower extremity edema.    Interestingly, he does mention occasional dyspnea that is not related to exertion.    He also may need orthopedic surgery in the near future.  This has been deferred as of till now due to the need for dual antiplatelet therapy.     PHYSICAL EXAMINATION:  Dictated below.    A lipid panel on 1/11/2024 demonstrated total cholesterol 116, HDL 42, LDL of 45 and triglycerides of 144.       IMPRESSION:      1.  Coronary artery disease, status post drug-eluting stent placement to the LAD as described above in 03/2021.    2.  Residual moderate to severe RPDA stenosis which was revascularized in January 2023.  Currently on aspirin 81 mg daily and Brilinta 90 mg p.o. twice daily.  3.  Presumed ischemic cardiomyopathy with subsequent normalization of left ventricular systolic function on his most recent echocardiogram from September 2023.  4.  Diabetes mellitus.  5.  Hypertension.  6.  Obstructive sleep apnea.    PLAN    Mr. Ramos is doing well overall from a cardiovascular standpoint.  There is no evidence of angina or congestive heart failure.  His medications are appropriate, and the improvement in his left ventricular systolic function is gratifying.    He has completed a year of dual antiplatelet therapy and I have asked him to discontinue his ticagrelor.  This may also be responsible for the episodic dyspnea he experiences since it is not related to exertion.  If this continues, however, a chest x-ray would be appropriate.  I do note that he underwent a CTA of the chest in May 2023 which demonstrated mild basilar groundglass opacities post COVID-19.    If he does proceed to orthopedic surgery, I would prefer he stay on aspirin 81 mg daily during this process.    It was a pleasure seeing him today.  Assuming his clinical status remains stable, I will see him in follow-up in approximately 1 year.      CURRENT  MEDICATIONS:  Current Outpatient Medications   Medication Sig Dispense Refill    amLODIPine (NORVASC) 5 MG tablet Take 5 mg by mouth daily       aspirin (ASA) 81 MG EC tablet Take 1 tablet (81 mg) by mouth daily Start tomorrow. 30 tablet 3    atorvastatin (LIPITOR) 80 MG tablet Take 1 tablet (80 mg) by mouth At Bedtime 90 tablet 3    carvedilol (COREG) 12.5 MG tablet Take 1 tablet (12.5 mg) by mouth 2 times daily (with meals) 180 tablet 4    HYDROcodone-acetaminophen (NORCO) 5-325 MG tablet Take 1-2 tablets by mouth every 4 hours as needed for moderate to severe pain 10 tablet 0    isosorbide mononitrate (IMDUR) 30 MG 24 hr tablet Take 0.5 tablets (15 mg) by mouth daily 45 tablet 3    losartan (COZAAR) 100 MG tablet Take 100 mg by mouth daily       metFORMIN (GLUCOPHAGE-XR) 500 MG 24 hr tablet Take 500 mg by mouth daily (with dinner)      Multiple Vitamin (MULTI-VITAMIN PO)       naproxen sodium (ALEVE) 220 MG capsule Take 220 mg by mouth 2 times daily (with meals)      nitroGLYcerin (NITROSTAT) 0.4 MG sublingual tablet For chest pain place 1 tablet under the tongue every 5 minutes for 3 doses. If symptoms persist 5 minutes after 1st dose call 911. 15 tablet 0    spironolactone (ALDACTONE) 25 MG tablet Take 1 tablet (25 mg) by mouth daily 90 tablet 3    ticagrelor (BRILINTA) 90 MG tablet Take 1 tablet (90 mg) by mouth 2 times daily 180 tablet 3       ALLERGIES   No Known Allergies    PAST MEDICAL HISTORY:  Past Medical History:   Diagnosis Date    Arthritis     OA of ankle and hip    Colon polyp     Coronary artery disease involving native coronary artery of native heart without angina pectoris 3/18/2021    coronary angiogram with DESx2 to LAD, residual RPDA disease 3/2021     Dyslipidemia 3/18/2021    ED (erectile dysfunction)     Gastro-oesophageal reflux disease     Hip dislocation, right (H)     Hypertension     Ischemic cardiomyopathy 3/18/2021    OA (osteoarthritis) of ankle     Osteoarthritis of hip     Sleep  apnea     no cpap    T2DM (type 2 diabetes mellitus) (H) 3/18/2021    Testicular hypofunction        PAST SURGICAL HISTORY:  Past Surgical History:   Procedure Laterality Date    ANKLE SURGERY Right     ARTHRODESIS ANKLE  12/9/2013    Procedure: ARTHRODESIS ANKLE;;  Surgeon: Venkata Mccallum MD;  [facility]    ARTHROPLASTY ANKLE  12/9/2013    Procedure: ARTHROPLASTY ANKLE;  RIGHT TOTAL ANKLE ARTHROPLASTY, SUBTALAR FUSION, LIGAMENT REPAIR (Tornier VALENCIA)^;  Surgeon: Venkata Mccallum MD;  [facility]    ARTHROPLASTY HIP  7/23/2014    Procedure: ARTHROPLASTY HIP;  Surgeon: Cirilo Nassar MD;  Location:  [facility]    ARTHROPLASTY REVISION HIP Right 11/14/2014    Procedure: ARTHROPLASTY REVISION HIP;  Surgeon: Cirilo Nassar MD;  Location:  OR    ARTHROPLASTY REVISION HIP Right 11/9/2015    Procedure: ARTHROPLASTY REVISION HIP;  Surgeon: José Estrella MD;  Location:  OR    ARTHROPLASTY REVISION HIP Right 2/1/2017    Procedure: ARTHROPLASTY REVISION HIP;  Surgeon: Cirilo Nassar MD;  Location:  OR    CHOLECYSTECTOMY      CHOLECYSTECTOMY  2000    COLONOSCOPY      CV CORONARY ANGIOGRAM N/A 3/9/2021    Procedure: Coronary Angiogram;  Surgeon: Dilip Zamora MD;  Location:  [facility]ARDI[facility] C[facility] LAB    CV CORONARY ANGIOGRAM N/A 1/27/2023    Procedure: Coronary Angiogram;  Surgeon: Zeyad Caldwell MD;  Location:  [facility]    CV INSTANTANEOUS WAVE-FREE RATIO N/A 3/9/2021    Procedure: Instantaneous Wave-Free Ratio;  Surgeon: Dilip Zamora MD;  Location:  [facility]    CV INTRAVASULAR ULTRASOUND N/A 3/9/2021    Procedure: Intravascular Ultrasound;  Surgeon: Dilip Zamora MD;  Location:  [facility]    CV PCI STENT DRUG ELUTING N/A 3/9/2021    Procedure: Percutaneous Coronary Intervention Stent Drug Eluting;  Surgeon: Dilip Zamora MD;  Location:  [facility]ARD[facility] CATH LAB    HC CLOSED TX TRAUMATIC HIP  DISLOC W/O ANESTH Right 2/15/2015    Procedure: HIP MANIPULATION / CLOSED REDUCTION;  Surgeon: Cliff Boss MD;  Location: [facility] OR;  Service: Orthopedics    JOINT REPLACEMENT      REMOVE HARDWARE ANKLE Right 3/8/2021    Procedure: RIGHT ANKLE STAPLE REMOVAL;  Surgeon: Venkata Mccallum MD;  Location:  OR    REPAIR LIGAMENT ANKLE  12/9/2013    Procedure: REPAIR LIGAMENT ANKLE;;  Surgeon: Venkata Mccallum MD;  [facility]       FAMILY HISTORY:  Family History   Problem Relation Age of Onset    Heart Failure Mother     Coronary Stenting Mother     Heart Surgery Mother        SOCIAL HISTORY:  Social History     Socioeconomic History    Marital status:    Tobacco Use    Smoking status: Never    Smokeless tobacco: Never   Substance and Sexual Activity    Alcohol use: Yes     Comment: 1 can beer every 2 weeks    Drug use: No       Review of Systems:  Skin:          Eyes:         ENT:         Respiratory:          Cardiovascular:         Gastroenterology:        Genitourinary:         Musculoskeletal:         Neurologic:         Psychiatric:         Heme/Lymph/Imm:         Endocrine:           Physical Exam:  Vitals: There were no vitals taken for this visit.    Constitutional:  cooperative        Skin:  warm and dry to the touch          Head:  normocephalic        Eyes:  pupils equal and round        Lymph:      ENT:  no pallor or cyanosis, dentition good        Neck:  JVP normal        Respiratory:  clear to auscultation         Cardiac: regular rhythm                pulses full and equal                                        GI:  abdomen soft        Extremities and Muscular Skeletal:                 Neurological:           Psych:           CC  Ginger Ozuna, NP  [address] [place] [id number]

## 2024-01-15 ENCOUNTER — OFFICE VISIT (OUTPATIENT)
Dept: CARDIOLOGY | Facility: CLINIC | Age: 69
End: 2024-01-15
Attending: NURSE PRACTITIONER
Payer: COMMERCIAL

## 2024-01-15 VITALS
SYSTOLIC BLOOD PRESSURE: 115 MMHG | DIASTOLIC BLOOD PRESSURE: 74 MMHG | HEIGHT: 74 IN | WEIGHT: 315 LBS | BODY MASS INDEX: 40.43 KG/M2 | OXYGEN SATURATION: 97 % | HEART RATE: 58 BPM

## 2024-01-15 DIAGNOSIS — I25.110 CORONARY ARTERY DISEASE INVOLVING NATIVE CORONARY ARTERY OF NATIVE HEART WITH UNSTABLE ANGINA PECTORIS (H): ICD-10-CM

## 2024-01-15 PROCEDURE — 99214 OFFICE O/P EST MOD 30 MIN: CPT | Performed by: INTERNAL MEDICINE

## 2024-01-15 NOTE — LETTER
1/15/2024    Dilip Ellis MD  [address] [facility] [address]    RE: Paras Ramos       Dear Colleague,     I had the pleasure of seeing Paras Ramos in the [facility].    I had the pleasure of seeing Mr. Ramos in follow up at the [facility] today.  He is a very pleasant, 68-year-old gentleman whom I last saw in 01/2023.     I originally saw him in early 2021 after he had undergone removal of a loose staple post right ankle repair and postoperatively started experiencing substernal chest discomfort.  At the time I saw him, an echocardiogram demonstrated severe left ventricular dysfunction with an ejection fraction of 25%-30%.  This appeared to be global in nature, but given these findings, I recommended a coronary angiography, which was performed on 03/09/2021.       He was found to have 2 vessel obstructive coronary artery disease with a diffuse mid to distal LAD stenosis that was revascularized with 2 drug-eluting stents.  He also was noted to have a focal 70% stenosis in the RPDA, which was thought to be appropriate for medical therapy initially.  He was also initiated on appropriate medical therapy for his cardiomyopathy.    At his last office visit he presented with concerns regarding exertional dyspnea and chest discomfort over the past 6 months.  He underwent repeat coronary angiography on 1/27/2023 and successful drug-eluting stent placement to the PDA.  Moderate nonobstructive disease was noted elsewhere.  An echocardiogram prior to his angiogram demonstrated normal left ventricular systolic function.    He was seen in follow-up in September 2023 at which point he did report occasional chest tightness for which she was started on isosorbide mononitrate 15 mg daily.    Today he presents feeling well overall from a cardiovascular standpoint.  After he started the isosorbide he did notice generalized fatigue and weakness for approximately 2 hours after  taking this medication but those symptoms have gradually subsided.  He remains very active, working part-time and climbing stairs at home without any limitations.  He denies any palpitations, syncope or presyncope.  He denies any PND or orthopnea.  He does have chronic left lower extremity edema.    Interestingly, he does mention occasional dyspnea that is not related to exertion.    He also may need orthopedic surgery in the near future.  This has been deferred as of till now due to the need for dual antiplatelet therapy.     PHYSICAL EXAMINATION:  Dictated below.    A lipid panel on 1/11/2024 demonstrated total cholesterol 116, HDL 42, LDL of 45 and triglycerides of 144.       IMPRESSION:      1.  Coronary artery disease, status post drug-eluting stent placement to the LAD as described above in 03/2021.    2.  Residual moderate to severe RPDA stenosis which was revascularized in January 2023.  Currently on aspirin 81 mg daily and Brilinta 90 mg p.o. twice daily.  3.  Presumed ischemic cardiomyopathy with subsequent normalization of left ventricular systolic function on his most recent echocardiogram from September 2023.  4.  Diabetes mellitus.  5.  Hypertension.  6.  Obstructive sleep apnea.    PLAN    Mr. Ramos is doing well overall from a cardiovascular standpoint.  There is no evidence of angina or congestive heart failure.  His medications are appropriate, and the improvement in his left ventricular systolic function is gratifying.    He has completed a year of dual antiplatelet therapy and I have asked him to discontinue his ticagrelor.  This may also be responsible for the episodic dyspnea he experiences since it is not related to exertion.  If this continues, however, a chest x-ray would be appropriate.  I do note that he underwent a CTA of the chest in May 2023 which demonstrated mild basilar groundglass opacities post COVID-19.    If he does proceed to orthopedic surgery, I would prefer he stay on  aspirin 81 mg daily during this process.    It was a pleasure seeing him today.  Assuming his clinical status remains stable, I will see him in follow-up in approximately 1 year.      CURRENT MEDICATIONS:  Current Outpatient Medications   Medication Sig Dispense Refill    amLODIPine (NORVASC) 5 MG tablet Take 5 mg by mouth daily       aspirin (ASA) 81 MG EC tablet Take 1 tablet (81 mg) by mouth daily Start tomorrow. 30 tablet 3    atorvastatin (LIPITOR) 80 MG tablet Take 1 tablet (80 mg) by mouth At Bedtime 90 tablet 3    carvedilol (COREG) 12.5 MG tablet Take 1 tablet (12.5 mg) by mouth 2 times daily (with meals) 180 tablet 4    HYDROcodone-acetaminophen (NORCO) 5-325 MG tablet Take 1-2 tablets by mouth every 4 hours as needed for moderate to severe pain 10 tablet 0    isosorbide mononitrate (IMDUR) 30 MG 24 hr tablet Take 0.5 tablets (15 mg) by mouth daily 45 tablet 3    losartan (COZAAR) 100 MG tablet Take 100 mg by mouth daily       metFORMIN (GLUCOPHAGE-XR) 500 MG 24 hr tablet Take 500 mg by mouth daily (with dinner)      Multiple Vitamin (MULTI-VITAMIN PO)       naproxen sodium (ALEVE) 220 MG capsule Take 220 mg by mouth 2 times daily (with meals)      nitroGLYcerin (NITROSTAT) 0.4 MG sublingual tablet For chest pain place 1 tablet under the tongue every 5 minutes for 3 doses. If symptoms persist 5 minutes after 1st dose call 911. 15 tablet 0    spironolactone (ALDACTONE) 25 MG tablet Take 1 tablet (25 mg) by mouth daily 90 tablet 3    ticagrelor (BRILINTA) 90 MG tablet Take 1 tablet (90 mg) by mouth 2 times daily 180 tablet 3       ALLERGIES   No Known Allergies    PAST MEDICAL HISTORY:  Past Medical History:   Diagnosis Date    Arthritis     OA of ankle and hip    Colon polyp     Coronary artery disease involving native coronary artery of native heart without angina pectoris 3/18/2021    coronary angiogram with DESx2 to LAD, residual RPDA disease 3/2021     Dyslipidemia 3/18/2021    ED (erectile dysfunction)      Gastro-oesophageal reflux disease     Hip dislocation, right (H)     Hypertension     Ischemic cardiomyopathy 3/18/2021    OA (osteoarthritis) of ankle     Osteoarthritis of hip     Sleep apnea     no cpap    T2DM (type 2 diabetes mellitus) (H) 3/18/2021    Testicular hypofunction        PAST SURGICAL HISTORY:  Past Surgical History:   Procedure Laterality Date    ANKLE SURGERY Right     ARTHRODESIS ANKLE  12/9/2013    Procedure: ARTHRODESIS ANKLE;;  Surgeon: Venkata Mccallmu MD;  [facility]    ARTHROPLASTY ANKLE  12/9/2013    Procedure: ARTHROPLASTY ANKLE;  RIGHT TOTAL ANKLE ARTHROPLASTY, SUBTALAR FUSION, LIGAMENT REPAIR (Tornier VALENCIA)^;  Surgeon: Venkata Mccallum MD;  [facility]    ARTHROPLASTY HIP  7/23/2014    Procedure: ARTHROPLASTY HIP;  Surgeon: Cirilo Nassar MD;  Location:  OR    ARTHROPLASTY REVISION HIP Right 11/14/2014    Procedure: ARTHROPLASTY REVISION HIP;  Surgeon: Cirilo Nassar MD;  Location:  OR    ARTHROPLASTY REVISION HIP Right 11/9/2015    Procedure: ARTHROPLASTY REVISION HIP;  Surgeon: José Estrella MD;  Location:  OR    ARTHROPLASTY REVISION HIP Right 2/1/2017    Procedure: ARTHROPLASTY REVISION HIP;  Surgeon: Cirilo Nassar MD;  Location:  OR    CHOLECYSTECTOMY      CHOLECYSTECTOMY  2000    COLONOSCOPY      CV CORONARY ANGIOGRAM N/A 3/9/2021    Procedure: Coronary Angiogram;  Surgeon: Dilip Zamora MD;  Location:  HE[facility]ARDI[facility] CATH LAB    CV CORONARY ANGIOGRAM N/A 1/27/2023    Procedure: Coronary Angiogram;  Surgeon: Zeyad Caldwell MD;  Location:  [facility] C[facility]    CV INSTANTANEOUS WAVE-FREE RATIO N/A 3/9/2021    Procedure: Instantaneous Wave-Free Ratio;  Surgeon: Dilip Zamora MD;  Location:  HE[facility]ARD[facility] CATH LAB    CV INTRAVASULAR ULTRASOUND N/A 3/9/2021    Procedure: Intravascular Ultrasound;  Surgeon: Dilip Zamora MD;  Location:  [facility]    CV PCI STENT DRUG ELUTING  N/A 3/9/2021    Procedure: Percutaneous Coronary Intervention Stent Drug Eluting;  Surgeon: Dilip Zamora MD;  Location:  [facility]ARDI[facility] CATH LAB    HC CLOSED TX TRAUMATIC HIP DISLOC W/O ANESTH Right 2/15/2015    Procedure: HIP MANIPULATION / CLOSED REDUCTION;  Surgeon: Cliff Boss MD;  Location: [facility] OR;  Service: Orthopedics    JOINT REPLACEMENT      REMOVE HARDWARE ANKLE Right 3/8/2021    Procedure: RIGHT ANKLE STAPLE REMOVAL;  Surgeon: Venkata Mccallum MD;  Location:  OR    REPAIR LIGAMENT ANKLE  12/9/2013    Procedure: REPAIR LIGAMENT ANKLE;;  Surgeon: Venkata Mccallum MD;  Location: [facility]       FAMILY HISTORY:  Family History   Problem Relation Age of Onset    Heart Failure Mother     Coronary Stenting Mother     Heart Surgery Mother        SOCIAL HISTORY:  Social History     Socioeconomic History    Marital status:    Tobacco Use    Smoking status: Never    Smokeless tobacco: Never   Substance and Sexual Activity    Alcohol use: Yes     Comment: 1 can beer every 2 weeks    Drug use: No       Review of Systems:  Skin:          Eyes:         ENT:         Respiratory:          Cardiovascular:         Gastroenterology:        Genitourinary:         Musculoskeletal:         Neurologic:         Psychiatric:         Heme/Lymph/Imm:         Endocrine:           Physical Exam:  Vitals: There were no vitals taken for this visit.    Constitutional:  cooperative        Skin:  warm and dry to the touch          Head:  normocephalic        Eyes:  pupils equal and round        Lymph:      ENT:  no pallor or cyanosis, dentition good        Neck:  JVP normal        Respiratory:  clear to auscultation         Cardiac: regular rhythm                pulses full and equal                                        GI:  abdomen soft        Extremities and Muscular Skeletal:                 Neurological:           Psych:           CC  Ginger Ozuna, NP  [address]  [place]  [id number]      Thank you for allowing me to participate in the care of your patient.      Sincerely,     Shagufta Wu MD     [facility]

## 2024-02-11 ENCOUNTER — HEALTH MAINTENANCE LETTER (OUTPATIENT)
Age: 69
End: 2024-02-11

## 2024-02-16 DIAGNOSIS — I10 BENIGN ESSENTIAL HYPERTENSION: ICD-10-CM

## 2024-02-16 DIAGNOSIS — I25.110 CORONARY ARTERY DISEASE INVOLVING NATIVE CORONARY ARTERY OF NATIVE HEART WITH UNSTABLE ANGINA PECTORIS (H): ICD-10-CM

## 2024-02-16 RX ORDER — SPIRONOLACTONE 25 MG/1
25 TABLET ORAL DAILY
Qty: 90 TABLET | Refills: 4 | Status: SHIPPED | OUTPATIENT
Start: 2024-02-16

## 2024-03-12 DIAGNOSIS — I25.110 CORONARY ARTERY DISEASE INVOLVING NATIVE CORONARY ARTERY OF NATIVE HEART WITH UNSTABLE ANGINA PECTORIS (H): ICD-10-CM

## 2024-03-12 DIAGNOSIS — I10 BENIGN ESSENTIAL HYPERTENSION: ICD-10-CM

## 2024-03-12 RX ORDER — CARVEDILOL 12.5 MG/1
12.5 TABLET ORAL 2 TIMES DAILY WITH MEALS
Qty: 180 TABLET | Refills: 3 | Status: SHIPPED | OUTPATIENT
Start: 2024-03-12

## 2024-06-30 ENCOUNTER — HEALTH MAINTENANCE LETTER (OUTPATIENT)
Age: 69
End: 2024-06-30

## 2024-08-13 ENCOUNTER — MEDICAL CORRESPONDENCE (OUTPATIENT)
Dept: HEALTH INFORMATION MANAGEMENT | Facility: CLINIC | Age: 69
End: 2024-08-13

## 2024-10-21 DIAGNOSIS — I25.110 CORONARY ARTERY DISEASE INVOLVING NATIVE CORONARY ARTERY OF NATIVE HEART WITH UNSTABLE ANGINA PECTORIS (H): ICD-10-CM

## 2024-10-21 RX ORDER — ISOSORBIDE MONONITRATE 30 MG/1
15 TABLET, EXTENDED RELEASE ORAL DAILY
Qty: 45 TABLET | Refills: 1 | Status: SHIPPED | OUTPATIENT
Start: 2024-10-21

## 2024-10-30 NOTE — PROGRESS NOTES
PTA medications updated by Medication Scribe prior to surgery via phone call with patient (last doses completed by Nurse)     Medication history sources: Patient, Surescripts, and H&P  In the past week, patient estimated taking medication this percent of the time: Greater than 90%      Significant changes made to the medication list:  Patient reports no longer taking the following meds (med scribe removed from PTA med list): Norco, multivitamin      Additional medication history information:   None    Medication reconciliation completed by provider prior to medication history? No    Time spent in this activity: 30 minutes    The information provided in this note is only as accurate as the sources available at the time of update(s)      Prior to Admission medications    Medication Sig Last Dose Taking? Auth Provider Long Term End Date   amLODIPine (NORVASC) 5 MG tablet Take 5 mg by mouth daily  Morning Yes Reported, Patient Yes    aspirin (ASA) 81 MG EC tablet Take 1 tablet (81 mg) by mouth daily Start tomorrow. 10/29/2024 Morning Yes Zeyad Caldwell MD No    atorvastatin (LIPITOR) 80 MG tablet Take 1 tablet (80 mg) by mouth At Bedtime Bedtime Yes Shagufta Wu MD Yes    carvedilol (COREG) 12.5 MG tablet Take 1 tablet (12.5 mg) by mouth 2 times daily (with meals) Morning Yes Shagufta Wu MD Yes    isosorbide mononitrate (IMDUR) 30 MG 24 hr tablet Take 0.5 tablets (15 mg) by mouth daily. Morning Yes Shagufta Wu MD Yes    losartan (COZAAR) 100 MG tablet Take 100 mg by mouth daily  Morning Yes Reported, Patient Yes    metFORMIN (GLUCOPHAGE-XR) 500 MG 24 hr tablet Take 500 mg by mouth daily (with dinner) Evening Yes Reported, Patient Yes    naproxen sodium (ALEVE) 220 MG capsule Take 220 mg by mouth 2 times daily (with meals) 10/29/2024 Evening Yes Reported, Patient No    nitroGLYcerin (NITROSTAT) 0.4 MG sublingual tablet For chest pain place 1 tablet under the tongue every 5 minutes for 3  doses. If symptoms persist 5 minutes after 1st dose call 911.  Yes Ginger Ozuna NP Yes    spironolactone (ALDACTONE) 25 MG tablet Take 1 tablet (25 mg) by mouth daily Morning Yes Shagufta Wu MD Yes        Medication history completed by: Tulio Echevarria

## 2024-10-31 NOTE — PLAN OF CARE
"Behavioral Health Integration Screening Questionnaire     Are you aware of the referred from your [facility] Provider  : Yes     Please advise interviewee that they need to answer all questions truthfully to allow for best care, and any misrepresentations of information may affect their ability to be seen at this clinic   => Was this discussed? Yes     If Minor Child (under age 18)    Who is/are the legal guardian(s) of the child?     Is there a custody agreement? No     If \"YES\"- Custody orders must be obtained prior to scheduling the first appointment  In addition, Consent to Treatment must be signed by all legal guardians prior to scheduling the first appointment    If \"NO\"- Consent to Treatment must be signed by all legal guardians prior to scheduling the first appointment    Behavioral Health Outpatient Intake History -     Presenting Problem (in patient's own words): not feeling well and thinks that talking to someone will help    Are there any communication barriers for this patient?     No                                               If yes, please describe barriers:       Are you taking any psychiatric medications? No     If \"YES\" -What are they         If \"YES\" -Who prescribes?     Has the Patient abused alcohol or other substances in the last 6 months ? No  No concerns of substance abuse are reported.     If \"YES\" -What substance, How much, How often?     If illegal substance: Refer to [facility] (for TAY) or SHARE[facility]MAT Offices.   If Alcohol in excess of 10 drinks per week:  Refer to [facility] (for TAY) or SHARE[facility]MAT Offices    ACCEPTED as a patient Yes  If \"Yes\" Appointment Date: 11/18/2024 at 3:00 pm    Referred Elsewhere? No  If “Yes” - (Where? Ex: Therapy Anywhere; [facility];  [facility], etc.)       Name of Insurance Co: [employer]  Insurance ID# [id number]  Insurance Phone # [phone]  If ins is primary or secondary? Primary  If patient is a minor, parents " Problem: Goal Outcome Summary  Goal: Goal Outcome Summary  PT: Pt seen for PM session. Remains IND with bed mobility, transfers, gait with B axillary crutches and stairs with L crutch/R Rail.    Physical Therapy Discharge Summary    Reason for therapy discharge:    Discharged to home.    Progress towards therapy goal(s). See goals on Care Plan in [facility] electronic health record for goal details.  Goals met    Therapy recommendation(s):    Continue home exercise program. Pt has [employer] handout.                information such as Name, D.O.B of guarantor.

## 2024-11-03 ENCOUNTER — ANESTHESIA EVENT (OUTPATIENT)
Dept: SURGERY | Facility: CLINIC | Age: 69
End: 2024-11-03
Payer: COMMERCIAL

## 2024-11-03 NOTE — ANESTHESIA PREPROCEDURE EVALUATION
Anesthesia Pre-Procedure Evaluation    Patient: Paras Ramos   MRN: [id number] : 1955        Procedure : Procedure(s):  LEFT TOTAL ANKLE ARTHROPLASTY          Past Medical History:   Diagnosis Date    Arthritis     OA of ankle and hip    Benign neoplasm of colon     CHF (congestive heart failure) (H)     Colon polyp     Coronary artery disease involving native coronary artery of native heart without angina pectoris 2021    coronary angiogram with DESx2 to LAD, residual RPDA disease 3/2021     Dyslipidemia 2021    ED (erectile dysfunction)     Gastro-oesophageal reflux disease     Hip dislocation, right (H)     History of colonic polyps     Hypertension     Ischemic cardiomyopathy 2021    Morbid obesity (H)     OA (osteoarthritis) of ankle     RAUL (obstructive sleep apnea)     Osteoarthritis of hip     Testicular hypofunction     Type 2 diabetes mellitus (H) 2021      Past Surgical History:   Procedure Laterality Date    ANKLE SURGERY Right     ARTHRODESIS ANKLE  2013    Procedure: ARTHRODESIS ANKLE;;  Surgeon: Venkata Mccallum MD;  [facility]:  [facility]    ARTHROPLASTY ANKLE  2013    Procedure: ARTHROPLASTY ANKLE;  RIGHT TOTAL ANKLE ARTHROPLASTY, SUBTALAR FUSION, LIGAMENT REPAIR (Tornier VALENCIA)^;  Surgeon: Venkata Mccallum MD;  Location:  [facility]    ARTHROPLASTY HIP  2014    Procedure: ARTHROPLASTY HIP;  Surgeon: Cirilo Nassar MD;  Location:  [facility]    ARTHROPLASTY REVISION HIP Right 2014    Procedure: ARTHROPLASTY REVISION HIP;  Surgeon: Cirilo Nassar MD;  Location:  OR    ARTHROPLASTY REVISION HIP Right 2015    Procedure: ARTHROPLASTY REVISION HIP;  Surgeon: José Estrella MD;  Location:  OR    ARTHROPLASTY REVISION HIP Right 2017    Procedure: ARTHROPLASTY REVISION HIP;  Surgeon: Cirilo Nassar MD;  Location:  OR    CHOLECYSTECTOMY      CHOLECYSTECTOMY      COLONOSCOPY      CV CORONARY ANGIOGRAM N/A 3/9/2021    Procedure:  Coronary Angiogram;  Surgeon: Dilip Zamora MD;  Location: [facility] CARDIAC CATH LAB    CV CORONARY ANGIOGRAM N/A 1/27/2023    Procedure: Coronary Angiogram;  Surgeon: Zeyad Caldwell MD;  Location: [facility] CARDIAC CATH LAB    CV INSTANTANEOUS WAVE-FREE RATIO N/A 3/9/2021    Procedure: Instantaneous Wave-Free Ratio;  Surgeon: Dilip Zamora MD;  Location: [facility] CARDIAC CATH LAB    CV INTRAVASULAR ULTRASOUND N/A 3/9/2021    Procedure: Intravascular Ultrasound;  Surgeon: Dilip Zamora MD;  Location: [facility] CARDIAC CATH LAB    CV PCI STENT DRUG ELUTING N/A 3/9/2021    Procedure: Percutaneous Coronary Intervention Stent Drug Eluting;  Surgeon: Dilip Zamora MD;  Location: [facility] CARDIAC CATH LAB    HC CLOSED TX TRAUMATIC HIP DISLOC W/O ANESTH Right 2/15/2015    Procedure: HIP MANIPULATION / CLOSED REDUCTION;  Surgeon: Cliff Boss MD;  Location: [facility] OR;  Service: Orthopedics    JOINT REPLACEMENT      REMOVE HARDWARE ANKLE Right 3/8/2021    Procedure: RIGHT ANKLE STAPLE REMOVAL;  Surgeon: Venkata Mccallum MD;  Location:  [facility]    REPAIR LIGAMENT ANKLE  12/9/2013    Procedure: REPAIR LIGAMENT ANKLE;;  Surgeon: Venkata Mccallum MD;  Location: [facility]      No Known Allergies   Social History     Tobacco Use    Smoking status: Never    Smokeless tobacco: Never   Substance Use Topics    Alcohol use: Yes     Comment: 1 can beer every 2 weeks      Wt Readings from Last 1 Encounters:   01/15/24 149.7 kg (330 lb 1.6 oz)        Anesthesia Evaluation   Pt has had prior anesthetic.     No history of anesthetic complications       ROS/MED HX  ENT/Pulmonary:     (+) sleep apnea (didn't tolerate CPAP), doesn't use CPAP,                                      Neurologic:       Cardiovascular:     (+)  hypertension- -  CAD -  - stent-2021 and 2023.                                      METS/Exercise Tolerance:     Hematologic:     (+)      anemia,           Musculoskeletal:       GI/Hepatic:     (+) GERD, Asymptomatic on medication,                  Renal/Genitourinary:       Endo:     (+)  type II DM (metformin),   Not using insulin,          Obesity (BMI 43),       Psychiatric/Substance Use:       Infectious Disease:       Malignancy:       Other:            Physical Exam    Airway        Mallampati: II   TM distance: > 3 FB   Neck ROM: full   Mouth opening: > 3 cm    Respiratory Devices and Support         Dental       (+) Multiple visibly decayed, broken teeth      Cardiovascular   cardiovascular exam normal          Pulmonary   pulmonary exam normal                OUTSIDE LABS:  CBC:   Lab Results   Component Value Date    WBC 8.3 01/27/2023    WBC 8.5 05/27/2021    HGB 13.9 01/28/2023    HGB 14.1 01/27/2023    HCT 42.9 01/27/2023    HCT 44.5 05/27/2021     01/27/2023     05/27/2021     BMP:   Lab Results   Component Value Date     01/31/2023     01/28/2023    POTASSIUM 4.3 01/31/2023    POTASSIUM 4.7 01/28/2023    CHLORIDE 102 01/31/2023    CHLORIDE 102 01/28/2023    CO2 23 01/31/2023    CO2 25 01/28/2023    BUN 16.3 01/31/2023    BUN 17.1 01/28/2023    CR 0.95 01/31/2023    CR 0.94 01/28/2023     (H) 01/31/2023     (H) 01/28/2023     COAGS:   Lab Results   Component Value Date    PTT 29 01/27/2023    INR 1.02 01/27/2023     POC:   Lab Results   Component Value Date     (H) 03/10/2021     HEPATIC:   Lab Results   Component Value Date    ALBUMIN 3.4 05/27/2021    PROTTOTAL 7.0 05/27/2021    ALT 25 01/11/2024    AST 18 05/27/2021    ALKPHOS 68 05/27/2021    BILITOTAL 0.6 05/27/2021     OTHER:   Lab Results   Component Value Date    A1C 7.0 (H) 03/08/2021    CASE 9.6 01/31/2023    CRP 4.6 08/28/2015    SED 9 08/28/2015       Anesthesia Plan    ASA Status:  3    NPO Status:  NPO Appropriate    Anesthesia Type: General.     - Airway: ETT   Induction: Intravenous.   Maintenance: Balanced.   Techniques and Equipment:     -  Airway: Video-Laryngoscope       Consents    Anesthesia Plan(s) and associated risks, benefits, and realistic alternatives discussed. Questions answered and patient/representative(s) expressed understanding.     - Discussed:     - Discussed with:  Patient            Postoperative Care    Pain management: IV analgesics, Peripheral nerve block (Single Shot), Multi-modal analgesia.   PONV prophylaxis: Ondansetron (or other 5HT-3), Background Propofol Infusion     Comments:    Other Comments: Avoid decadron with DM2           Johnnie Velasco MD    I have reviewed the pertinent notes and labs in the chart from the past 30 days and (re)examined the patient.  Any updates or changes from those notes are reflected in this note.               # Hypertension: Noted on problem list

## 2024-11-04 ENCOUNTER — HOSPITAL ENCOUNTER (OUTPATIENT)
Facility: CLINIC | Age: 69
Discharge: HOME | End: 2024-11-05
Attending: ORTHOPAEDIC SURGERY | Admitting: ORTHOPAEDIC SURGERY
Payer: COMMERCIAL

## 2024-11-04 ENCOUNTER — APPOINTMENT (OUTPATIENT)
Dept: GENERAL RADIOLOGY | Facility: CLINIC | Age: 69
End: 2024-11-04
Attending: ORTHOPAEDIC SURGERY
Payer: COMMERCIAL

## 2024-11-04 ENCOUNTER — ANESTHESIA (OUTPATIENT)
Dept: SURGERY | Facility: CLINIC | Age: 69
End: 2024-11-04
Payer: COMMERCIAL

## 2024-11-04 DIAGNOSIS — Z96.662 STATUS POST ANKLE JOINT REPLACEMENT, LEFT: Primary | ICD-10-CM

## 2024-11-04 LAB — GLUCOSE BLDC GLUCOMTR-MCNC: 122 MG/DL (ref 70–99)

## 2024-11-04 PROCEDURE — 64415 NJX AA&/STRD BRCH PLXS IMG: CPT | Mod: XU,LT | Performed by: ANESTHESIOLOGY

## 2024-11-04 PROCEDURE — 250N000025 HC SEVOFLURANE, PER MIN: Performed by: ORTHOPAEDIC SURGERY

## 2024-11-04 PROCEDURE — 272N000001 HC OR GENERAL SUPPLY STERILE: Performed by: ORTHOPAEDIC SURGERY

## 2024-11-04 PROCEDURE — 258N000003 HC RX IP 258 OP 636: Performed by: ANESTHESIOLOGY

## 2024-11-04 PROCEDURE — 82962 GLUCOSE BLOOD TEST: CPT

## 2024-11-04 PROCEDURE — 250N000011 HC RX IP 250 OP 636: Performed by: ANESTHESIOLOGY

## 2024-11-04 PROCEDURE — 250N000011 HC RX IP 250 OP 636: Performed by: STUDENT IN AN ORGANIZED HEALTH CARE EDUCATION/TRAINING PROGRAM

## 2024-11-04 PROCEDURE — 64447 NJX AA&/STRD FEMORAL NRV IMG: CPT | Mod: XU,LT | Performed by: ANESTHESIOLOGY

## 2024-11-04 PROCEDURE — 250N000011 HC RX IP 250 OP 636: Performed by: NURSE ANESTHETIST, CERTIFIED REGISTERED

## 2024-11-04 PROCEDURE — C1776 JOINT DEVICE (IMPLANTABLE): HCPCS | Performed by: ORTHOPAEDIC SURGERY

## 2024-11-04 PROCEDURE — 27702 RECONSTRUCT ANKLE JOINT: CPT | Performed by: ANESTHESIOLOGY

## 2024-11-04 PROCEDURE — 710N000009 HC RECOVERY PHASE 1, LEVEL 1, PER MIN: Performed by: ORTHOPAEDIC SURGERY

## 2024-11-04 PROCEDURE — 271N000001 HC OR GENERAL SUPPLY NON-STERILE: Performed by: ORTHOPAEDIC SURGERY

## 2024-11-04 PROCEDURE — 250N000009 HC RX 250: Performed by: ORTHOPAEDIC SURGERY

## 2024-11-04 PROCEDURE — 370N000017 HC ANESTHESIA TECHNICAL FEE, PER MIN: Performed by: ORTHOPAEDIC SURGERY

## 2024-11-04 PROCEDURE — 999N000179 XR SURGERY CARM FLUORO LESS THAN 5 MIN W STILLS

## 2024-11-04 PROCEDURE — 27702 RECONSTRUCT ANKLE JOINT: CPT | Performed by: NURSE ANESTHETIST, CERTIFIED REGISTERED

## 2024-11-04 PROCEDURE — 250N000013 HC RX MED GY IP 250 OP 250 PS 637: Performed by: STUDENT IN AN ORGANIZED HEALTH CARE EDUCATION/TRAINING PROGRAM

## 2024-11-04 PROCEDURE — 999N000141 HC STATISTIC PRE-PROCEDURE NURSING ASSESSMENT: Performed by: ORTHOPAEDIC SURGERY

## 2024-11-04 PROCEDURE — 258N000003 HC RX IP 258 OP 636: Performed by: NURSE ANESTHETIST, CERTIFIED REGISTERED

## 2024-11-04 PROCEDURE — 250N000009 HC RX 250: Performed by: NURSE ANESTHETIST, CERTIFIED REGISTERED

## 2024-11-04 PROCEDURE — 360N000077 HC SURGERY LEVEL 4, PER MIN: Performed by: ORTHOPAEDIC SURGERY

## 2024-11-04 DEVICE — IMPLANTABLE DEVICE: Type: IMPLANTABLE DEVICE | Site: ANKLE | Status: FUNCTIONAL

## 2024-11-04 DEVICE — IMP BASEPLATE TIBIAL ANKLE XL SIZE 3 SALTO LJU993T: Type: IMPLANTABLE DEVICE | Site: ANKLE | Status: FUNCTIONAL

## 2024-11-04 RX ORDER — PROCHLORPERAZINE MALEATE 5 MG/1
5 TABLET ORAL EVERY 6 HOURS PRN
Status: DISCONTINUED | OUTPATIENT
Start: 2024-11-04 | End: 2024-11-05 | Stop reason: HOSPADM

## 2024-11-04 RX ORDER — SODIUM CHLORIDE, SODIUM LACTATE, POTASSIUM CHLORIDE, CALCIUM CHLORIDE 600; 310; 30; 20 MG/100ML; MG/100ML; MG/100ML; MG/100ML
INJECTION, SOLUTION INTRAVENOUS CONTINUOUS
Status: DISCONTINUED | OUTPATIENT
Start: 2024-11-04 | End: 2024-11-04 | Stop reason: HOSPADM

## 2024-11-04 RX ORDER — LIDOCAINE HYDROCHLORIDE 20 MG/ML
INJECTION, SOLUTION INFILTRATION; PERINEURAL PRN
Status: DISCONTINUED | OUTPATIENT
Start: 2024-11-04 | End: 2024-11-04

## 2024-11-04 RX ORDER — OXYCODONE HYDROCHLORIDE 5 MG/1
10 TABLET ORAL EVERY 4 HOURS PRN
Status: DISCONTINUED | OUTPATIENT
Start: 2024-11-04 | End: 2024-11-05 | Stop reason: HOSPADM

## 2024-11-04 RX ORDER — HYDROMORPHONE HCL IN WATER/PF 6 MG/30 ML
0.2 PATIENT CONTROLLED ANALGESIA SYRINGE INTRAVENOUS
Status: DISCONTINUED | OUTPATIENT
Start: 2024-11-04 | End: 2024-11-05 | Stop reason: HOSPADM

## 2024-11-04 RX ORDER — CEFAZOLIN SODIUM 2 G/100ML
2 INJECTION, SOLUTION INTRAVENOUS EVERY 8 HOURS
Status: COMPLETED | OUTPATIENT
Start: 2024-11-04 | End: 2024-11-05

## 2024-11-04 RX ORDER — BISACODYL 10 MG
10 SUPPOSITORY, RECTAL RECTAL DAILY PRN
Status: DISCONTINUED | OUTPATIENT
Start: 2024-11-07 | End: 2024-11-05 | Stop reason: HOSPADM

## 2024-11-04 RX ORDER — NALOXONE HYDROCHLORIDE 0.4 MG/ML
0.4 INJECTION, SOLUTION INTRAMUSCULAR; INTRAVENOUS; SUBCUTANEOUS
Status: DISCONTINUED | OUTPATIENT
Start: 2024-11-04 | End: 2024-11-05 | Stop reason: HOSPADM

## 2024-11-04 RX ORDER — ASPIRIN 81 MG/1
81 TABLET ORAL 2 TIMES DAILY
Status: DISCONTINUED | OUTPATIENT
Start: 2024-11-04 | End: 2024-11-05 | Stop reason: HOSPADM

## 2024-11-04 RX ORDER — OXYCODONE HYDROCHLORIDE 5 MG/1
5-10 TABLET ORAL EVERY 4 HOURS PRN
Qty: 26 TABLET | Refills: 0 | Status: SHIPPED | OUTPATIENT
Start: 2024-11-04

## 2024-11-04 RX ORDER — HYDROMORPHONE HCL IN WATER/PF 6 MG/30 ML
0.4 PATIENT CONTROLLED ANALGESIA SYRINGE INTRAVENOUS
Status: DISCONTINUED | OUTPATIENT
Start: 2024-11-04 | End: 2024-11-05 | Stop reason: HOSPADM

## 2024-11-04 RX ORDER — ACETAMINOPHEN 325 MG/1
650 TABLET ORAL EVERY 4 HOURS PRN
Status: DISCONTINUED | OUTPATIENT
Start: 2024-11-07 | End: 2024-11-05 | Stop reason: HOSPADM

## 2024-11-04 RX ORDER — LIDOCAINE 40 MG/G
CREAM TOPICAL
Status: DISCONTINUED | OUTPATIENT
Start: 2024-11-04 | End: 2024-11-04 | Stop reason: HOSPADM

## 2024-11-04 RX ORDER — ONDANSETRON 2 MG/ML
4 INJECTION INTRAMUSCULAR; INTRAVENOUS EVERY 30 MIN PRN
Status: COMPLETED | OUTPATIENT
Start: 2024-11-04 | End: 2024-11-04

## 2024-11-04 RX ORDER — OXYCODONE HYDROCHLORIDE 5 MG/1
5 TABLET ORAL EVERY 4 HOURS PRN
Status: DISCONTINUED | OUTPATIENT
Start: 2024-11-04 | End: 2024-11-05 | Stop reason: HOSPADM

## 2024-11-04 RX ORDER — CEFAZOLIN SODIUM/WATER 3 G/30 ML
3 SYRINGE (ML) INTRAVENOUS
Status: COMPLETED | OUTPATIENT
Start: 2024-11-04 | End: 2024-11-04

## 2024-11-04 RX ORDER — ONDANSETRON 4 MG/1
4 TABLET, ORALLY DISINTEGRATING ORAL EVERY 6 HOURS PRN
Status: DISCONTINUED | OUTPATIENT
Start: 2024-11-04 | End: 2024-11-05 | Stop reason: HOSPADM

## 2024-11-04 RX ORDER — NALOXONE HYDROCHLORIDE 0.4 MG/ML
0.1 INJECTION, SOLUTION INTRAMUSCULAR; INTRAVENOUS; SUBCUTANEOUS
Status: DISCONTINUED | OUTPATIENT
Start: 2024-11-04 | End: 2024-11-04 | Stop reason: HOSPADM

## 2024-11-04 RX ORDER — MAGNESIUM HYDROXIDE 1200 MG/15ML
LIQUID ORAL PRN
Status: DISCONTINUED | OUTPATIENT
Start: 2024-11-04 | End: 2024-11-04 | Stop reason: HOSPADM

## 2024-11-04 RX ORDER — NALOXONE HYDROCHLORIDE 0.4 MG/ML
0.2 INJECTION, SOLUTION INTRAMUSCULAR; INTRAVENOUS; SUBCUTANEOUS
Status: DISCONTINUED | OUTPATIENT
Start: 2024-11-04 | End: 2024-11-05 | Stop reason: HOSPADM

## 2024-11-04 RX ORDER — HYDROXYZINE HYDROCHLORIDE 10 MG/1
10 TABLET, FILM COATED ORAL EVERY 6 HOURS PRN
Qty: 30 TABLET | Refills: 0 | Status: SHIPPED | OUTPATIENT
Start: 2024-11-04

## 2024-11-04 RX ORDER — HYDROXYZINE HYDROCHLORIDE 10 MG/1
10 TABLET, FILM COATED ORAL EVERY 6 HOURS PRN
Status: DISCONTINUED | OUTPATIENT
Start: 2024-11-04 | End: 2024-11-05 | Stop reason: HOSPADM

## 2024-11-04 RX ORDER — METFORMIN HYDROCHLORIDE 500 MG/1
500 TABLET, EXTENDED RELEASE ORAL
COMMUNITY

## 2024-11-04 RX ORDER — METFORMIN HYDROCHLORIDE 500 MG/1
500 TABLET, EXTENDED RELEASE ORAL
Status: DISCONTINUED | OUTPATIENT
Start: 2024-11-04 | End: 2024-11-05 | Stop reason: HOSPADM

## 2024-11-04 RX ORDER — FENTANYL CITRATE 0.05 MG/ML
25 INJECTION, SOLUTION INTRAMUSCULAR; INTRAVENOUS EVERY 5 MIN PRN
Status: DISCONTINUED | OUTPATIENT
Start: 2024-11-04 | End: 2024-11-04 | Stop reason: HOSPADM

## 2024-11-04 RX ORDER — AMOXICILLIN 250 MG
1-2 CAPSULE ORAL 2 TIMES DAILY
Qty: 30 TABLET | Refills: 0 | Status: SHIPPED | OUTPATIENT
Start: 2024-11-04

## 2024-11-04 RX ORDER — FENTANYL CITRATE 0.05 MG/ML
50 INJECTION, SOLUTION INTRAMUSCULAR; INTRAVENOUS
Status: COMPLETED | OUTPATIENT
Start: 2024-11-04 | End: 2024-11-04

## 2024-11-04 RX ORDER — AMOXICILLIN 250 MG
1 CAPSULE ORAL 2 TIMES DAILY
Status: DISCONTINUED | OUTPATIENT
Start: 2024-11-04 | End: 2024-11-05 | Stop reason: HOSPADM

## 2024-11-04 RX ORDER — ACETAMINOPHEN 325 MG/1
650 TABLET ORAL EVERY 4 HOURS PRN
Qty: 100 TABLET | Refills: 0 | Status: SHIPPED | OUTPATIENT
Start: 2024-11-04

## 2024-11-04 RX ORDER — ONDANSETRON 4 MG/1
4 TABLET, ORALLY DISINTEGRATING ORAL EVERY 30 MIN PRN
Status: COMPLETED | OUTPATIENT
Start: 2024-11-04 | End: 2024-11-04

## 2024-11-04 RX ORDER — LIDOCAINE 40 MG/G
CREAM TOPICAL
Status: DISCONTINUED | OUTPATIENT
Start: 2024-11-04 | End: 2024-11-05 | Stop reason: HOSPADM

## 2024-11-04 RX ORDER — CEFAZOLIN SODIUM/WATER 3 G/30 ML
3 SYRINGE (ML) INTRAVENOUS SEE ADMIN INSTRUCTIONS
Status: DISCONTINUED | OUTPATIENT
Start: 2024-11-04 | End: 2024-11-04 | Stop reason: HOSPADM

## 2024-11-04 RX ORDER — AMLODIPINE BESYLATE 5 MG/1
5 TABLET ORAL DAILY
Status: DISCONTINUED | OUTPATIENT
Start: 2024-11-05 | End: 2024-11-05 | Stop reason: HOSPADM

## 2024-11-04 RX ORDER — FENTANYL CITRATE 0.05 MG/ML
50 INJECTION, SOLUTION INTRAMUSCULAR; INTRAVENOUS EVERY 5 MIN PRN
Status: DISCONTINUED | OUTPATIENT
Start: 2024-11-04 | End: 2024-11-04 | Stop reason: HOSPADM

## 2024-11-04 RX ORDER — PROPOFOL 10 MG/ML
INJECTION, EMULSION INTRAVENOUS PRN
Status: DISCONTINUED | OUTPATIENT
Start: 2024-11-04 | End: 2024-11-04

## 2024-11-04 RX ORDER — CARVEDILOL 6.25 MG/1
12.5 TABLET ORAL 2 TIMES DAILY WITH MEALS
Status: DISCONTINUED | OUTPATIENT
Start: 2024-11-04 | End: 2024-11-05 | Stop reason: HOSPADM

## 2024-11-04 RX ORDER — ASPIRIN 81 MG/1
81 TABLET ORAL 2 TIMES DAILY
Qty: 60 TABLET | Refills: 0 | Status: SHIPPED | OUTPATIENT
Start: 2024-11-04

## 2024-11-04 RX ORDER — ATORVASTATIN CALCIUM 80 MG/1
80 TABLET, FILM COATED ORAL AT BEDTIME
Status: DISCONTINUED | OUTPATIENT
Start: 2024-11-04 | End: 2024-11-05 | Stop reason: HOSPADM

## 2024-11-04 RX ORDER — ONDANSETRON 4 MG/1
4 TABLET, ORALLY DISINTEGRATING ORAL EVERY 8 HOURS PRN
Qty: 10 TABLET | Refills: 0 | Status: SHIPPED | OUTPATIENT
Start: 2024-11-04

## 2024-11-04 RX ORDER — LOSARTAN POTASSIUM 100 MG/1
100 TABLET ORAL DAILY
Status: DISCONTINUED | OUTPATIENT
Start: 2024-11-05 | End: 2024-11-05 | Stop reason: HOSPADM

## 2024-11-04 RX ORDER — POLYETHYLENE GLYCOL 3350 17 G/17G
17 POWDER, FOR SOLUTION ORAL DAILY
Status: DISCONTINUED | OUTPATIENT
Start: 2024-11-05 | End: 2024-11-05 | Stop reason: HOSPADM

## 2024-11-04 RX ORDER — DEXAMETHASONE SODIUM PHOSPHATE 4 MG/ML
4 INJECTION, SOLUTION INTRA-ARTICULAR; INTRALESIONAL; INTRAMUSCULAR; INTRAVENOUS; SOFT TISSUE
Status: DISCONTINUED | OUTPATIENT
Start: 2024-11-04 | End: 2024-11-04 | Stop reason: HOSPADM

## 2024-11-04 RX ORDER — ACETAMINOPHEN 325 MG/1
975 TABLET ORAL EVERY 8 HOURS
Status: DISCONTINUED | OUTPATIENT
Start: 2024-11-04 | End: 2024-11-05 | Stop reason: HOSPADM

## 2024-11-04 RX ORDER — SODIUM CHLORIDE, SODIUM LACTATE, POTASSIUM CHLORIDE, CALCIUM CHLORIDE 600; 310; 30; 20 MG/100ML; MG/100ML; MG/100ML; MG/100ML
INJECTION, SOLUTION INTRAVENOUS CONTINUOUS
Status: DISCONTINUED | OUTPATIENT
Start: 2024-11-04 | End: 2024-11-05 | Stop reason: HOSPADM

## 2024-11-04 RX ORDER — HYDROMORPHONE HCL IN WATER/PF 6 MG/30 ML
0.4 PATIENT CONTROLLED ANALGESIA SYRINGE INTRAVENOUS EVERY 5 MIN PRN
Status: DISCONTINUED | OUTPATIENT
Start: 2024-11-04 | End: 2024-11-04 | Stop reason: HOSPADM

## 2024-11-04 RX ORDER — ONDANSETRON 2 MG/ML
4 INJECTION INTRAMUSCULAR; INTRAVENOUS EVERY 6 HOURS PRN
Status: DISCONTINUED | OUTPATIENT
Start: 2024-11-04 | End: 2024-11-05 | Stop reason: HOSPADM

## 2024-11-04 RX ORDER — HYDROMORPHONE HCL IN WATER/PF 6 MG/30 ML
0.2 PATIENT CONTROLLED ANALGESIA SYRINGE INTRAVENOUS EVERY 5 MIN PRN
Status: DISCONTINUED | OUTPATIENT
Start: 2024-11-04 | End: 2024-11-04 | Stop reason: HOSPADM

## 2024-11-04 RX ORDER — SPIRONOLACTONE 25 MG/1
25 TABLET ORAL DAILY
Status: DISCONTINUED | OUTPATIENT
Start: 2024-11-05 | End: 2024-11-05 | Stop reason: HOSPADM

## 2024-11-04 RX ADMIN — CEFAZOLIN SODIUM 2 G: 2 INJECTION, SOLUTION INTRAVENOUS at 17:10

## 2024-11-04 RX ADMIN — Medication 300 MG: at 11:12

## 2024-11-04 RX ADMIN — CARVEDILOL 12.5 MG: 6.25 TABLET, FILM COATED ORAL at 17:10

## 2024-11-04 RX ADMIN — PHENYLEPHRINE HYDROCHLORIDE 100 MCG: 10 INJECTION INTRAVENOUS at 10:56

## 2024-11-04 RX ADMIN — MIDAZOLAM 2 MG: 1 INJECTION INTRAMUSCULAR; INTRAVENOUS at 09:17

## 2024-11-04 RX ADMIN — ONDANSETRON 4 MG: 2 INJECTION INTRAMUSCULAR; INTRAVENOUS at 11:06

## 2024-11-04 RX ADMIN — SENNOSIDES AND DOCUSATE SODIUM 1 TABLET: 50; 8.6 TABLET ORAL at 20:50

## 2024-11-04 RX ADMIN — Medication 3 G: at 10:14

## 2024-11-04 RX ADMIN — ONDANSETRON 4 MG: 2 INJECTION INTRAMUSCULAR; INTRAVENOUS at 12:13

## 2024-11-04 RX ADMIN — PROPOFOL 70 MG: 10 INJECTION, EMULSION INTRAVENOUS at 10:22

## 2024-11-04 RX ADMIN — PROPOFOL 200 MG: 10 INJECTION, EMULSION INTRAVENOUS at 10:20

## 2024-11-04 RX ADMIN — ROCURONIUM BROMIDE 50 MG: 50 INJECTION, SOLUTION INTRAVENOUS at 10:20

## 2024-11-04 RX ADMIN — ACETAMINOPHEN 975 MG: 325 TABLET, FILM COATED ORAL at 21:46

## 2024-11-04 RX ADMIN — METFORMIN ER 500 MG 500 MG: 500 TABLET ORAL at 17:10

## 2024-11-04 RX ADMIN — LIDOCAINE HYDROCHLORIDE 100 MG: 20 INJECTION, SOLUTION INFILTRATION; PERINEURAL at 10:20

## 2024-11-04 RX ADMIN — ASPIRIN 81 MG: 81 TABLET, COATED ORAL at 20:50

## 2024-11-04 RX ADMIN — SODIUM CHLORIDE, POTASSIUM CHLORIDE, SODIUM LACTATE AND CALCIUM CHLORIDE: 600; 310; 30; 20 INJECTION, SOLUTION INTRAVENOUS at 10:16

## 2024-11-04 RX ADMIN — ATORVASTATIN CALCIUM 80 MG: 80 TABLET, FILM COATED ORAL at 21:46

## 2024-11-04 RX ADMIN — FENTANYL CITRATE 50 MCG: 50 INJECTION, SOLUTION INTRAMUSCULAR; INTRAVENOUS at 10:20

## 2024-11-04 RX ADMIN — FENTANYL CITRATE 50 MCG: 50 INJECTION, SOLUTION INTRAMUSCULAR; INTRAVENOUS at 10:27

## 2024-11-04 RX ADMIN — ACETAMINOPHEN 975 MG: 325 TABLET, FILM COATED ORAL at 15:10

## 2024-11-04 ASSESSMENT — ACTIVITIES OF DAILY LIVING (ADL)
ADLS_ACUITY_SCORE: 0

## 2024-11-04 NOTE — OP NOTE
Preop diagnosis:  Severe posttraumatic degenerative changes of the left ankle.  Left Achilles tendon contracture.  Chronic lateral ligament instability.  Degenerative changes of the left talonavicular joint with large dorsal osteophytes.     Postoperative diagnosis:  The same.     Surgical procedure:  Left total ankle replacement using a Yvonne Talaris size 3 long base tibia, size 3 talus and a 8 mm polyethylene spacer.  Percutaneous left Achilles tendon lengthening.  Broström lateral ligament repair.  Open cheilectomy and debridement of the left talonavicular joint.     Anesthetic:  General and  popliteal block.     Surgeon:  Venkata Mccallum MD  Assistant:  Deena Boykin PA-C     Need for an assistant:  A skilled first assistant was needed for the entire procedure to help with positioning, retracting, joint reduction, hardware placement, wound closure and splint application.     Preamble:  He presents with severe degenerative changes of his left ankle.  He also has had an Achilles contracture and a chronic lateral ligament instability.  I did right ankle replacement and lateral ligament repair 10 years ago and it still doing excellent.      Informed consent was obtained for above-mentioned procedure.     Description of procedure:  After adequate induction of a general anesthetic the patient was positioned supine on the operating table.  The left leg was sterilely prepped and free draped in usual fashion.  Tourniquet around the thigh was inflated to 300 mmHg.     A 15 cm midline incision was used anterior over the ankle.  The interval tingling extensor hallucis longus and tibialis anterior was used to expose the joint.  The large osteophytes were removed.  There were also large osteophytes around the talonavicular joint that were removed osteotome.  The external guide was then used to     make the distal tibial cut 9 mm from the articular surface.  The bone was removed.  T a central talar pin was placed and the  chamfer cuts were done for the talus.  With all the cuts made the ankle measured to a size 3 long base tibia, size 3 talus and the risk good tension with the 8 mm polyethylene spacer.    There was however still an equinus contracture as well as a lateral ligament instability.    A percutaneous Achilles tendon lengthening was done using a [place] technique.  This is followed by a lateral incision over the ankle.  The lateral ligament complex was exposed.  The Broström repair was done with shortening and imbricating the anterior talofibular and calcaneofibular ligaments onto the fibula with 1 Vicryl suture.  This gave excellent stability of the ankle.    The trial components were removed.  The wound was thoroughly rinsed.  The definitive components were impacted in place again with excellent stability and range of motion.      The tourniquet was deflated.  Hemostasis obtained.  The wounds closed in layers.  A sterile dressing, light compressive bandage and short leg cast applied.  She tolerated the procedure well and was taken to the recovery room in satisfactory condition     He can ambulate weightbearing partial weightbearing with crutches.  Sutures will be removed in 2 week

## 2024-11-04 NOTE — ANESTHESIA PROCEDURE NOTES
Sciatic Procedure Note    Pre-Procedure   Staff -        Anesthesiologist:  Johnnie Velasco MD       Performed By: Anesthesiologist       Location: pre-op       Pre-Anesthestic Checklist: patient identified, IV checked, risks and benefits discussed, informed consent, pre-op evaluation, at physician/surgeon's request and post-op pain management  Timeout:       Correct Patient: Yes        Correct Procedure: Yes        Correct Site: Yes        Correct Position: Yes        Correct Laterality: Yes        Site Marked: Yes  Procedure Documentation  Procedure: Sciatic       Laterality: left       Patient Position: supine       Skin prep: Chloraprep       Local skin infiltrated with mL of 1% lidocaine.  (popliteal approach).       Needle Type: short bevel       Needle Gauge: 20.        Needle Length (Inches): 3.13        Ultrasound guided       1. Ultrasound was used to identify targeted nerve, plexus, vascular marker, or fascial plane and place a needle adjacent to it in real-time.       2. Ultrasound was used to visualize the spread of anesthetic in close proximity to the above referenced structure.       3. A permanent image is entered into the patient's record.    Assessment/Narrative         The placement was negative for: blood aspirated, painful injection and site bleeding       Paresthesias: No.       Bolus given via needle..        Secured via.        Insertion/Infusion Method: Single Shot       Complications: none       Injection made incrementally with aspirations every 5 mL.    Medication(s) Administered   Bupivacaine 0.5% w/ 1:400K Epi (Injection) - Injection   29 mL - 11/4/2024 9:22:00 AM   Comments:  Ultrasound Interpretation, peripheral nerve block    1.  Under ultrasound guidance, needle was inserted and placed in close proximity to the nerve.  2. Ultrasound was also used to visualize the spread of the anesthetic in close proximity to the nerve being blocked.  3. The nerve appeared anatomically  "normal.  4. There were no apparent abnormal pathological findings.  5. A permanent ultrasound image was saved n the patient's record.    Johnnie Velasco MD   9:22 AM      FOR [facility] ([facility]) ONLY:   Pain Team Contact information: please page the Pain Team Via [employer]. Search \"Pain\". During daytime hours, please page the attending first. At night please page the resident first.      "

## 2024-11-04 NOTE — ANESTHESIA PROCEDURE NOTES
Adductor canal Procedure Note    Pre-Procedure   Staff -        Anesthesiologist:  Johnnie Velasco MD       Performed By: Anesthesiologist       Location: pre-op       Pre-Anesthestic Checklist: patient identified, IV checked, risks and benefits discussed, informed consent, pre-op evaluation, at physician/surgeon's request and post-op pain management  Timeout:       Correct Patient: Yes        Correct Procedure: Yes        Correct Site: Yes        Correct Position: Yes        Correct Laterality: Yes        Site Marked: Yes  Procedure Documentation  Procedure: Adductor canal       Laterality: left       Patient Position: supine       Skin prep: Chloraprep       Local skin infiltrated with mL of 1% lidocaine.        Needle Type: short bevel       Needle Gauge: 20.        Needle Length (Inches): 3.13        Ultrasound guided       1. Ultrasound was used to identify targeted nerve, plexus, vascular marker, or fascial plane and place a needle adjacent to it in real-time.       2. Ultrasound was used to visualize the spread of anesthetic in close proximity to the above referenced structure.       3. A permanent image is entered into the patient's record.    Assessment/Narrative         The placement was negative for: blood aspirated, painful injection and site bleeding       Paresthesias: No.       Bolus given via needle..        Secured via.        Insertion/Infusion Method: Single Shot       Complications: none       Injection made incrementally with aspirations every 5 mL.    Medication(s) Administered   Bupivacaine 0.5% w/ 1:400K Epi (Injection) - Injection   11 mL - 11/4/2024 9:22:00 AM   Comments:  Ultrasound Interpretation, peripheral nerve block    1.  Under ultrasound guidance, needle was inserted and placed in close proximity to the nerve.  2. Ultrasound was also used to visualize the spread of the anesthetic in close proximity to the nerve being blocked.  3. The nerve appeared anatomically normal.  4.  "There were no apparent abnormal pathological findings.  5. A permanent ultrasound image was saved n the patient's record.    Johnnie Velasco MD   9:22 AM      FOR [facility] ([facility]) ONLY:   Pain Team Contact information: please page the Pain Team Via [employer]. Search \"Pain\". During daytime hours, please page the attending first. At night please page the resident first.      "

## 2024-11-04 NOTE — ANESTHESIA PROCEDURE NOTES
Airway       Patient location during procedure: OR       Procedure Start/Stop Times: 11/4/2024 10:23 AM  Staff -        Anesthesiologist:  Johnnie Velasco MD       CRNA: Tiffani Mallory APRN CRNA       Performed By: CRNA  Consent for Airway        Urgency: elective  Indications and Patient Condition       Indications for airway management: jakob-procedural       Induction type:intravenous       Mask difficulty assessment: 2 - vent by mask + OA or adjuvant +/- NMBA    Final Airway Details       Final airway type: endotracheal airway       Successful airway: ETT - single  Endotracheal Airway Details        ETT size (mm): 8.0       Cuffed: yes       Successful intubation technique: video laryngoscopy       VL Blade Size: Glidescope 4       Grade View of Cords: 1       Adjucts: stylet       Position: Right       Measured from: gums/teeth       Secured at (cm): 22       Bite block used: None    Post intubation assessment        Placement verified by: capnometry and chest rise        Number of attempts at approach: 1       Secured with: tape       Ease of procedure: easy       Dentition: Intact and Unchanged (Very poor dentition at baseline.)    Medication(s) Administered   Medication Administration Time: 11/4/2024 10:23 AM

## 2024-11-04 NOTE — ANESTHESIA POSTPROCEDURE EVALUATION
Patient: Paras Ramos    Procedure: Procedure(s):  LEFT TOTAL ANKLE ARTHROPLASTY with achilles lengthening and brostrom       Anesthesia Type:  General    Note:     Postop Pain Control: Uneventful            Sign Out: Well controlled pain   PONV: No   Neuro/Psych: Uneventful            Sign Out: Acceptable/Baseline neuro status   Airway/Respiratory: Uneventful            Sign Out: Acceptable/Baseline resp. status   CV/Hemodynamics: Uneventful            Sign Out: Acceptable CV status; No obvious hypovolemia; No obvious fluid overload   Other NRE: NONE   DID A NON-ROUTINE EVENT OCCUR? No           Last vitals:  Vitals Value Taken Time   /74 11/04/24 1215   Temp 36.8  C (98.2  F) 11/04/24 1215   Pulse 63 11/04/24 1225   Resp 27 11/04/24 1225   SpO2 92 % 11/04/24 1225   Vitals shown include unfiled device data.    Electronically Signed By: Johnnie Velasco MD  November 4, 2024  12:26 PM

## 2024-11-04 NOTE — PLAN OF CARE
Goal Outcome Evaluation:      Plan of Care Reviewed With: patient      Pt alert and oriented X4. Denied SOB, CP, N/V. Cap refill in LLE <3 seconds, toe warm, and color WNL. Pt still endorses numbness. Pt sat at EOB for dinner, stood with GB walker for 2 minutes. Partial WB, denied light headed/dizzyness. Drainage to knee dressing. Ice applied. Pt has feeling at site. Urinating WNL. Passing gas.     Patient vital signs are at baseline: Yes  Patient able to ambulate as they were prior to admission or with assist devices provided by therapies during their stay:  No,  Reason:  needs therapy  Patient MUST void prior to discharge:  Yes  Patient able to tolerate oral intake:  Yes  Pain has adequate pain control using Oral analgesics:  Yes, has not needed any.   Does patient have an identified :  Yes  Has goal D/C date and time been discussed with patient:  Yes

## 2024-11-04 NOTE — ANESTHESIA CARE TRANSFER NOTE
Patient: Paras Ramos    Procedure: Procedure(s):  LEFT TOTAL ANKLE ARTHROPLASTY with achilles lengthening and brostrom       Diagnosis: Degenerative joint disease of ankle, left [M19.072]  Diagnosis Additional Information: No value filed.    Anesthesia Type:   General     Note:    Oropharynx: oropharynx clear of all foreign objects and spontaneously breathing  Level of Consciousness: drowsy  Oxygen Supplementation: face mask  Level of Supplemental Oxygen (L/min / FiO2): 10  Independent Airway: airway patency satisfactory and stable  Dentition: dentition unchanged  Vital Signs Stable: post-procedure vital signs reviewed and stable  Report to RN Given: handoff report given  Patient transferred to: PACU  Comments: Neuromuscular blockade reversed with sugammadex, spontaneous respirations, adequate tidal volumes, followed commands to voice, extubated atraumatically, extubated with suction, airway patent after extubation.  Oxygen via facemask at 10 liters per minute to PACU. Oxygen tubing connected to wall O2 in PACU, SpO2, NiBP, and EKG monitors and alarms on and functioning, Ashwini Hugger warmer connected to patient gown, report on patient's clinical status given to PACU RN, RN questions answered.         Handoff Report: Identifed the Patient, Identified the Reponsible Provider, Reviewed the pertinent medical history, Discussed the surgical course, Reviewed Intra-OP anesthesia mangement and issues during anesthesia, Set expectations for post-procedure period and Allowed opportunity for questions and acknowledgement of understanding  Vitals:  Vitals Value Taken Time   /98 11/04/24 1124   Temp     Pulse 67 11/04/24 1129   Resp 25 11/04/24 1129   SpO2 99 % 11/04/24 1129   Vitals shown include unfiled device data.    Electronically Signed By: BRODY Weems CRNA  November 4, 2024  11:29 AM

## 2024-11-05 ENCOUNTER — APPOINTMENT (OUTPATIENT)
Dept: PHYSICAL THERAPY | Facility: CLINIC | Age: 69
End: 2024-11-05
Attending: ORTHOPAEDIC SURGERY
Payer: COMMERCIAL

## 2024-11-05 VITALS
HEIGHT: 74 IN | RESPIRATION RATE: 16 BRPM | WEIGHT: 315 LBS | TEMPERATURE: 98.2 F | OXYGEN SATURATION: 94 % | DIASTOLIC BLOOD PRESSURE: 71 MMHG | SYSTOLIC BLOOD PRESSURE: 101 MMHG | BODY MASS INDEX: 40.43 KG/M2 | HEART RATE: 67 BPM

## 2024-11-05 LAB
GLUCOSE BLDC GLUCOMTR-MCNC: 159 MG/DL (ref 70–99)
HGB BLD-MCNC: 14.1 G/DL (ref 13.3–17.7)

## 2024-11-05 PROCEDURE — 85018 HEMOGLOBIN: CPT | Performed by: STUDENT IN AN ORGANIZED HEALTH CARE EDUCATION/TRAINING PROGRAM

## 2024-11-05 PROCEDURE — 97116 GAIT TRAINING THERAPY: CPT | Mod: GP | Performed by: PHYSICAL THERAPIST

## 2024-11-05 PROCEDURE — 250N000011 HC RX IP 250 OP 636: Mod: JZ | Performed by: STUDENT IN AN ORGANIZED HEALTH CARE EDUCATION/TRAINING PROGRAM

## 2024-11-05 PROCEDURE — 36415 COLL VENOUS BLD VENIPUNCTURE: CPT | Performed by: STUDENT IN AN ORGANIZED HEALTH CARE EDUCATION/TRAINING PROGRAM

## 2024-11-05 PROCEDURE — 97530 THERAPEUTIC ACTIVITIES: CPT | Mod: GP | Performed by: PHYSICAL THERAPIST

## 2024-11-05 PROCEDURE — 250N000013 HC RX MED GY IP 250 OP 250 PS 637: Performed by: STUDENT IN AN ORGANIZED HEALTH CARE EDUCATION/TRAINING PROGRAM

## 2024-11-05 PROCEDURE — 82962 GLUCOSE BLOOD TEST: CPT

## 2024-11-05 PROCEDURE — 97161 PT EVAL LOW COMPLEX 20 MIN: CPT | Mod: GP | Performed by: PHYSICAL THERAPIST

## 2024-11-05 RX ADMIN — CARVEDILOL 12.5 MG: 6.25 TABLET, FILM COATED ORAL at 08:14

## 2024-11-05 RX ADMIN — HYDROXYZINE HYDROCHLORIDE 10 MG: 10 TABLET ORAL at 08:15

## 2024-11-05 RX ADMIN — AMLODIPINE BESYLATE 5 MG: 5 TABLET ORAL at 08:14

## 2024-11-05 RX ADMIN — ACETAMINOPHEN 975 MG: 325 TABLET, FILM COATED ORAL at 06:47

## 2024-11-05 RX ADMIN — ASPIRIN 81 MG: 81 TABLET, COATED ORAL at 08:14

## 2024-11-05 RX ADMIN — ISOSORBIDE MONONITRATE 15 MG: 30 TABLET, EXTENDED RELEASE ORAL at 08:16

## 2024-11-05 RX ADMIN — SPIRONOLACTONE 25 MG: 25 TABLET ORAL at 08:15

## 2024-11-05 RX ADMIN — OXYCODONE HYDROCHLORIDE 5 MG: 5 TABLET ORAL at 08:15

## 2024-11-05 RX ADMIN — OXYCODONE HYDROCHLORIDE 5 MG: 5 TABLET ORAL at 14:50

## 2024-11-05 RX ADMIN — ACETAMINOPHEN 975 MG: 325 TABLET, FILM COATED ORAL at 14:49

## 2024-11-05 RX ADMIN — SENNOSIDES AND DOCUSATE SODIUM 1 TABLET: 50; 8.6 TABLET ORAL at 08:14

## 2024-11-05 RX ADMIN — CEFAZOLIN SODIUM 2 G: 2 INJECTION, SOLUTION INTRAVENOUS at 03:01

## 2024-11-05 RX ADMIN — LOSARTAN POTASSIUM 100 MG: 100 TABLET, FILM COATED ORAL at 08:14

## 2024-11-05 ASSESSMENT — ACTIVITIES OF DAILY LIVING (ADL)
ADLS_ACUITY_SCORE: 0

## 2024-11-05 ASSESSMENT — COLUMBIA-SUICIDE SEVERITY RATING SCALE - C-SSRS
2. HAVE YOU ACTUALLY HAD ANY THOUGHTS OF KILLING YOURSELF IN THE PAST MONTH?: NO
1. IN THE PAST MONTH, HAVE YOU WISHED YOU WERE DEAD OR WISHED YOU COULD GO TO SLEEP AND NOT WAKE UP?: NO
6. HAVE YOU EVER DONE ANYTHING, STARTED TO DO ANYTHING, OR PREPARED TO DO ANYTHING TO END YOUR LIFE?: NO

## 2024-11-05 NOTE — PLAN OF CARE
Discharge to home. Will follow up with dr Mccallum as scheduled. Discharge education/medications complete. Transport arranged with spouse.

## 2024-11-05 NOTE — PLAN OF CARE
Goal Outcome Evaluation:    Patient vital signs are at baseline: Yes  Patient able to ambulate as they were prior to admission or with assist devices provided by therapies during their stay:  No,  Reason:  not OOB this shift.   Patient MUST void prior to discharge:  Yes  Patient able to tolerate oral intake:  Yes  Pain has adequate pain control using Oral analgesics:  Yes  Does patient have an identified :  Yes  Has goal D/C date and time been discussed with patient:  Yes    Pt. A&Ox4. Not OOB, up x2 GBW Partial weight bearing. Regular diet. Dressing CDI. Continues to have numbness in LLE toes.

## 2024-11-05 NOTE — PROGRESS NOTES
Progress note:     Patient is s/p left total ankle, POD #1. They are doing well overnight. Pain has been well managed.     PE: Alert and oriented x3. No acute distress. Splint intact with no breakthrough drainage. Capillary refill <3 seconds. No popliteal tenderness or pain.     Plan: Work with PT this morning on 50% weight bear status. Pending success with WB status and pain continued to be managed with oral medications, will discharge home this afternoon.
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
## 2024-11-17 ENCOUNTER — HEALTH MAINTENANCE LETTER (OUTPATIENT)
Age: 69
End: 2024-11-17

## 2025-03-08 ENCOUNTER — HEALTH MAINTENANCE LETTER (OUTPATIENT)
Age: 70
End: 2025-03-08

## 2025-03-10 ENCOUNTER — HOSPITAL ENCOUNTER (OUTPATIENT)
Dept: CARDIOLOGY | Facility: CLINIC | Age: 70
Discharge: HOME | End: 2025-03-10
Attending: INTERNAL MEDICINE | Admitting: INTERNAL MEDICINE
Payer: COMMERCIAL

## 2025-03-10 DIAGNOSIS — I25.110 CORONARY ARTERY DISEASE INVOLVING NATIVE CORONARY ARTERY OF NATIVE HEART WITH UNSTABLE ANGINA PECTORIS (H): ICD-10-CM

## 2025-03-10 PROBLEM — I50.22 CHRONIC SYSTOLIC CONGESTIVE HEART FAILURE (H): Status: ACTIVE | Noted: 2021-05-03

## 2025-03-10 LAB — LVEF ECHO: NORMAL

## 2025-03-10 PROCEDURE — 93306 TTE W/DOPPLER COMPLETE: CPT | Mod: 26 | Performed by: INTERNAL MEDICINE

## 2025-03-10 PROCEDURE — 255N000002 HC RX 255 OP 636: Performed by: INTERNAL MEDICINE

## 2025-03-10 PROCEDURE — C8929 TTE W OR WO FOL WCON,DOPPLER: HCPCS

## 2025-03-10 PROCEDURE — 999N000208 ECHOCARDIOGRAM COMPLETE

## 2025-03-10 RX ADMIN — PERFLUTREN 10 ML: 6.52 INJECTION, SUSPENSION INTRAVENOUS at 09:30

## 2025-03-17 DIAGNOSIS — I10 BENIGN ESSENTIAL HYPERTENSION: ICD-10-CM

## 2025-03-17 DIAGNOSIS — I25.110 CORONARY ARTERY DISEASE INVOLVING NATIVE CORONARY ARTERY OF NATIVE HEART WITH UNSTABLE ANGINA PECTORIS (H): ICD-10-CM

## 2025-03-17 RX ORDER — CARVEDILOL 12.5 MG/1
12.5 TABLET ORAL 2 TIMES DAILY WITH MEALS
Qty: 180 TABLET | Refills: 0 | Status: SHIPPED | OUTPATIENT
Start: 2025-03-17

## 2025-03-23 NOTE — PROGRESS NOTES
HPI and Plan:       I had the pleasure of seeing Mr. Ramos in follow up at the [facility] today.  He is a very pleasant, 69-year-old gentleman whom I last saw in 01/2024     I originally saw him in early 2021 after he had undergone removal of a loose staple post right ankle repair and postoperatively started experiencing substernal chest discomfort.  At the time I saw him, an echocardiogram demonstrated severe left ventricular dysfunction with an ejection fraction of 25%-30%.  This appeared to be global in nature, but given these findings, I recommended a coronary angiography, which was performed on 03/09/2021.       He was found to have 2 vessel obstructive coronary artery disease with a diffuse mid to distal LAD stenosis that was revascularized with 2 drug-eluting stents.  He also was noted to have a focal 70% stenosis in the RPDA, which was thought to be appropriate for medical therapy initially.  He was also initiated on appropriate medical therapy for his cardiomyopathy.    Due to anginal symptoms he underwent drug-eluting stent placement to the PDA in January 2023.  Moderate nonobstructive disease was noted elsewhere.  An echocardiogram prior to his angiogram demonstrated normal left ventricular systolic function.    At his last office visit he was experiencing episodic dyspnea and his ticagrelor was discontinued.  He continues to experience exertional dyspnea, however, some of which may be due to deconditioning given that he underwent left foot surgery and carpal tunnel surgery earlier this year.    Interestingly, he has also developed potential carpal tunnel involving the right wrist after having undergone left carpal tunnel release earlier.  He is wondering if this may be a marker of underlying cardiac amyloidosis.    He denies any chest discomfort, syncope or presyncope.  She denies any PND but does have chronic lower extremity edema.  He remains compliant with his medications.      PHYSICAL EXAMINATION:  Dictated below.    A lipid panel on 1/19/2024 demonstrated total cholesterol 117, triglycerides 176, HDL 39 and LDL of 43.    A1c on 10/21/2024 was 6.2.    An echocardiogram on 3/10/2025 demonstrated low normal left ventricular systolic function with no significant valvular disease.  Left ventricular wall thickness was within normal limits.  This was personally reviewed and independently interpreted in the presence of the patient.    We also reviewed his cardiovascular MRI from 2021 which demonstrated late enhancement involving the lateral wall consistent with a previous myocardial infarction but no diffuse uptake that would be suggestive of an infiltrative cardiac process.     IMPRESSION:      1.  Coronary artery disease, status post drug-eluting stent placement to the LAD as described above in 03/2021.    2.  Residual moderate to severe RPDA stenosis which was revascularized in January 2023.  Currently on aspirin 81 mg daily.  3.  Presumed ischemic cardiomyopathy with subsequent normalization of left ventricular systolic function.  4.  Diabetes mellitus.  5.  Hypertension.  6.  Obstructive sleep apnea.    PLAN    Mr. Ramos is doing well overall from a cardiac standpoint.  Given the recent development of bilateral carpal tunnel syndrome the question of an infiltrative cardiac process is a reasonable concern, particularly given his ongoing dyspnea on exertion.    I think that it would be reasonable to proceed with a Lexiscan stress cardiovascular MRI for further evaluation in this regard.  I will also order an N-terminal proBNP and troponin for further evaluation.  Depending on the results, PYP imaging may be considered.    It was a pleasure seeing him today.    The longitudinal plan of care for the diagnosis(es)/condition(s) as documented were addressed during this visit. Due to the added complexity in care, I will continue to support Paras in the subsequent management and with ongoing  continuity of care.      CURRENT MEDICATIONS:  Current Outpatient Medications   Medication Sig Dispense Refill    acetaminophen (TYLENOL) 325 MG tablet Take 2 tablets (650 mg) by mouth every 4 hours as needed for other (mild pain). 100 tablet 0    amLODIPine (NORVASC) 5 MG tablet Take 5 mg by mouth daily       aspirin 81 MG EC tablet Take 1 tablet (81 mg) by mouth 2 times daily. 60 tablet 0    atorvastatin (LIPITOR) 80 MG tablet Take 1 tablet (80 mg) by mouth At Bedtime 90 tablet 3    carvedilol (COREG) 12.5 MG tablet Take 1 tablet (12.5 mg) by mouth 2 times daily (with meals). 180 tablet 0    hydrOXYzine HCl (ATARAX) 10 MG tablet Take 1 tablet (10 mg) by mouth every 6 hours as needed for itching or anxiety (with pain, moderate pain). 30 tablet 0    isosorbide mononitrate (IMDUR) 30 MG 24 hr tablet Take 0.5 tablets (15 mg) by mouth daily. 45 tablet 1    losartan (COZAAR) 100 MG tablet Take 100 mg by mouth daily       metFORMIN (GLUCOPHAGE XR) 500 MG 24 hr tablet Take 500 mg by mouth daily (with dinner).      nitroGLYcerin (NITROSTAT) 0.4 MG sublingual tablet For chest pain place 1 tablet under the tongue every 5 minutes for 3 doses. If symptoms persist 5 minutes after 1st dose call 911. 15 tablet 0    ondansetron (ZOFRAN ODT) 4 MG ODT tab Take 1 tablet (4 mg) by mouth every 8 hours as needed for nausea. Dissolve ON the tongue. 10 tablet 0    oxyCODONE (ROXICODONE) 5 MG tablet Take 1-2 tablets (5-10 mg) by mouth every 4 hours as needed for moderate to severe pain. 26 tablet 0    senna-docusate (SENOKOT-S/PERICOLACE) 8.6-50 MG tablet Take 1-2 tablets by mouth 2 times daily. Take while on oral narcotics to prevent or treat constipation. 30 tablet 0    spironolactone (ALDACTONE) 25 MG tablet Take 1 tablet (25 mg) by mouth daily 90 tablet 4       ALLERGIES   No Known Allergies    PAST MEDICAL HISTORY:  Past Medical History:   Diagnosis Date    Arthritis     OA of ankle and hip    Benign neoplasm of colon     CHF  (congestive heart failure) (H)     Colon polyp     Coronary artery disease involving native coronary artery of native heart without angina pectoris 03/18/2021    coronary angiogram with DESx2 to LAD, residual RPDA disease 3/2021     Dyslipidemia 03/18/2021    ED (erectile dysfunction)     Gastro-oesophageal reflux disease     Hip dislocation, right (H)     History of colonic polyps     Hypertension     Ischemic cardiomyopathy 03/18/2021    Morbid obesity (H)     OA (osteoarthritis) of ankle     RAUL (obstructive sleep apnea)     Osteoarthritis of hip     Testicular hypofunction     Type 2 diabetes mellitus (H) 03/18/2021       PAST SURGICAL HISTORY:  Past Surgical History:   Procedure Laterality Date    ANKLE SURGERY Right     ARTHRODESIS ANKLE  12/9/2013    Procedure: ARTHRODESIS ANKLE;;  Surgeon: Venkata Mccallum MD;  Location: [facility]    ARTHROPLASTY ANKLE  12/9/2013    Procedure: ARTHROPLASTY ANKLE;  RIGHT TOTAL ANKLE ARTHROPLASTY, SUBTALAR FUSION, LIGAMENT REPAIR (Tornier VALENCIA)^;  Surgeon: Venkata Mccallum MD;  Location:  [facility]    ARTHROPLASTY ANKLE Left 11/4/2024    Procedure: LEFT TOTAL ANKLE ARTHROPLASTY with achilles lengthening and brostrom;  Surgeon: Venkata Mccallum MD;  Location:  OR    ARTHROPLASTY HIP  7/23/2014    Procedure: ARTHROPLASTY HIP;  Surgeon: Cirilo Nassar MD;  Location:  OR    ARTHROPLASTY REVISION HIP Right 11/14/2014    Procedure: ARTHROPLASTY REVISION HIP;  Surgeon: Cirilo Nassar MD;  Location:  OR    ARTHROPLASTY REVISION HIP Right 11/9/2015    Procedure: ARTHROPLASTY REVISION HIP;  Surgeon: José Estrella MD;  Location:  OR    ARTHROPLASTY REVISION HIP Right 2/1/2017    Procedure: ARTHROPLASTY REVISION HIP;  Surgeon: Cirilo Nassar MD;  Location:  OR    CHOLECYSTECTOMY      CHOLECYSTECTOMY  2000    COLONOSCOPY      CV CORONARY ANGIOGRAM N/A 3/9/2021    Procedure: Coronary Angiogram;  Surgeon: Dilip Zamora MD;  Location:  HEART  CARDIAC CATH LAB    CV CORONARY ANGIOGRAM N/A 1/27/2023    Procedure: Coronary Angiogram;  Surgeon: Zeyad Caldwell MD;  Location: [facility] CARDIAC CATH LAB    CV INSTANTANEOUS WAVE-FREE RATIO N/A 3/9/2021    Procedure: Instantaneous Wave-Free Ratio;  Surgeon: Dilip Zamora MD;  Location: [facility] CARDIAC CATH LAB    CV INTRAVASULAR ULTRASOUND N/A 3/9/2021    Procedure: Intravascular Ultrasound;  Surgeon: Dilip Zamora MD;  Location: [facility] CARDIAC CATH LAB    CV PCI STENT DRUG ELUTING N/A 3/9/2021    Procedure: Percutaneous Coronary Intervention Stent Drug Eluting;  Surgeon: Dilip Zamora MD;  Location: [facility] CARDIAC CATH LAB    HC CLOSED TX TRAUMATIC HIP DISLOC W/O ANESTH Right 2/15/2015    Procedure: HIP MANIPULATION / CLOSED REDUCTION;  Surgeon: Cliff Boss MD;  Location: [facility] OR;  Service: Orthopedics    JOINT REPLACEMENT      REMOVE HARDWARE ANKLE Right 3/8/2021    Procedure: RIGHT ANKLE STAPLE REMOVAL;  Surgeon: Venkata Mccallum MD;  Location:  OR    REPAIR LIGAMENT ANKLE  12/9/2013    Procedure: REPAIR LIGAMENT ANKLE;;  Surgeon: Venkata Mccallum MD;  Location: [facility]       FAMILY HISTORY:  Family History   Problem Relation Age of Onset    Heart Failure Mother     Coronary Stenting Mother     Heart Surgery Mother        SOCIAL HISTORY:  Social History     Socioeconomic History    Marital status:    Tobacco Use    Smoking status: Never    Smokeless tobacco: Never   Vaping Use    Vaping status: Never Used   Substance and Sexual Activity    Alcohol use: Yes     Comment: 1 can beer every 2 weeks    Drug use: No     Social Drivers of Health     Financial Resource Strain: Low Risk  (11/5/2024)    Financial Resource Strain     Within the past 12 months, have you or your family members you live with been unable to get utilities (heat, electricity) when it was really needed?: No   Food Insecurity: Low Risk  (11/5/2024)    Food Insecurity      Within the past 12 months, did you worry that your food would run out before you got money to buy more?: No     Within the past 12 months, did the food you bought just not last and you didn t have money to get more?: No   Transportation Needs: Low Risk  (11/5/2024)    Transportation Needs     Within the past 12 months, has lack of transportation kept you from medical appointments, getting your medicines, non-medical meetings or appointments, work, or from getting things that you need?: No   Interpersonal Safety: Low Risk  (11/4/2024)    Interpersonal Safety     Do you feel physically and emotionally safe where you currently live?: Yes     Within the past 12 months, have you been hit, slapped, kicked or otherwise physically hurt by someone?: No     Within the past 12 months, have you been humiliated or emotionally abused in other ways by your partner or ex-partner?: No   Housing Stability: Low Risk  (11/5/2024)    Housing Stability     Do you have housing? : Yes     Are you worried about losing your housing?: No       Review of Systems:  Skin:          Eyes:         ENT:         Respiratory:          Cardiovascular:         Gastroenterology:        Genitourinary:         Musculoskeletal:         Neurologic:         Psychiatric:         Heme/Lymph/Imm:         Endocrine:           Physical Exam:  Vitals: There were no vitals taken for this visit.    Constitutional:           Skin:             Head:           Eyes:           Lymph:      ENT:           Neck:           Respiratory:       Clear to auscultation     Cardiac:                Regular rate and rhythm                                           GI:           Extremities and Muscular Skeletal:                 Neurological:           Psych:           CC  No referring provider defined for this encounter.

## 2025-03-26 DIAGNOSIS — I25.110 CORONARY ARTERY DISEASE INVOLVING NATIVE CORONARY ARTERY OF NATIVE HEART WITH UNSTABLE ANGINA PECTORIS (H): ICD-10-CM

## 2025-03-26 DIAGNOSIS — I10 BENIGN ESSENTIAL HYPERTENSION: ICD-10-CM

## 2025-03-26 RX ORDER — SPIRONOLACTONE 25 MG/1
25 TABLET ORAL DAILY
Qty: 90 TABLET | Refills: 0 | Status: SHIPPED | OUTPATIENT
Start: 2025-03-26

## 2025-03-27 ENCOUNTER — OFFICE VISIT (OUTPATIENT)
Dept: CARDIOLOGY | Facility: CLINIC | Age: 70
End: 2025-03-27
Payer: COMMERCIAL

## 2025-03-27 VITALS
HEART RATE: 66 BPM | BODY MASS INDEX: 43.4 KG/M2 | OXYGEN SATURATION: 97 % | SYSTOLIC BLOOD PRESSURE: 118 MMHG | DIASTOLIC BLOOD PRESSURE: 80 MMHG | RESPIRATION RATE: 16 BRPM | WEIGHT: 315 LBS

## 2025-03-27 DIAGNOSIS — R06.02 SHORTNESS OF BREATH: Primary | ICD-10-CM

## 2025-03-27 LAB
CHOLEST SERPL-MCNC: 127 MG/DL
FASTING STATUS PATIENT QL REPORTED: NO
HDLC SERPL-MCNC: 46 MG/DL
LDLC SERPL CALC-MCNC: 42 MG/DL
NONHDLC SERPL-MCNC: 81 MG/DL
NT-PROBNP SERPL-MCNC: 45 PG/ML (ref 0–900)
TRIGL SERPL-MCNC: 193 MG/DL
TROPONIN T SERPL HS-MCNC: 13 NG/L

## 2025-03-27 NOTE — LETTER
3/27/2025    Dilip Ellis MD  [address] [facility] [address]    RE: aPras Ramos       Dear Colleague,     I had the pleasure of seeing Paras Ramos in the [facility].  HPI and Plan:       I had the pleasure of seeing Mr. Ramos in follow up at the [facility] today.  He is a very pleasant, 69-year-old gentleman whom I last saw in 01/2024     I originally saw him in early 2021 after he had undergone removal of a loose staple post right ankle repair and postoperatively started experiencing substernal chest discomfort.  At the time I saw him, an echocardiogram demonstrated severe left ventricular dysfunction with an ejection fraction of 25%-30%.  This appeared to be global in nature, but given these findings, I recommended a coronary angiography, which was performed on 03/09/2021.       He was found to have 2 vessel obstructive coronary artery disease with a diffuse mid to distal LAD stenosis that was revascularized with 2 drug-eluting stents.  He also was noted to have a focal 70% stenosis in the RPDA, which was thought to be appropriate for medical therapy initially.  He was also initiated on appropriate medical therapy for his cardiomyopathy.    Due to anginal symptoms he underwent drug-eluting stent placement to the PDA in January 2023.  Moderate nonobstructive disease was noted elsewhere.  An echocardiogram prior to his angiogram demonstrated normal left ventricular systolic function.    At his last office visit he was experiencing episodic dyspnea and his ticagrelor was discontinued.  He continues to experience exertional dyspnea, however, some of which may be due to deconditioning given that he underwent left foot surgery and carpal tunnel surgery earlier this year.    Interestingly, he has also developed potential carpal tunnel involving the right wrist after having undergone left carpal tunnel release earlier.  He is wondering if this may be a marker  of underlying cardiac amyloidosis.    He denies any chest discomfort, syncope or presyncope.  She denies any PND but does have chronic lower extremity edema.  He remains compliant with his medications.     PHYSICAL EXAMINATION:  Dictated below.    A lipid panel on 1/19/2024 demonstrated total cholesterol 117, triglycerides 176, HDL 39 and LDL of 43.    A1c on 10/21/2024 was 6.2.    An echocardiogram on 3/10/2025 demonstrated low normal left ventricular systolic function with no significant valvular disease.  Left ventricular wall thickness was within normal limits.  This was personally reviewed and independently interpreted in the presence of the patient.    We also reviewed his cardiovascular MRI from 2021 which demonstrated late enhancement involving the lateral wall consistent with a previous myocardial infarction but no diffuse uptake that would be suggestive of an infiltrative cardiac process.     IMPRESSION:      1.  Coronary artery disease, status post drug-eluting stent placement to the LAD as described above in 03/2021.    2.  Residual moderate to severe RPDA stenosis which was revascularized in January 2023.  Currently on aspirin 81 mg daily.  3.  Presumed ischemic cardiomyopathy with subsequent normalization of left ventricular systolic function.  4.  Diabetes mellitus.  5.  Hypertension.  6.  Obstructive sleep apnea.    PLAN    Mr. Ramos is doing well overall from a cardiac standpoint.  Given the recent development of bilateral carpal tunnel syndrome the question of an infiltrative cardiac process is a reasonable concern, particularly given his ongoing dyspnea on exertion.    I think that it would be reasonable to proceed with a Lexiscan stress cardiovascular MRI for further evaluation in this regard.  I will also order an N-terminal proBNP and troponin for further evaluation.  Depending on the results, PYP imaging may be considered.    It was a pleasure seeing him today.    The longitudinal plan of  care for the diagnosis(es)/condition(s) as documented were addressed during this visit. Due to the added complexity in care, I will continue to support Paras in the subsequent management and with ongoing continuity of care.      CURRENT MEDICATIONS:  Current Outpatient Medications   Medication Sig Dispense Refill     acetaminophen (TYLENOL) 325 MG tablet Take 2 tablets (650 mg) by mouth every 4 hours as needed for other (mild pain). 100 tablet 0     amLODIPine (NORVASC) 5 MG tablet Take 5 mg by mouth daily        aspirin 81 MG EC tablet Take 1 tablet (81 mg) by mouth 2 times daily. 60 tablet 0     atorvastatin (LIPITOR) 80 MG tablet Take 1 tablet (80 mg) by mouth At Bedtime 90 tablet 3     carvedilol (COREG) 12.5 MG tablet Take 1 tablet (12.5 mg) by mouth 2 times daily (with meals). 180 tablet 0     hydrOXYzine HCl (ATARAX) 10 MG tablet Take 1 tablet (10 mg) by mouth every 6 hours as needed for itching or anxiety (with pain, moderate pain). 30 tablet 0     isosorbide mononitrate (IMDUR) 30 MG 24 hr tablet Take 0.5 tablets (15 mg) by mouth daily. 45 tablet 1     losartan (COZAAR) 100 MG tablet Take 100 mg by mouth daily        metFORMIN (GLUCOPHAGE XR) 500 MG 24 hr tablet Take 500 mg by mouth daily (with dinner).       nitroGLYcerin (NITROSTAT) 0.4 MG sublingual tablet For chest pain place 1 tablet under the tongue every 5 minutes for 3 doses. If symptoms persist 5 minutes after 1st dose call 911. 15 tablet 0     ondansetron (ZOFRAN ODT) 4 MG ODT tab Take 1 tablet (4 mg) by mouth every 8 hours as needed for nausea. Dissolve ON the tongue. 10 tablet 0     oxyCODONE (ROXICODONE) 5 MG tablet Take 1-2 tablets (5-10 mg) by mouth every 4 hours as needed for moderate to severe pain. 26 tablet 0     senna-docusate (SENOKOT-S/PERICOLACE) 8.6-50 MG tablet Take 1-2 tablets by mouth 2 times daily. Take while on oral narcotics to prevent or treat constipation. 30 tablet 0     spironolactone (ALDACTONE) 25 MG tablet Take 1 tablet  (25 mg) by mouth daily 90 tablet 4       ALLERGIES   No Known Allergies    PAST MEDICAL HISTORY:  Past Medical History:   Diagnosis Date     Arthritis     OA of ankle and hip     Benign neoplasm of colon      CHF (congestive heart failure) (H)      Colon polyp      Coronary artery disease involving native coronary artery of native heart without angina pectoris 03/18/2021    coronary angiogram with DESx2 to LAD, residual RPDA disease 3/2021      Dyslipidemia 03/18/2021     ED (erectile dysfunction)      Gastro-oesophageal reflux disease      Hip dislocation, right (H)      History of colonic polyps      Hypertension      Ischemic cardiomyopathy 03/18/2021     Morbid obesity (H)      OA (osteoarthritis) of ankle      RAUL (obstructive sleep apnea)      Osteoarthritis of hip      Testicular hypofunction      Type 2 diabetes mellitus (H) 03/18/2021       PAST SURGICAL HISTORY:  Past Surgical History:   Procedure Laterality Date     ANKLE SURGERY Right      ARTHRODESIS ANKLE  12/9/2013    Procedure: ARTHRODESIS ANKLE;;  Surgeon: Venkata Mccallum MD;  [facility]     ARTHROPLASTY ANKLE  12/9/2013    Procedure: ARTHROPLASTY ANKLE;  RIGHT TOTAL ANKLE ARTHROPLASTY, SUBTALAR FUSION, LIGAMENT REPAIR ([employer]ier VALENCIA)^;  Surgeon: Venkata Mccallum MD;  [facility]     ARTHROPLASTY ANKLE Left 11/4/2024    Procedure: LEFT TOTAL ANKLE ARTHROPLASTY with achilles lengthening and brostrom;  Surgeon: Venkata Mccallum MD;  Location:  [facility]     ARTHROPLASTY HIP  7/23/2014    Procedure: ARTHROPLASTY HIP;  Surgeon: Cirilo Nassar MD;  Location:  OR     ARTHROPLASTY REVISION HIP Right 11/14/2014    Procedure: ARTHROPLASTY REVISION HIP;  Surgeon: Cirilo Nassar MD;  Location:  OR     ARTHROPLASTY REVISION HIP Right 11/9/2015    Procedure: ARTHROPLASTY REVISION HIP;  Surgeon: José Estrella MD;  Location:  OR     ARTHROPLASTY REVISION HIP Right 2/1/2017    Procedure: ARTHROPLASTY REVISION HIP;  Surgeon:  Cirilo Nassar MD;  Location:  OR     CHOLECYSTECTOMY       CHOLECYSTECTOMY  2000     COLONOSCOPY       CV CORONARY ANGIOGRAM N/A 3/9/2021    Procedure: Coronary Angiogram;  Surgeon: Dilip Zamora MD;  Location:  [facility]     CV CORONARY ANGIOGRAM N/A 1/27/2023    Procedure: Coronary Angiogram;  Surgeon: Zeyad Caldwell MD;  Location:  [facility]     CV INSTANTANEOUS WAVE-FREE RATIO N/A 3/9/2021    Procedure: Instantaneous Wave-Free Ratio;  Surgeon: Dilip Zamora MD;  Location:  [facility]     CV INTRAVASULAR ULTRASOUND N/A 3/9/2021    Procedure: Intravascular Ultrasound;  Surgeon: Dilip Zamora MD;  Location:  [facility]     CV PCI STENT DRUG ELUTING N/A 3/9/2021    Procedure: Percutaneous Coronary Intervention Stent Drug Eluting;  Surgeon: Dilip Zamora MD;  Location:  [facility]     HC CLOSED TX TRAUMATIC HIP DISLOC W/O ANESTH Right 2/15/2015    Procedure: HIP MANIPULATION / CLOSED REDUCTION;  Surgeon: Cliff Boss MD;  Location: [facility] OR;  Service: Orthopedics     JOINT REPLACEMENT       REMOVE HARDWARE ANKLE Right 3/8/2021    Procedure: RIGHT ANKLE STAPLE REMOVAL;  Surgeon: Venkata Mccallum MD;  Location: [facility]     REPAIR LIGAMENT ANKLE  12/9/2013    Procedure: REPAIR LIGAMENT ANKLE;;  Surgeon: Venkata Mccallum MD;  [facility]       FAMILY HISTORY:  Family History   Problem Relation Age of Onset     Heart Failure Mother      Coronary Stenting Mother      Heart Surgery Mother        SOCIAL HISTORY:  Social History     Socioeconomic History     Marital status:    Tobacco Use     Smoking status: Never     Smokeless tobacco: Never   Vaping Use     Vaping status: Never Used   Substance and Sexual Activity     Alcohol use: Yes     Comment: 1 can beer every 2 weeks     Drug use: No     Social Drivers of Health     Financial Resource Strain: Low Risk   (11/5/2024)    Financial Resource Strain      Within the past 12 months, have you or your family members you live with been unable to get utilities (heat, electricity) when it was really needed?: No   Food Insecurity: Low Risk  (11/5/2024)    Food Insecurity      Within the past 12 months, did you worry that your food would run out before you got money to buy more?: No      Within the past 12 months, did the food you bought just not last and you didn t have money to get more?: No   Transportation Needs: Low Risk  (11/5/2024)    Transportation Needs      Within the past 12 months, has lack of transportation kept you from medical appointments, getting your medicines, non-medical meetings or appointments, work, or from getting things that you need?: No   Interpersonal Safety: Low Risk  (11/4/2024)    Interpersonal Safety      Do you feel physically and emotionally safe where you currently live?: Yes      Within the past 12 months, have you been hit, slapped, kicked or otherwise physically hurt by someone?: No      Within the past 12 months, have you been humiliated or emotionally abused in other ways by your partner or ex-partner?: No   Housing Stability: Low Risk  (11/5/2024)    Housing Stability      Do you have housing? : Yes      Are you worried about losing your housing?: No       Review of Systems:  Skin:          Eyes:         ENT:         Respiratory:          Cardiovascular:         Gastroenterology:        Genitourinary:         Musculoskeletal:         Neurologic:         Psychiatric:         Heme/Lymph/Imm:         Endocrine:           Physical Exam:  Vitals: There were no vitals taken for this visit.    Constitutional:           Skin:             Head:           Eyes:           Lymph:      ENT:           Neck:           Respiratory:       Clear to auscultation     Cardiac:                Regular rate and rhythm                                           GI:           Extremities and Muscular Skeletal:                  Neurological:           Psych:           CC  No referring provider defined for this encounter.                  Thank you for allowing me to participate in the care of your patient.      Sincerely,     Shagufta Wu MD     [facility]  cc:   Shagufta Wu MD  [address] [address]

## 2025-04-14 ENCOUNTER — HOSPITAL ENCOUNTER (OUTPATIENT)
Dept: CARDIOLOGY | Facility: CLINIC | Age: 70
Discharge: HOME | End: 2025-04-14
Attending: INTERNAL MEDICINE | Admitting: INTERNAL MEDICINE
Payer: COMMERCIAL

## 2025-04-14 VITALS — DIASTOLIC BLOOD PRESSURE: 64 MMHG | HEART RATE: 64 BPM | SYSTOLIC BLOOD PRESSURE: 104 MMHG

## 2025-04-14 DIAGNOSIS — R06.02 SHORTNESS OF BREATH: ICD-10-CM

## 2025-04-14 PROCEDURE — 250N000011 HC RX IP 250 OP 636: Mod: JZ | Performed by: INTERNAL MEDICINE

## 2025-04-14 PROCEDURE — A9585 GADOBUTROL INJECTION: HCPCS | Performed by: INTERNAL MEDICINE

## 2025-04-14 PROCEDURE — 255N000002 HC RX 255 OP 636: Performed by: INTERNAL MEDICINE

## 2025-04-14 PROCEDURE — 75563 CARD MRI W/STRESS IMG & DYE: CPT

## 2025-04-14 RX ORDER — ALBUTEROL SULFATE 90 UG/1
2 INHALANT RESPIRATORY (INHALATION) EVERY 5 MIN PRN
Status: DISCONTINUED | OUTPATIENT
Start: 2025-04-14 | End: 2025-04-15 | Stop reason: HOSPADM

## 2025-04-14 RX ORDER — NITROGLYCERIN 0.4 MG/1
0.4 TABLET SUBLINGUAL EVERY 5 MIN PRN
Status: ACTIVE | OUTPATIENT
Start: 2025-04-14 | End: 2025-04-14

## 2025-04-14 RX ORDER — LIDOCAINE 40 MG/G
CREAM TOPICAL
Status: DISCONTINUED | OUTPATIENT
Start: 2025-04-14 | End: 2025-04-15 | Stop reason: HOSPADM

## 2025-04-14 RX ORDER — AMINOPHYLLINE 25 MG/ML
50-100 INJECTION, SOLUTION INTRAVENOUS
Status: DISCONTINUED | OUTPATIENT
Start: 2025-04-14 | End: 2025-04-15 | Stop reason: HOSPADM

## 2025-04-14 RX ORDER — GADOBUTROL 604.72 MG/ML
38 INJECTION INTRAVENOUS ONCE
Status: COMPLETED | OUTPATIENT
Start: 2025-04-14 | End: 2025-04-14

## 2025-04-14 RX ORDER — REGADENOSON 0.08 MG/ML
0.4 INJECTION, SOLUTION INTRAVENOUS ONCE
Status: COMPLETED | OUTPATIENT
Start: 2025-04-14 | End: 2025-04-14

## 2025-04-14 RX ORDER — DIAZEPAM 5 MG/1
5 TABLET ORAL EVERY 30 MIN PRN
Status: DISCONTINUED | OUTPATIENT
Start: 2025-04-14 | End: 2025-04-15 | Stop reason: HOSPADM

## 2025-04-14 RX ORDER — CAFFEINE 200 MG
200 TABLET ORAL
Status: ACTIVE | OUTPATIENT
Start: 2025-04-14 | End: 2025-04-14

## 2025-04-14 RX ORDER — LORAZEPAM 0.5 MG/1
0.5 TABLET ORAL EVERY 30 MIN PRN
Status: DISCONTINUED | OUTPATIENT
Start: 2025-04-14 | End: 2025-04-15 | Stop reason: HOSPADM

## 2025-04-14 RX ORDER — CAFFEINE CITRATE 20 MG/ML
60 SOLUTION INTRAVENOUS
Status: ACTIVE | OUTPATIENT
Start: 2025-04-14 | End: 2025-04-14

## 2025-04-14 RX ORDER — SODIUM CHLORIDE 9 MG/ML
INJECTION, SOLUTION INTRAVENOUS CONTINUOUS PRN
Status: ACTIVE | OUTPATIENT
Start: 2025-04-14 | End: 2025-04-14

## 2025-04-14 RX ADMIN — GADOBUTROL 38 ML: 604.72 INJECTION INTRAVENOUS at 09:02

## 2025-04-14 RX ADMIN — REGADENOSON 0.4 MG: 0.08 INJECTION, SOLUTION INTRAVENOUS at 08:26

## 2025-04-15 DIAGNOSIS — I25.110 CORONARY ARTERY DISEASE INVOLVING NATIVE CORONARY ARTERY OF NATIVE HEART WITH UNSTABLE ANGINA PECTORIS (H): Primary | ICD-10-CM

## 2025-04-15 DIAGNOSIS — I10 BENIGN ESSENTIAL HYPERTENSION: ICD-10-CM

## 2025-04-20 ENCOUNTER — HEALTH MAINTENANCE LETTER (OUTPATIENT)
Age: 70
End: 2025-04-20

## 2025-04-29 DIAGNOSIS — I25.110 CORONARY ARTERY DISEASE INVOLVING NATIVE CORONARY ARTERY OF NATIVE HEART WITH UNSTABLE ANGINA PECTORIS (H): ICD-10-CM

## 2025-04-29 RX ORDER — ISOSORBIDE MONONITRATE 30 MG/1
15 TABLET, EXTENDED RELEASE ORAL DAILY
Qty: 45 TABLET | Refills: 3 | Status: SHIPPED | OUTPATIENT
Start: 2025-04-29

## 2025-06-17 DIAGNOSIS — I25.110 CORONARY ARTERY DISEASE INVOLVING NATIVE CORONARY ARTERY OF NATIVE HEART WITH UNSTABLE ANGINA PECTORIS (H): ICD-10-CM

## 2025-06-17 DIAGNOSIS — I10 BENIGN ESSENTIAL HYPERTENSION: ICD-10-CM

## 2025-06-17 RX ORDER — CARVEDILOL 12.5 MG/1
12.5 TABLET ORAL 2 TIMES DAILY WITH MEALS
Qty: 180 TABLET | Refills: 2 | Status: SHIPPED | OUTPATIENT
Start: 2025-06-17

## 2025-06-22 ENCOUNTER — HEALTH MAINTENANCE LETTER (OUTPATIENT)
Age: 70
End: 2025-06-22

## 2025-06-23 DIAGNOSIS — I10 BENIGN ESSENTIAL HYPERTENSION: ICD-10-CM

## 2025-06-23 DIAGNOSIS — I25.110 CORONARY ARTERY DISEASE INVOLVING NATIVE CORONARY ARTERY OF NATIVE HEART WITH UNSTABLE ANGINA PECTORIS (H): ICD-10-CM

## 2025-06-23 RX ORDER — SPIRONOLACTONE 25 MG/1
25 TABLET ORAL DAILY
Qty: 90 TABLET | Refills: 3 | Status: SHIPPED | OUTPATIENT
Start: 2025-06-23

## 2025-08-11 ENCOUNTER — OFFICE VISIT (OUTPATIENT)
Dept: CARDIOLOGY | Facility: CLINIC | Age: 70
End: 2025-08-11
Attending: INTERNAL MEDICINE
Payer: COMMERCIAL

## 2025-08-11 VITALS
RESPIRATION RATE: 16 BRPM | SYSTOLIC BLOOD PRESSURE: 122 MMHG | HEIGHT: 74 IN | WEIGHT: 315 LBS | HEART RATE: 58 BPM | BODY MASS INDEX: 40.43 KG/M2 | DIASTOLIC BLOOD PRESSURE: 84 MMHG | OXYGEN SATURATION: 98 %

## 2025-08-11 DIAGNOSIS — R00.2 PALPITATIONS: ICD-10-CM

## 2025-08-11 DIAGNOSIS — I10 BENIGN ESSENTIAL HYPERTENSION: ICD-10-CM

## 2025-08-11 DIAGNOSIS — R06.09 DOE (DYSPNEA ON EXERTION): Primary | ICD-10-CM

## 2025-08-11 DIAGNOSIS — I25.110 CORONARY ARTERY DISEASE INVOLVING NATIVE CORONARY ARTERY OF NATIVE HEART WITH UNSTABLE ANGINA PECTORIS (H): ICD-10-CM

## 2025-08-11 DIAGNOSIS — E11.00 TYPE 2 DIABETES MELLITUS WITH HYPEROSMOLARITY WITHOUT COMA, WITHOUT LONG-TERM CURRENT USE OF INSULIN (H): ICD-10-CM

## 2025-08-11 DIAGNOSIS — E66.01 MORBID OBESITY DUE TO EXCESS CALORIES (H): ICD-10-CM

## 2025-09-04 LAB — CV ZIO PRELIM RESULTS: NORMAL

## (undated) DEVICE — IMM PILLOW ABDUCT HIP MED 31143061

## (undated) DEVICE — MANIFOLD KIT ANGIO AUTOMATED 014613

## (undated) DEVICE — CAST PADDING 4" UNSTERILE 9044

## (undated) DEVICE — GUIDEWIRE VASC 0.014INX180CM RUNTHROUGH 25-1011

## (undated) DEVICE — PACK TOTAL HIP W/U DRAPE SOP15HUFSC

## (undated) DEVICE — SLEEVE TR BAND RADIAL COMPRESSION DEVICE 29CM XX-RF06L

## (undated) DEVICE — PACK EXTREMITY SOP15EXFSD

## (undated) DEVICE — DRSG GAUZE 4X4" 3033

## (undated) DEVICE — TOTE ANGIO CORP PC15AT SAN32CC83O

## (undated) DEVICE — SU VICRYL 2-0 CT-2 27" UND J269H

## (undated) DEVICE — BLADE SAW LG BONE TORNIER 70X13X1.27MM SAW6944T

## (undated) DEVICE — INTRO SHEATH 4FRX10CM PINNACLE RSS402

## (undated) DEVICE — SOL WATER IRRIG 1000ML BOTTLE 2F7114

## (undated) DEVICE — SU ETHILON 3-0  FSLX 30" 1673H

## (undated) DEVICE — PREP SKIN SCRUB TRAY 4461A

## (undated) DEVICE — INTRODUCER SHEATH GREEN 6.5FRX11CM .038IN PSI-6F-11-038ACT

## (undated) DEVICE — MANIFOLD NEPTUNE 4 PORT 700-20

## (undated) DEVICE — STPL SKIN 35W 6.9MM  PXW35

## (undated) DEVICE — CATH DIAGNOSTIC RADIAL 5FR TIG 4.0

## (undated) DEVICE — DEFIB PRO-PADZ LVP LQD GEL ADULT 8900-2105-01

## (undated) DEVICE — CATH RX TAKERU PTCA BALLOON 2.00MM X 12MM

## (undated) DEVICE — LINEN TOWEL PACK X5 5464

## (undated) DEVICE — PREP DURAPREP 26ML APL 8630

## (undated) DEVICE — CATH ANGIO JUDKINS JL3.5 6FRX100CM INFINITI 534618T

## (undated) DEVICE — CAST PLASTER SPLINT 5X30" 7395

## (undated) DEVICE — CATH BALLOON NC EMERGE 2.75X20MM H7493926720270

## (undated) DEVICE — SET OF 12 PINS +1 REAMER FOR SALTO ANKLE LJV-529T

## (undated) DEVICE — VALVE HEMOSTASIS .096" COPILOT MECH 1003331

## (undated) DEVICE — IMM LIMB ELEVATOR DC40-0203

## (undated) DEVICE — ESU ELEC BLADE 6" COATED E1450-6

## (undated) DEVICE — DRAPE SHEET REV FOLD 3/4 9349

## (undated) DEVICE — CATH ANGIO JUDKINS JL4 6FRX100CM INFINITI 534620T

## (undated) DEVICE — HOOD FLYTE W/PEELAWAY 408-800-100

## (undated) DEVICE — SOL NACL 0.9% IRRIG 1000ML BOTTLE 2F7124

## (undated) DEVICE — KIT HAND CONTROL ANGIOTOUCH ACIST 65CM AT-P65

## (undated) DEVICE — PREP CHLORAPREP 26ML TINTED HI-LITE ORANGE 930815

## (undated) DEVICE — DRSG KERLIX 4 1/2"X4YDS ROLL 6715

## (undated) DEVICE — PAD ABD 10X8IN DERMACEA ABS NWVN 4 SL EDG SFT LF STRL 7198D

## (undated) DEVICE — ESU GROUND PAD UNIVERSAL W/O CORD

## (undated) DEVICE — BNDG ELASTIC 4"X5YDS UNSTERILE 6611-40

## (undated) DEVICE — GLOVE PROTEXIS W/NEU-THERA 8.5  2D73TE85

## (undated) DEVICE — CATH GUIDING BLUE YELLOW PTFE XB3.5 6FRX100CM 67005400

## (undated) DEVICE — DRAPE POUCH INSTRUMENT 1018

## (undated) DEVICE — BLADE SAW RECIP STRK 70X6X0.025MM 0277-096-250S5

## (undated) DEVICE — SU VICRYL 2-0 CP-1 27" UND J266H

## (undated) DEVICE — BNDG ELASTIC 4" DBL LENGTH UNSTERILE 6611-14

## (undated) DEVICE — SPONGE LAP 4X18" X8415

## (undated) DEVICE — SOL NACL 0.9% IRRIG 1000ML BOTTLE 07138-09

## (undated) DEVICE — DRAPE IOBAN INCISE 23X17" 6650EZ

## (undated) DEVICE — SPONGE LAP 18X18" X8435

## (undated) DEVICE — GLOVE PROTEXIS W/NEU-THERA 8.0  2D73TE80

## (undated) DEVICE — INTRO SHEATH 6FRX10CM PINNACLE RSS602

## (undated) DEVICE — CATH LAUNCHER 6FR JR 4.0 LA6JR40

## (undated) DEVICE — GUIDEWIRE VASC 0.035INX150CM INQWIRE J TIP IQ35F150J3F/A

## (undated) DEVICE — SU VICRYL 1 CT-1 27" UND J261H

## (undated) DEVICE — CATH BALLOON EMERGE 2.25X20MMH7493918920220

## (undated) DEVICE — CATH ANGIO INFINITI 3DRC 4FRX100CM 538476

## (undated) DEVICE — GLOVE PROTEXIS POWDER FREE 7.5 ORTHOPEDIC 2D73ET75

## (undated) DEVICE — CATH DIAG 4FR JL 4.5 538417

## (undated) DEVICE — MEDITRACE MULTIFUNTION ADULT RADIOTRANSPARENT ELECTRODE FOR ZOLL

## (undated) DEVICE — DRSG XEROFORM 5X9" 8884431605

## (undated) DEVICE — GLOVE PROTEXIS BLUE W/NEU-THERA 8.0  2D73EB80

## (undated) DEVICE — SU VICRYL 0 CP-1 27" J467H

## (undated) DEVICE — GLOVE BIOGEL PI MICRO SZ 8.0 48580

## (undated) DEVICE — NDL PERC ENTRY THINWALL 18GA 7.0" G00166

## (undated) DEVICE — INFL DVC BASIXCOMPAK PLYCRB 30 ATM 13IN 20ML IN4530

## (undated) DEVICE — DRSG ABDOMINAL 07 1/2X8" 7197D

## (undated) DEVICE — GLOVE PROTEXIS POWDER FREE 8.0 ORTHOPEDIC 2D73ET80

## (undated) DEVICE — GW MINAMO STRAIGHT 190CM

## (undated) DEVICE — INFL DVC KIT W/10CC NITRO IN4530

## (undated) DEVICE — BLADE SAW SAGITTAL STRK 25X79.5X1.24MM 4/2000 2108-318-000

## (undated) DEVICE — CAST PADDING 4" STERILE 9044S

## (undated) DEVICE — BLADE CLIPPER 4412A

## (undated) DEVICE — CATH US OD 5FR OD SEC 2.9FR EAGLE EYE PLATINUM 0.014 85900P

## (undated) DEVICE — DRAPE MINI C-ARM 54X64" 07-CA300

## (undated) DEVICE — SUCTION CANISTER MEDIVAC LINER 3000ML W/LID 65651-530

## (undated) DEVICE — SUCTION IRR SYSTEM W/O TIP INTERPULSE HANDPIECE 0210-100-000

## (undated) DEVICE — GW VASC OMNIWIRE J L185CM PRESSURE 89185J

## (undated) DEVICE — DEVICE FEMOSTOP COMPRESSION 11165

## (undated) DEVICE — DRILL BIT L135 MM OD3 MM LJV528T

## (undated) DEVICE — DRAPE COVER C-ARM SEAMLESS SNAP-KAP 03-KP26 LATEX FREE

## (undated) DEVICE — SU VICRYL 3-0 PS-1 18" UND J683

## (undated) DEVICE — CATH BALLOON EMERGE 2.5X12MM H7493918912250

## (undated) DEVICE — WIRE GUIDE 0.035"X260CM AMPTLAZ XSTIFF CVD THSCF-35-260-3-A

## (undated) DEVICE — GOWN XXLG REINFORCED 9071EL

## (undated) DEVICE — GW VASC 190CM .014IN HI-TRQ 1009660J

## (undated) DEVICE — GLOVE PROTEXIS W/NEU-THERA 7.5  2D73TE75

## (undated) DEVICE — SU ETHIBOND 0 CTX CR  8X18" CX31D

## (undated) DEVICE — INTRO GLIDESHEATH SLENDER 6FR 10X45CM 60-1060

## (undated) DEVICE — DRAIN ROUND W/RESERV KIT JACKSON PRATT 10FR 400ML SU130-402D

## (undated) DEVICE — GLOVE PROTEXIS MICRO 8.5  2D73PM85

## (undated) DEVICE — CATH BALLOON NC EMERGE 3.25X20MM H7493926720320

## (undated) DEVICE — WIRE GUIDE 0.035"X260CM SAFE-T-J EXCHANGE G00517

## (undated) RX ORDER — FENTANYL CITRATE 50 UG/ML
INJECTION, SOLUTION INTRAMUSCULAR; INTRAVENOUS
Status: DISPENSED
Start: 2017-02-01

## (undated) RX ORDER — REGADENOSON 0.08 MG/ML
INJECTION, SOLUTION INTRAVENOUS
Status: DISPENSED
Start: 2025-04-14

## (undated) RX ORDER — FENTANYL CITRATE 50 UG/ML
INJECTION, SOLUTION INTRAMUSCULAR; INTRAVENOUS
Status: DISPENSED
Start: 2021-03-09

## (undated) RX ORDER — FENTANYL CITRATE 0.05 MG/ML
INJECTION, SOLUTION INTRAMUSCULAR; INTRAVENOUS
Status: DISPENSED
Start: 2021-03-08

## (undated) RX ORDER — LIDOCAINE HYDROCHLORIDE 10 MG/ML
INJECTION, SOLUTION EPIDURAL; INFILTRATION; INTRACAUDAL; PERINEURAL
Status: DISPENSED
Start: 2023-01-27

## (undated) RX ORDER — LIDOCAINE HYDROCHLORIDE 20 MG/ML
INJECTION, SOLUTION EPIDURAL; INFILTRATION; INTRACAUDAL; PERINEURAL
Status: DISPENSED
Start: 2017-02-01

## (undated) RX ORDER — ACETAMINOPHEN 500 MG
TABLET ORAL
Status: DISPENSED
Start: 2017-02-01

## (undated) RX ORDER — ACETAMINOPHEN 325 MG/1
TABLET ORAL
Status: DISPENSED
Start: 2023-01-27

## (undated) RX ORDER — VERAPAMIL HYDROCHLORIDE 2.5 MG/ML
INJECTION, SOLUTION INTRAVENOUS
Status: DISPENSED
Start: 2021-03-09

## (undated) RX ORDER — HEPARIN SODIUM 1000 [USP'U]/ML
INJECTION, SOLUTION INTRAVENOUS; SUBCUTANEOUS
Status: DISPENSED
Start: 2021-03-09

## (undated) RX ORDER — ONDANSETRON 2 MG/ML
INJECTION INTRAMUSCULAR; INTRAVENOUS
Status: DISPENSED
Start: 2024-11-04

## (undated) RX ORDER — HEPARIN SODIUM 200 [USP'U]/100ML
INJECTION, SOLUTION INTRAVENOUS
Status: DISPENSED
Start: 2023-01-27

## (undated) RX ORDER — HEPARIN SODIUM 1000 [USP'U]/ML
INJECTION, SOLUTION INTRAVENOUS; SUBCUTANEOUS
Status: DISPENSED
Start: 2023-01-27

## (undated) RX ORDER — ONDANSETRON 2 MG/ML
INJECTION INTRAMUSCULAR; INTRAVENOUS
Status: DISPENSED
Start: 2021-03-08

## (undated) RX ORDER — ROPIVACAINE HYDROCHLORIDE 5 MG/ML
INJECTION, SOLUTION EPIDURAL; INFILTRATION; PERINEURAL
Status: DISPENSED
Start: 2021-03-08

## (undated) RX ORDER — ONDANSETRON 4 MG/1
TABLET, ORALLY DISINTEGRATING ORAL
Status: DISPENSED
Start: 2023-01-27

## (undated) RX ORDER — PROPOFOL 10 MG/ML
INJECTION, EMULSION INTRAVENOUS
Status: DISPENSED
Start: 2017-02-01

## (undated) RX ORDER — FENTANYL CITRATE 50 UG/ML
INJECTION, SOLUTION INTRAMUSCULAR; INTRAVENOUS
Status: DISPENSED
Start: 2024-11-04

## (undated) RX ORDER — FENTANYL CITRATE 50 UG/ML
INJECTION, SOLUTION INTRAMUSCULAR; INTRAVENOUS
Status: DISPENSED
Start: 2021-03-08

## (undated) RX ORDER — HEPARIN SODIUM 200 [USP'U]/100ML
INJECTION, SOLUTION INTRAVENOUS
Status: DISPENSED
Start: 2021-03-09

## (undated) RX ORDER — FENTANYL CITRATE 50 UG/ML
INJECTION, SOLUTION INTRAMUSCULAR; INTRAVENOUS
Status: DISPENSED
Start: 2023-01-27

## (undated) RX ORDER — HYDROMORPHONE HYDROCHLORIDE 1 MG/ML
INJECTION, SOLUTION INTRAMUSCULAR; INTRAVENOUS; SUBCUTANEOUS
Status: DISPENSED
Start: 2017-02-01

## (undated) RX ORDER — GLYCOPYRROLATE 0.2 MG/ML
INJECTION, SOLUTION INTRAMUSCULAR; INTRAVENOUS
Status: DISPENSED
Start: 2017-02-01

## (undated) RX ORDER — LIDOCAINE HYDROCHLORIDE 10 MG/ML
INJECTION, SOLUTION EPIDURAL; INFILTRATION; INTRACAUDAL; PERINEURAL
Status: DISPENSED
Start: 2021-03-09

## (undated) RX ORDER — PRASUGREL 10 MG/1
TABLET, FILM COATED ORAL
Status: DISPENSED
Start: 2021-03-09

## (undated) RX ORDER — OXYCODONE HYDROCHLORIDE 5 MG/1
TABLET ORAL
Status: DISPENSED
Start: 2023-01-27

## (undated) RX ORDER — ONDANSETRON 2 MG/ML
INJECTION INTRAMUSCULAR; INTRAVENOUS
Status: DISPENSED
Start: 2017-02-01

## (undated) RX ORDER — CEFAZOLIN SODIUM IN 0.9 % NACL 3 G/100 ML
INTRAVENOUS SOLUTION, PIGGYBACK (ML) INTRAVENOUS
Status: DISPENSED
Start: 2021-03-08

## (undated) RX ORDER — KETOROLAC TROMETHAMINE 30 MG/ML
INJECTION, SOLUTION INTRAMUSCULAR; INTRAVENOUS
Status: DISPENSED
Start: 2021-03-08

## (undated) RX ORDER — DIPHENHYDRAMINE HYDROCHLORIDE 50 MG/ML
INJECTION INTRAMUSCULAR; INTRAVENOUS
Status: DISPENSED
Start: 2017-02-01

## (undated) RX ORDER — NEOSTIGMINE METHYLSULFATE 1 MG/ML
VIAL (ML) INJECTION
Status: DISPENSED
Start: 2017-02-01

## (undated) RX ORDER — CEFAZOLIN SODIUM 1 G/50ML
SOLUTION INTRAVENOUS
Status: DISPENSED
Start: 2017-02-01

## (undated) RX ORDER — DEXAMETHASONE SODIUM PHOSPHATE 4 MG/ML
INJECTION, SOLUTION INTRA-ARTICULAR; INTRALESIONAL; INTRAMUSCULAR; INTRAVENOUS; SOFT TISSUE
Status: DISPENSED
Start: 2017-02-01

## (undated) RX ORDER — REGADENOSON 0.08 MG/ML
INJECTION, SOLUTION INTRAVENOUS
Status: DISPENSED
Start: 2021-06-02

## (undated) RX ORDER — DEXAMETHASONE SODIUM PHOSPHATE 4 MG/ML
INJECTION, SOLUTION INTRA-ARTICULAR; INTRALESIONAL; INTRAMUSCULAR; INTRAVENOUS; SOFT TISSUE
Status: DISPENSED
Start: 2024-11-04